# Patient Record
Sex: FEMALE | Race: WHITE | NOT HISPANIC OR LATINO | Employment: OTHER | URBAN - METROPOLITAN AREA
[De-identification: names, ages, dates, MRNs, and addresses within clinical notes are randomized per-mention and may not be internally consistent; named-entity substitution may affect disease eponyms.]

---

## 2018-01-24 ENCOUNTER — OFFICE VISIT (OUTPATIENT)
Dept: PAIN MEDICINE | Facility: CLINIC | Age: 63
End: 2018-01-24
Payer: COMMERCIAL

## 2018-01-24 VITALS
BODY MASS INDEX: 25.87 KG/M2 | SYSTOLIC BLOOD PRESSURE: 125 MMHG | DIASTOLIC BLOOD PRESSURE: 84 MMHG | HEART RATE: 86 BPM | HEIGHT: 63 IN | WEIGHT: 146 LBS

## 2018-01-24 DIAGNOSIS — M51.36 DDD (DEGENERATIVE DISC DISEASE), LUMBAR: Primary | ICD-10-CM

## 2018-01-24 DIAGNOSIS — M54.16 LUMBAR RADICULITIS: ICD-10-CM

## 2018-01-24 DIAGNOSIS — M48.061 SPINAL STENOSIS OF LUMBAR REGION WITHOUT NEUROGENIC CLAUDICATION: ICD-10-CM

## 2018-01-24 PROCEDURE — 99204 OFFICE O/P NEW MOD 45 MIN: CPT | Mod: 25,S$GLB,, | Performed by: ANESTHESIOLOGY

## 2018-01-24 PROCEDURE — 96372 THER/PROPH/DIAG INJ SC/IM: CPT | Mod: S$GLB,,, | Performed by: ANESTHESIOLOGY

## 2018-01-24 PROCEDURE — 99999 PR PBB SHADOW E&M-NEW PATIENT-LVL III: CPT | Mod: PBBFAC,,, | Performed by: ANESTHESIOLOGY

## 2018-01-24 RX ORDER — KETOROLAC TROMETHAMINE 30 MG/ML
30 INJECTION, SOLUTION INTRAMUSCULAR; INTRAVENOUS
Status: COMPLETED | OUTPATIENT
Start: 2018-01-24 | End: 2018-01-24

## 2018-01-24 RX ORDER — SODIUM, POTASSIUM,MAG SULFATES 17.5-3.13G
SOLUTION, RECONSTITUTED, ORAL ORAL
Status: ON HOLD | COMMUNITY
Start: 2017-11-02 | End: 2019-08-25 | Stop reason: CLARIF

## 2018-01-24 RX ORDER — GABAPENTIN 300 MG/1
300 CAPSULE ORAL 3 TIMES DAILY
Qty: 90 CAPSULE | Refills: 1 | Status: ON HOLD | OUTPATIENT
Start: 2018-01-24 | End: 2019-08-25 | Stop reason: CLARIF

## 2018-01-24 RX ORDER — LISINOPRIL 20 MG/1
20 TABLET ORAL NIGHTLY
Status: ON HOLD | COMMUNITY
Start: 2017-12-25 | End: 2019-08-25 | Stop reason: CLARIF

## 2018-01-24 RX ORDER — TRIAMTERENE AND HYDROCHLOROTHIAZIDE 37.5; 25 MG/1; MG/1
1 CAPSULE ORAL DAILY
COMMUNITY
Start: 2017-12-25

## 2018-01-24 RX ORDER — DIPHENHYDRAMINE HCL 25 MG
CAPSULE ORAL
COMMUNITY
End: 2019-09-18

## 2018-01-24 RX ADMIN — KETOROLAC TROMETHAMINE 30 MG: 30 INJECTION, SOLUTION INTRAMUSCULAR; INTRAVENOUS at 03:01

## 2018-01-25 ENCOUNTER — HOSPITAL ENCOUNTER (OUTPATIENT)
Dept: RADIOLOGY | Facility: HOSPITAL | Age: 63
Discharge: HOME | End: 2018-01-25
Attending: ANESTHESIOLOGY
Payer: COMMERCIAL

## 2018-01-25 ENCOUNTER — TELEPHONE (OUTPATIENT)
Dept: PAIN MEDICINE | Facility: CLINIC | Age: 63
End: 2018-01-25

## 2018-01-25 DIAGNOSIS — M54.16 LUMBAR RADICULOPATHY: Primary | ICD-10-CM

## 2018-01-25 DIAGNOSIS — M54.16 LUMBAR RADICULITIS: ICD-10-CM

## 2018-01-25 DIAGNOSIS — M51.36 DDD (DEGENERATIVE DISC DISEASE), LUMBAR: ICD-10-CM

## 2018-01-25 PROCEDURE — 72148 MRI LUMBAR SPINE W/O DYE: CPT | Mod: 26,,, | Performed by: RADIOLOGY

## 2018-01-25 PROCEDURE — 72148 MRI LUMBAR SPINE W/O DYE: CPT | Mod: TC

## 2018-01-25 NOTE — PROGRESS NOTES
This note was completed with dictation software and grammatical errors may exist.    Referring Physician: No ref. provider found    PCP: Jan Petersen MD    CC:  Low back and L leg pain    HPI:   Nancy Muñoz is a 62 y.o. female who has been living with low back pain for approximately 5 years since a fall. Was being seen by Dr. Alvarez in [place] for quite some time who has been treating her. She has previously undergone ~ 3 fusions in her neck. Recently she has noticed worsening of her back pain that is not alleviated by her normal measures, which include ibuprofen and heat backs. The pain has progressively worsened and began radiating down her L leg. She describes the pain as aching, burning, throbbing, grabbing, sharp, and shooting. Worsened by standing for long periods and walking.     ROS:  CONSTITUTIONAL: No fevers, chills, night sweats, wt. loss, appetite changes  SKIN: no rashes or itching  ENT: No headaches, head trauma, vision changes, or eye pain  LYMPH NODES: None noticed   CV: No chest pain, palpitations.   RESP: No shortness of breath, dyspnea on exertion, cough, wheezing, or hemoptysis  GI: No nausea, emesis, diarrhea, constipation, melena, hematochezia, pain.    : No dysuria, hematuria, urgency, or frequency   HEME: No easy bruising, bleeding problems  PSYCHIATRIC: No depression, anxiety, psychosis, hallucinations.  NEURO: No seizures, memory loss, dizziness, +ve difficulty sleeping  MSK: +ve joint pain      Past Medical History:   Diagnosis Date    Allergy     Arthritis     Asthma     Coronary artery disease     Hypertension     Hyperthyroidism      Past Surgical History:   Procedure Laterality Date    APPENDECTOMY      CHOLECYSTECTOMY      SPINE SURGERY      TONSILLECTOMY       No family history on file.  Social History     Social History    Marital status:      Spouse name: N/A    Number of children: N/A    Years of education: N/A     Social History Main Topics     "Smoking status: Current Every Day Smoker    Smokeless tobacco: Current User    Alcohol use 0.6 oz/week     1 Glasses of wine per week    Drug use: No    Sexual activity: Yes     Other Topics Concern    None     Social History Narrative    None         Medications/Allergies: See med card    Vitals:    01/24/18 1449   BP: 125/84   Pulse: 86   Weight: 66.2 kg (146 lb)   Height: 5' 3" (1.6 m)   PainSc: 10-Worst pain ever   PainLoc: Back         Physical exam:    GENERAL: A and O x3, the patient appears well groomed and is in no acute distress.  Skin: No rashes or obvious lesions  HEENT: normocephalic, atraumatic  CARDIOVASCULAR:  Palpable peripheral pulses  LUNGS: easy work of breathing  ABDOMEN: soft, nontender   UPPER EXTREMITIES: Normal alignment, normal range of motion, no atrophy, no skin changes,  hair growth and nail growth normal and equal bilaterally. No swelling, no tenderness.    LOWER EXTREMITIES:  Normal alignment, normal range of motion, no atrophy, no skin changes,  hair growth and nail growth normal and equal bilaterally. No swelling, no tenderness.    LUMBAR SPINE  Lumbar spine: ROM is full with flexion with no increased pain. Extension and oblique causes significant pain  Cedrick's test causes no increased pain on either side.    Supine straight leg raise is positive on L  Femoral stretch is positive on L  Internal and external rotation of the hip causes no increased pain on either side.  Myofascial exam: No tenderness to palpation across lumbar paraspinous muscles.    MENTAL STATUS: normal orientation, speech, language, and fund of knowledge for social situation.  Emotional state appropriate.    CRANIAL NERVES:  II:  PERRL bilaterally,   III,IV,VI: EOMI.    V:  Facial sensation equal bilaterally  VII:  Facial motor function normal.  VIII:  Hearing equal to finger rub bilaterally  IX/X: Gag normal, palate symmetric  XI:  Shoulder shrug equal, head turn equal  XII:  Tongue midline without " fasciculations      MOTOR: Tone and bulk: normal bilateral upper and lower Strength: normal   Delt Bi Tri WE WF     R 5 5 5 5 5 5   L 5 5 5 5 5 5     IP ADD ABD Quad TA Gas HAM  R 5 5 5 5 5 5 5  L 5 5 5 5 5 5 5    SENSATION: Light touch and pinprick intact bilaterally  REFLEXES: normal, symmetric, nonbrisk.  Toes down, no clonus. No hoffmans.  GAIT: antalgic, flexion during majority of gait      Imaging:  Pending MRI    Assessment:  61 yo female with an exacerbation of chronic low back pain  1. DDD (degenerative disc disease), lumbar    2. Lumbar radiculitis    3. Spinal stenosis of lumbar region without neurogenic claudication          Plan:  - I have stressed the importance of physical activity and exercise to improve overall health  - Schedule lumbar MRI to further evaluate worsening radicular pain  - Will contact patient with MRI results  - IM Toradol today  - Start gabapentin 300mg TID for anti-neuropathic adjunct

## 2018-01-29 RX ORDER — NAPROXEN SODIUM 220 MG
220 TABLET ORAL
COMMUNITY
End: 2018-02-19

## 2018-01-29 RX ORDER — IBUPROFEN 200 MG
200 TABLET ORAL EVERY 6 HOURS PRN
COMMUNITY
End: 2018-02-19

## 2018-01-30 ENCOUNTER — HOSPITAL ENCOUNTER (OUTPATIENT)
Facility: AMBULARY SURGERY CENTER | Age: 63
Discharge: HOME | End: 2018-01-30
Attending: ANESTHESIOLOGY | Admitting: ANESTHESIOLOGY
Payer: COMMERCIAL

## 2018-01-30 ENCOUNTER — SURGERY (OUTPATIENT)
Age: 63
End: 2018-01-30

## 2018-01-30 DIAGNOSIS — M54.16 LUMBAR RADICULITIS: Primary | ICD-10-CM

## 2018-01-30 PROCEDURE — 64484 NJX AA&/STRD TFRM EPI L/S EA: CPT | Mod: LT,,, | Performed by: ANESTHESIOLOGY

## 2018-01-30 PROCEDURE — 64483 NJX AA&/STRD TFRM EPI L/S 1: CPT | Mod: LT,,, | Performed by: ANESTHESIOLOGY

## 2018-01-30 PROCEDURE — 99152 MOD SED SAME PHYS/QHP 5/>YRS: CPT | Mod: ,,, | Performed by: ANESTHESIOLOGY

## 2018-01-30 PROCEDURE — 64484 NJX AA&/STRD TFRM EPI L/S EA: CPT | Performed by: ANESTHESIOLOGY

## 2018-01-30 PROCEDURE — 64483 NJX AA&/STRD TFRM EPI L/S 1: CPT | Performed by: ANESTHESIOLOGY

## 2018-01-30 RX ORDER — DEXAMETHASONE SODIUM PHOSPHATE 10 MG/ML
INJECTION INTRAMUSCULAR; INTRAVENOUS
Status: DISCONTINUED
Start: 2018-01-30 | End: 2018-01-30 | Stop reason: HOSPADM

## 2018-01-30 RX ORDER — LIDOCAINE HYDROCHLORIDE 10 MG/ML
INJECTION, SOLUTION EPIDURAL; INFILTRATION; INTRACAUDAL; PERINEURAL
Status: DISCONTINUED
Start: 2018-01-30 | End: 2018-01-30 | Stop reason: HOSPADM

## 2018-01-30 RX ORDER — ALBUTEROL SULFATE 90 UG/1
2 AEROSOL, METERED RESPIRATORY (INHALATION) EVERY 6 HOURS PRN
COMMUNITY

## 2018-01-30 RX ORDER — MIDAZOLAM HYDROCHLORIDE 1 MG/ML
INJECTION INTRAMUSCULAR; INTRAVENOUS
Status: DISCONTINUED
Start: 2018-01-30 | End: 2018-01-30 | Stop reason: HOSPADM

## 2018-01-30 RX ORDER — DEXAMETHASONE SODIUM PHOSPHATE 10 MG/ML
INJECTION INTRAMUSCULAR; INTRAVENOUS
Status: DISCONTINUED | OUTPATIENT
Start: 2018-01-30 | End: 2018-01-30 | Stop reason: HOSPADM

## 2018-01-30 RX ORDER — FENTANYL CITRATE 50 UG/ML
INJECTION, SOLUTION INTRAMUSCULAR; INTRAVENOUS
Status: DISCONTINUED | OUTPATIENT
Start: 2018-01-30 | End: 2018-01-30 | Stop reason: HOSPADM

## 2018-01-30 RX ORDER — FENTANYL CITRATE 50 UG/ML
INJECTION, SOLUTION INTRAMUSCULAR; INTRAVENOUS
Status: DISCONTINUED
Start: 2018-01-30 | End: 2018-01-30 | Stop reason: HOSPADM

## 2018-01-30 RX ORDER — BUPIVACAINE HYDROCHLORIDE 2.5 MG/ML
INJECTION, SOLUTION EPIDURAL; INFILTRATION; INTRACAUDAL
Status: DISCONTINUED | OUTPATIENT
Start: 2018-01-30 | End: 2018-01-30 | Stop reason: HOSPADM

## 2018-01-30 RX ORDER — SODIUM CHLORIDE, SODIUM LACTATE, POTASSIUM CHLORIDE, CALCIUM CHLORIDE 600; 310; 30; 20 MG/100ML; MG/100ML; MG/100ML; MG/100ML
INJECTION, SOLUTION INTRAVENOUS ONCE AS NEEDED
Status: COMPLETED | OUTPATIENT
Start: 2018-01-30 | End: 2018-01-30

## 2018-01-30 RX ORDER — MIDAZOLAM HYDROCHLORIDE 1 MG/ML
INJECTION INTRAMUSCULAR; INTRAVENOUS
Status: DISCONTINUED | OUTPATIENT
Start: 2018-01-30 | End: 2018-01-30 | Stop reason: HOSPADM

## 2018-01-30 RX ORDER — LIDOCAINE HYDROCHLORIDE 10 MG/ML
INJECTION, SOLUTION EPIDURAL; INFILTRATION; INTRACAUDAL; PERINEURAL
Status: DISCONTINUED | OUTPATIENT
Start: 2018-01-30 | End: 2018-01-30 | Stop reason: HOSPADM

## 2018-01-30 RX ADMIN — LIDOCAINE HYDROCHLORIDE 10 ML: 10 INJECTION, SOLUTION EPIDURAL; INFILTRATION; INTRACAUDAL; PERINEURAL at 02:01

## 2018-01-30 RX ADMIN — BUPIVACAINE HYDROCHLORIDE 3 ML: 2.5 INJECTION, SOLUTION EPIDURAL; INFILTRATION; INTRACAUDAL at 02:01

## 2018-01-30 RX ADMIN — FENTANYL CITRATE 50 MCG: 50 INJECTION, SOLUTION INTRAMUSCULAR; INTRAVENOUS at 02:01

## 2018-01-30 RX ADMIN — MIDAZOLAM HYDROCHLORIDE 2 MG: 1 INJECTION INTRAMUSCULAR; INTRAVENOUS at 02:01

## 2018-01-30 RX ADMIN — SODIUM CHLORIDE, SODIUM LACTATE, POTASSIUM CHLORIDE, CALCIUM CHLORIDE: 600; 310; 30; 20 INJECTION, SOLUTION INTRAVENOUS at 01:01

## 2018-01-30 RX ADMIN — DEXAMETHASONE SODIUM PHOSPHATE 10 MG: 10 INJECTION INTRAMUSCULAR; INTRAVENOUS at 02:01

## 2018-01-30 NOTE — DISCHARGE SUMMARY
[facility]  Discharge Note  Short Stay    Admit Date: 1/30/2018    Discharge Date and Time: 1/30/2018    Attending Physician: Oziel Bustos MD     Discharge Provider: Oziel Bustos    Diagnoses:  Active Hospital Problems    Diagnosis  POA    *Lumbar radiculitis [M54.16]  Yes      Resolved Hospital Problems    Diagnosis Date Resolved POA   No resolved problems to display.       Hospital Course: Lumbar OLGA  Discharged Condition: Good    Final Diagnoses:   Active Hospital Problems    Diagnosis  POA    *Lumbar radiculitis [M54.16]  Yes      Resolved Hospital Problems    Diagnosis Date Resolved POA   No resolved problems to display.       Disposition: Home or Self Care    Follow up/Patient Instructions:    Medications:  Reconciled Home Medications:   Current Discharge Medication List      CONTINUE these medications which have NOT CHANGED    Details   albuterol (PROAIR HFA) 90 mcg/actuation inhaler Inhale 2 puffs into the lungs every 6 (six) hours as needed for Wheezing. Rescue      ibuprofen (ADVIL,MOTRIN) 200 MG tablet Take 200 mg by mouth every 6 (six) hours as needed for Pain.      naproxen sodium (ANAPROX) 220 MG tablet Take 220 mg by mouth every 12 (twelve) hours.      triamterene-hydrochlorothiazide 37.5-25 mg (DYAZIDE) 37.5-25 mg per capsule       diphenhydrAMINE (BENADRYL) 25 mg capsule Take by mouth.      gabapentin (NEURONTIN) 300 MG capsule Take 1 capsule (300 mg total) by mouth 3 (three) times daily.  Qty: 90 capsule, Refills: 1    Associated Diagnoses: DDD (degenerative disc disease), lumbar; Lumbar radiculitis      lisinopril (PRINIVIL,ZESTRIL) 20 MG tablet       SUPREP BOWEL PREP KIT 17.5-3.13-1.6 gram SolR              Discharge Procedure Orders  Call MD for:  temperature >100.4     Call MD for:  persistent nausea and vomiting or diarrhea     Call MD for:  severe uncontrolled pain     Call MD for:  redness, tenderness, or signs of infection (pain, swelling, redness, odor or green/yellow discharge  around incision site)     Call MD for:  difficulty breathing or increased cough     Call MD for:  severe persistent headache          Follow up with MD in 2-3 weeks    Discharge Procedure Orders (must include Diet, Follow-up, Activity):    Discharge Procedure Orders (must include Diet, Follow-up, Activity)  Call MD for:  temperature >100.4     Call MD for:  persistent nausea and vomiting or diarrhea     Call MD for:  severe uncontrolled pain     Call MD for:  redness, tenderness, or signs of infection (pain, swelling, redness, odor or green/yellow discharge around incision site)     Call MD for:  difficulty breathing or increased cough     Call MD for:  severe persistent headache

## 2018-01-30 NOTE — H&P (VIEW-ONLY)
This note was completed with dictation software and grammatical errors may exist.    Referring Physician: No ref. provider found    PCP: Jan Petersen MD    CC:  Low back and L leg pain    HPI:   Nnacy Muñoz is a 62 y.o. female who has been living with low back pain for approximately 5 years since a fall. Was being seen by Dr. Alvarez in [place] for quite some time who has been treating her. She has previously undergone ~ 3 fusions in her neck. Recently she has noticed worsening of her back pain that is not alleviated by her normal measures, which include ibuprofen and heat backs. The pain has progressively worsened and began radiating down her L leg. She describes the pain as aching, burning, throbbing, grabbing, sharp, and shooting. Worsened by standing for long periods and walking.     ROS:  CONSTITUTIONAL: No fevers, chills, night sweats, wt. loss, appetite changes  SKIN: no rashes or itching  ENT: No headaches, head trauma, vision changes, or eye pain  LYMPH NODES: None noticed   CV: No chest pain, palpitations.   RESP: No shortness of breath, dyspnea on exertion, cough, wheezing, or hemoptysis  GI: No nausea, emesis, diarrhea, constipation, melena, hematochezia, pain.    : No dysuria, hematuria, urgency, or frequency   HEME: No easy bruising, bleeding problems  PSYCHIATRIC: No depression, anxiety, psychosis, hallucinations.  NEURO: No seizures, memory loss, dizziness, +ve difficulty sleeping  MSK: +ve joint pain      Past Medical History:   Diagnosis Date    Allergy     Arthritis     Asthma     Coronary artery disease     Hypertension     Hyperthyroidism      Past Surgical History:   Procedure Laterality Date    APPENDECTOMY      CHOLECYSTECTOMY      SPINE SURGERY      TONSILLECTOMY       No family history on file.  Social History     Social History    Marital status:      Spouse name: N/A    Number of children: N/A    Years of education: N/A     Social History Main Topics     "Smoking status: Current Every Day Smoker    Smokeless tobacco: Current User    Alcohol use 0.6 oz/week     1 Glasses of wine per week    Drug use: No    Sexual activity: Yes     Other Topics Concern    None     Social History Narrative    None         Medications/Allergies: See med card    Vitals:    01/24/18 1449   BP: 125/84   Pulse: 86   Weight: 66.2 kg (146 lb)   Height: 5' 3" (1.6 m)   PainSc: 10-Worst pain ever   PainLoc: Back         Physical exam:    GENERAL: A and O x3, the patient appears well groomed and is in no acute distress.  Skin: No rashes or obvious lesions  HEENT: normocephalic, atraumatic  CARDIOVASCULAR:  Palpable peripheral pulses  LUNGS: easy work of breathing  ABDOMEN: soft, nontender   UPPER EXTREMITIES: Normal alignment, normal range of motion, no atrophy, no skin changes,  hair growth and nail growth normal and equal bilaterally. No swelling, no tenderness.    LOWER EXTREMITIES:  Normal alignment, normal range of motion, no atrophy, no skin changes,  hair growth and nail growth normal and equal bilaterally. No swelling, no tenderness.    LUMBAR SPINE  Lumbar spine: ROM is full with flexion with no increased pain. Extension and oblique causes significant pain  Cedrick's test causes no increased pain on either side.    Supine straight leg raise is positive on L  Femoral stretch is positive on L  Internal and external rotation of the hip causes no increased pain on either side.  Myofascial exam: No tenderness to palpation across lumbar paraspinous muscles.    MENTAL STATUS: normal orientation, speech, language, and fund of knowledge for social situation.  Emotional state appropriate.    CRANIAL NERVES:  II:  PERRL bilaterally,   III,IV,VI: EOMI.    V:  Facial sensation equal bilaterally  VII:  Facial motor function normal.  VIII:  Hearing equal to finger rub bilaterally  IX/X: Gag normal, palate symmetric  XI:  Shoulder shrug equal, head turn equal  XII:  Tongue midline without " fasciculations      MOTOR: Tone and bulk: normal bilateral upper and lower Strength: normal   Delt Bi Tri WE WF     R 5 5 5 5 5 5   L 5 5 5 5 5 5     IP ADD ABD Quad TA Gas HAM  R 5 5 5 5 5 5 5  L 5 5 5 5 5 5 5    SENSATION: Light touch and pinprick intact bilaterally  REFLEXES: normal, symmetric, nonbrisk.  Toes down, no clonus. No hoffmans.  GAIT: antalgic, flexion during majority of gait      Imaging:  Pending MRI    Assessment:  63 yo female with an exacerbation of chronic low back pain  1. DDD (degenerative disc disease), lumbar    2. Lumbar radiculitis    3. Spinal stenosis of lumbar region without neurogenic claudication          Plan:  - I have stressed the importance of physical activity and exercise to improve overall health  - Schedule lumbar MRI to further evaluate worsening radicular pain  - Will contact patient with MRI results  - IM Toradol today  - Start gabapentin 300mg TID for anti-neuropathic adjunct

## 2018-01-30 NOTE — OP NOTE
PROCEDURE DATE: 1/30/2018    PROCEDURE: Left L4-5 and L5-S1 transforaminal epidural steroid injection under fluoroscopy    DIAGNOSIS: Lumbar disc displacement without myelopathy  Post op diagnosis: Same    PHYSICIAN: Oziel Bustos MD    MEDICATIONS INJECTED:  Dexamethasone 5mg (0.5ml) and 1.5ml 0.25% bupivicaine at each nerve root.     LOCAL ANESTHETIC INJECTED:  Lidocaine 1%. 2 ml per site.    SEDATION MEDICATIONS: RN IV sedation    ESTIMATED BLOOD LOSS:  None    COMPLICATIONS:  None    TECHNIQUE:   A time-out was taken to identify patient and procedure side prior to starting the procedure. The patient was placed in a prone position, prepped and draped in the usual sterile fashion using ChloraPrep and sterile towels.  The area to be injected was determined under fluoroscopic guidance in AP and oblique view.  Local anesthetic was given by raising a wheal and going down to the hub of a 25-gauge 1.5 inch needle.  In oblique view, a 3.5 inch 22-gauge bent-tip spinal needle was introduced towards 6 oclock position of the pedicle of each above named nerve root level.  The needle was walked medially then hinged into the neural foramen and position was confirmed in AP and lateral views.  1ml contrast dye was injected to confirm appropriate placement and that there was no vascular uptake.  After negative aspiration for blood or CSF, the medication was then injected. This was performed at the left L4-5 and L5-S1 level(s). The patient tolerated the procedure well.    The patient was monitored after the procedure.  Patient was given post procedure and discharge instructions to follow at home. The patient was discharged in a stable condition.

## 2018-01-30 NOTE — INTERVAL H&P NOTE
The patient has been examined and the H&P has been reviewed:    I concur with the findings and no changes have occurred since H&P was written.  This patient has been cleared for surgery in an ambulatory surgical facility    ASA 3,  MP3  No history of anesthetic complications  Plan for RN IV sedation      Anesthesia/Surgery risks, benefits and alternative options discussed and understood by patient/family.          There are no hospital problems to display for this patient.

## 2018-01-30 NOTE — DISCHARGE INSTRUCTIONS
Pain injection instructions:     Steroids take about a week to relieve pain.  Initially you may get pain relief from the local anesthetic but this may wear off before the steroid works.    No driving for 24 hrs   Activity as tolerated- gradually increase activities.  Dont lift over 10 lbs for 24 hrs   No heat at injection sites x 2 days  Use ice for mild swelling and for comfort.  May shower today. No baths for two days.      Resume Aspirin, Plavix, or Coumadin the day after the procedure unless otherwise instructed.   If diabetic,monitor your glucose carefully as steroids can increase glucose level    Seek immediate medical help for:   Severe increase in your usual pain or appearance of new pain.  Prolonged or increasing weakness or numbness in the legs or arms.    - Numbing medicine was injected that affects nerves that carry information from       muscles to brain and vice versa.  This numbness can last 4-6 hrs so be very careful walking.    Fever above 101 ,Drainage,redness,active bleeding, or increased swelling at the injection site.  Headache, shortness of breath, chest pain, or breathing problems.       Anesthesia information    Anesthesia Safety      You have been given medicine  to sedate you during your procedure today. This may have included both a pain medicine and sleeping medicine. Most of the effects have worn off; however, you may continue to have some drowsiness for the next  24 hours. Anesthesia and pain medicines can cause nausea, sleepiness, dizziness and  constipation.    HOME CARE:  1) For the next EIGHT HOURS, you should be watched by a responsible adult to look for any worsening of your condition.  2) DO NOT DRINK any ALCOHOL for the next 24 HOURS.  3) DO NOT DRIVE or operate dangerous machinery during the next 24 HOURS.  FOLLOW UP with your doctor or this facility if you are not alert and back to your usual level of activity within 24 hrs.  GET PROMPT MEDICAL ATTENTION if any of the  following occur:  -- Increased drowsiness  -- Increased weakness or dizziness  -- Repeated vomiting  -- If you cannot be awakened

## 2018-01-31 VITALS
WEIGHT: 146 LBS | OXYGEN SATURATION: 96 % | HEART RATE: 85 BPM | HEIGHT: 63 IN | BODY MASS INDEX: 25.87 KG/M2 | SYSTOLIC BLOOD PRESSURE: 130 MMHG | DIASTOLIC BLOOD PRESSURE: 83 MMHG | RESPIRATION RATE: 17 BRPM | TEMPERATURE: 98 F

## 2018-02-05 ENCOUNTER — TELEPHONE (OUTPATIENT)
Dept: PAIN MEDICINE | Facility: CLINIC | Age: 63
End: 2018-02-05

## 2018-02-05 RX ORDER — HYDROCODONE BITARTRATE AND ACETAMINOPHEN 7.5; 325 MG/1; MG/1
1 TABLET ORAL EVERY 8 HOURS PRN
Qty: 45 TABLET | Refills: 0 | Status: ON HOLD | OUTPATIENT
Start: 2018-02-05 | End: 2018-02-23

## 2018-02-05 NOTE — TELEPHONE ENCOUNTER
Pt called in c/o lower back pain. Rates it a 10/10. She had a lumbar OLGA on 1/30/18. She had minimal relief from procedure after one day. She is asking for other options. She is currently taking Aleve for pain relief.

## 2018-02-23 ENCOUNTER — HOSPITAL ENCOUNTER (INPATIENT)
Facility: HOSPITAL | Age: 63
LOS: 2 days | Discharge: HOME | DRG: 357 | End: 2018-02-28
Attending: INTERNAL MEDICINE | Admitting: INTERNAL MEDICINE
Payer: COMMERCIAL

## 2018-02-23 DIAGNOSIS — I10 HYPERTENSION, UNSPECIFIED TYPE: ICD-10-CM

## 2018-02-23 DIAGNOSIS — E83.52 HYPERCALCEMIA: ICD-10-CM

## 2018-02-23 DIAGNOSIS — K81.9 CHOLECYSTITIS: ICD-10-CM

## 2018-02-23 DIAGNOSIS — M54.16 LUMBAR RADICULITIS: ICD-10-CM

## 2018-02-23 DIAGNOSIS — K83.8 DILATED CBD, ACQUIRED: Primary | ICD-10-CM

## 2018-02-23 DIAGNOSIS — R10.30 LOWER ABDOMINAL PAIN: ICD-10-CM

## 2018-02-23 DIAGNOSIS — K57.92 DIVERTICULITIS: ICD-10-CM

## 2018-02-23 DIAGNOSIS — R93.5 ABNORMAL ABDOMINAL CT SCAN: ICD-10-CM

## 2018-02-23 LAB
AMYLASE SERPL-CCNC: 54 U/L
ANION GAP SERPL CALC-SCNC: 8 MMOL/L
APTT BLDCRRT: 23.8 SEC
BACTERIA #/AREA URNS HPF: ABNORMAL /HPF
BASOPHILS # BLD AUTO: 0 K/UL
BASOPHILS NFR BLD: 0 %
BILIRUB UR QL STRIP: NEGATIVE
BUN SERPL-MCNC: 49 MG/DL
CALCIUM SERPL-MCNC: 11.4 MG/DL
CHLORIDE SERPL-SCNC: 102 MMOL/L
CLARITY UR: CLEAR
CO2 SERPL-SCNC: 27 MMOL/L
COLOR UR: YELLOW
CREAT SERPL-MCNC: 1.2 MG/DL
DIFFERENTIAL METHOD: ABNORMAL
EOSINOPHIL # BLD AUTO: 0 K/UL
EOSINOPHIL NFR BLD: 0.2 %
ERYTHROCYTE [DISTWIDTH] IN BLOOD BY AUTOMATED COUNT: 12.7 %
EST. GFR  (AFRICAN AMERICAN): 56 ML/MIN/1.73 M^2
EST. GFR  (NON AFRICAN AMERICAN): 48 ML/MIN/1.73 M^2
ESTIMATED AVG GLUCOSE: 103 MG/DL
GLUCOSE SERPL-MCNC: 107 MG/DL
GLUCOSE UR QL STRIP: NEGATIVE
HBA1C MFR BLD HPLC: 5.2 %
HCT VFR BLD AUTO: 39.9 %
HGB BLD-MCNC: 13.4 G/DL
HGB UR QL STRIP: NEGATIVE
INR PPP: 1
KETONES UR QL STRIP: NEGATIVE
LEUKOCYTE ESTERASE UR QL STRIP: ABNORMAL
LIPASE SERPL-CCNC: 38 U/L
LYMPHOCYTES # BLD AUTO: 1.3 K/UL
LYMPHOCYTES NFR BLD: 12.1 %
MCH RBC QN AUTO: 31.3 PG
MCHC RBC AUTO-ENTMCNC: 33.5 G/DL
MCV RBC AUTO: 94 FL
MICROSCOPIC COMMENT: ABNORMAL
MONOCYTES # BLD AUTO: 1.1 K/UL
MONOCYTES NFR BLD: 9.9 %
NEUTROPHILS # BLD AUTO: 8.7 K/UL
NEUTROPHILS NFR BLD: 77.8 %
NITRITE UR QL STRIP: NEGATIVE
PH UR STRIP: 7 [PH] (ref 5–8)
PHOSPHATE SERPL-MCNC: 2.5 MG/DL
PLATELET # BLD AUTO: 257 K/UL
PMV BLD AUTO: 7.8 FL
POTASSIUM SERPL-SCNC: 3.9 MMOL/L
PROT UR QL STRIP: NEGATIVE
PROTHROMBIN TIME: 10.5 SEC
PTH-INTACT SERPL-MCNC: 193.6 PG/ML
RBC # BLD AUTO: 4.26 M/UL
RBC #/AREA URNS HPF: 2 /HPF (ref 0–4)
SODIUM SERPL-SCNC: 137 MMOL/L
SP GR UR STRIP: 1.01 (ref 1–1.03)
SQUAMOUS #/AREA URNS HPF: 4 /HPF
URATE CRY URNS QL MICRO: ABNORMAL
URN SPEC COLLECT METH UR: ABNORMAL
UROBILINOGEN UR STRIP-ACNC: NEGATIVE EU/DL
WBC # BLD AUTO: 11.2 K/UL
WBC #/AREA URNS HPF: 38 /HPF (ref 0–5)

## 2018-02-23 PROCEDURE — 94760 N-INVAS EAR/PLS OXIMETRY 1: CPT

## 2018-02-23 PROCEDURE — S0030 INJECTION, METRONIDAZOLE: HCPCS | Performed by: INTERNAL MEDICINE

## 2018-02-23 PROCEDURE — 63600175 PHARM REV CODE 636 W HCPCS: Performed by: INTERNAL MEDICINE

## 2018-02-23 PROCEDURE — 99219 PR INITIAL OBSERVATION CARE,LEVL II: CPT | Mod: ,,, | Performed by: INTERNAL MEDICINE

## 2018-02-23 PROCEDURE — G0379 DIRECT REFER HOSPITAL OBSERV: HCPCS

## 2018-02-23 PROCEDURE — 85025 COMPLETE CBC W/AUTO DIFF WBC: CPT

## 2018-02-23 PROCEDURE — 87427 SHIGA-LIKE TOXIN AG IA: CPT | Mod: 59

## 2018-02-23 PROCEDURE — 82652 VIT D 1 25-DIHYDROXY: CPT

## 2018-02-23 PROCEDURE — 85730 THROMBOPLASTIN TIME PARTIAL: CPT

## 2018-02-23 PROCEDURE — 87046 STOOL CULTR AEROBIC BACT EA: CPT | Mod: 59

## 2018-02-23 PROCEDURE — 25000003 PHARM REV CODE 250: Performed by: INTERNAL MEDICINE

## 2018-02-23 PROCEDURE — 83970 ASSAY OF PARATHORMONE: CPT

## 2018-02-23 PROCEDURE — 99900035 HC TECH TIME PER 15 MIN (STAT)

## 2018-02-23 PROCEDURE — 83690 ASSAY OF LIPASE: CPT

## 2018-02-23 PROCEDURE — 87086 URINE CULTURE/COLONY COUNT: CPT

## 2018-02-23 PROCEDURE — 82150 ASSAY OF AMYLASE: CPT

## 2018-02-23 PROCEDURE — 87045 FECES CULTURE AEROBIC BACT: CPT

## 2018-02-23 PROCEDURE — 93005 ELECTROCARDIOGRAM TRACING: CPT

## 2018-02-23 PROCEDURE — 25000003 PHARM REV CODE 250: Performed by: NURSE PRACTITIONER

## 2018-02-23 PROCEDURE — 80048 BASIC METABOLIC PNL TOTAL CA: CPT

## 2018-02-23 PROCEDURE — 25500020 PHARM REV CODE 255

## 2018-02-23 PROCEDURE — 87449 NOS EACH ORGANISM AG IA: CPT

## 2018-02-23 PROCEDURE — 36415 COLL VENOUS BLD VENIPUNCTURE: CPT

## 2018-02-23 PROCEDURE — 83036 HEMOGLOBIN GLYCOSYLATED A1C: CPT

## 2018-02-23 PROCEDURE — 85610 PROTHROMBIN TIME: CPT

## 2018-02-23 PROCEDURE — 84100 ASSAY OF PHOSPHORUS: CPT

## 2018-02-23 PROCEDURE — 81000 URINALYSIS NONAUTO W/SCOPE: CPT

## 2018-02-23 PROCEDURE — G0378 HOSPITAL OBSERVATION PER HR: HCPCS

## 2018-02-23 RX ORDER — ACETAMINOPHEN 325 MG/1
650 TABLET ORAL EVERY 4 HOURS PRN
Status: DISCONTINUED | OUTPATIENT
Start: 2018-02-23 | End: 2018-02-28 | Stop reason: HOSPADM

## 2018-02-23 RX ORDER — ALBUTEROL SULFATE 2.5 MG/.5ML
2.5 SOLUTION RESPIRATORY (INHALATION) EVERY 6 HOURS PRN
Status: DISCONTINUED | OUTPATIENT
Start: 2018-02-23 | End: 2018-02-28 | Stop reason: HOSPADM

## 2018-02-23 RX ORDER — ACETAMINOPHEN 325 MG/1
650 TABLET ORAL EVERY 6 HOURS PRN
Status: DISCONTINUED | OUTPATIENT
Start: 2018-02-23 | End: 2018-02-28 | Stop reason: HOSPADM

## 2018-02-23 RX ORDER — IBUPROFEN 200 MG
16 TABLET ORAL
Status: DISCONTINUED | OUTPATIENT
Start: 2018-02-23 | End: 2018-02-28 | Stop reason: HOSPADM

## 2018-02-23 RX ORDER — OXYCODONE AND ACETAMINOPHEN 10; 325 MG/1; MG/1
1 TABLET ORAL EVERY 8 HOURS PRN
COMMUNITY
End: 2019-09-18

## 2018-02-23 RX ORDER — GLUCAGON 1 MG
1 KIT INJECTION
Status: DISCONTINUED | OUTPATIENT
Start: 2018-02-23 | End: 2018-02-28 | Stop reason: HOSPADM

## 2018-02-23 RX ORDER — METHYLPREDNISOLONE 4 MG/1
4 TABLET ORAL
Status: ON HOLD | COMMUNITY
End: 2019-08-25 | Stop reason: CLARIF

## 2018-02-23 RX ORDER — LISINOPRIL 10 MG/1
20 TABLET ORAL NIGHTLY
Status: DISCONTINUED | OUTPATIENT
Start: 2018-02-23 | End: 2018-02-24

## 2018-02-23 RX ORDER — RAMELTEON 8 MG/1
8 TABLET ORAL NIGHTLY PRN
Status: DISCONTINUED | OUTPATIENT
Start: 2018-02-23 | End: 2018-02-28 | Stop reason: HOSPADM

## 2018-02-23 RX ORDER — ALBUTEROL SULFATE 90 UG/1
2 AEROSOL, METERED RESPIRATORY (INHALATION) EVERY 6 HOURS PRN
Status: DISCONTINUED | OUTPATIENT
Start: 2018-02-23 | End: 2018-02-23

## 2018-02-23 RX ORDER — SODIUM CHLORIDE 9 MG/ML
INJECTION, SOLUTION INTRAVENOUS
Status: DISPENSED
Start: 2018-02-23 | End: 2018-02-24

## 2018-02-23 RX ORDER — IBUPROFEN 200 MG
24 TABLET ORAL
Status: DISCONTINUED | OUTPATIENT
Start: 2018-02-23 | End: 2018-02-28 | Stop reason: HOSPADM

## 2018-02-23 RX ORDER — OXYCODONE AND ACETAMINOPHEN 10; 325 MG/1; MG/1
1 TABLET ORAL EVERY 8 HOURS PRN
Status: DISCONTINUED | OUTPATIENT
Start: 2018-02-23 | End: 2018-02-24

## 2018-02-23 RX ORDER — METRONIDAZOLE 500 MG/100ML
500 INJECTION, SOLUTION INTRAVENOUS
Status: DISCONTINUED | OUTPATIENT
Start: 2018-02-23 | End: 2018-02-28 | Stop reason: HOSPADM

## 2018-02-23 RX ORDER — ONDANSETRON 2 MG/ML
4 INJECTION INTRAMUSCULAR; INTRAVENOUS EVERY 6 HOURS PRN
Status: DISCONTINUED | OUTPATIENT
Start: 2018-02-23 | End: 2018-02-28 | Stop reason: HOSPADM

## 2018-02-23 RX ORDER — GABAPENTIN 300 MG/1
300 CAPSULE ORAL 3 TIMES DAILY
Status: DISCONTINUED | OUTPATIENT
Start: 2018-02-23 | End: 2018-02-28 | Stop reason: HOSPADM

## 2018-02-23 RX ORDER — ENOXAPARIN SODIUM 100 MG/ML
40 INJECTION SUBCUTANEOUS EVERY 24 HOURS
Status: DISCONTINUED | OUTPATIENT
Start: 2018-02-23 | End: 2018-02-25

## 2018-02-23 RX ORDER — PANTOPRAZOLE SODIUM 40 MG/1
40 TABLET, DELAYED RELEASE ORAL DAILY
Status: DISCONTINUED | OUTPATIENT
Start: 2018-02-23 | End: 2018-02-28 | Stop reason: HOSPADM

## 2018-02-23 RX ORDER — SODIUM CHLORIDE 0.9 % (FLUSH) 0.9 %
5 SYRINGE (ML) INJECTION
Status: DISCONTINUED | OUTPATIENT
Start: 2018-02-23 | End: 2018-02-28 | Stop reason: HOSPADM

## 2018-02-23 RX ORDER — SODIUM CHLORIDE 9 MG/ML
INJECTION, SOLUTION INTRAVENOUS CONTINUOUS
Status: DISCONTINUED | OUTPATIENT
Start: 2018-02-23 | End: 2018-02-28

## 2018-02-23 RX ADMIN — ENOXAPARIN SODIUM 40 MG: 100 INJECTION SUBCUTANEOUS at 04:02

## 2018-02-23 RX ADMIN — IOHEXOL 75 ML: 350 INJECTION, SOLUTION INTRAVENOUS at 03:02

## 2018-02-23 RX ADMIN — LISINOPRIL 20 MG: 10 TABLET ORAL at 08:02

## 2018-02-23 RX ADMIN — OXYCODONE HYDROCHLORIDE AND ACETAMINOPHEN 1 TABLET: 10; 325 TABLET ORAL at 02:02

## 2018-02-23 RX ADMIN — METRONIDAZOLE 500 MG: 500 INJECTION, SOLUTION INTRAVENOUS at 10:02

## 2018-02-23 RX ADMIN — PIPERACILLIN AND TAZOBACTAM 4.5 G: 4; .5 INJECTION, POWDER, LYOPHILIZED, FOR SOLUTION INTRAVENOUS; PARENTERAL at 03:02

## 2018-02-23 RX ADMIN — OXYCODONE HYDROCHLORIDE AND ACETAMINOPHEN 1 TABLET: 10; 325 TABLET ORAL at 10:02

## 2018-02-23 RX ADMIN — PIPERACILLIN AND TAZOBACTAM 4.5 G: 4; .5 INJECTION, POWDER, LYOPHILIZED, FOR SOLUTION INTRAVENOUS; PARENTERAL at 09:02

## 2018-02-23 RX ADMIN — METRONIDAZOLE 500 MG: 500 INJECTION, SOLUTION INTRAVENOUS at 03:02

## 2018-02-23 RX ADMIN — SODIUM CHLORIDE: 0.9 INJECTION, SOLUTION INTRAVENOUS at 03:02

## 2018-02-23 RX ADMIN — GABAPENTIN 300 MG: 300 CAPSULE ORAL at 08:02

## 2018-02-23 RX ADMIN — RAMELTEON 8 MG: 8 TABLET, FILM COATED ORAL at 10:02

## 2018-02-23 RX ADMIN — IOHEXOL 30 ML: 350 INJECTION, SOLUTION INTRAVENOUS at 03:02

## 2018-02-23 RX ADMIN — PANTOPRAZOLE SODIUM 40 MG: 40 TABLET, DELAYED RELEASE ORAL at 04:02

## 2018-02-23 NOTE — H&P
PCP: Jan Petersen MD    History & Physical    Chief Complaint: Lower abdominal pain for 2 days    History of Present Illness:  Patient is a 62 y.o. female admitted to Hospitalist Service from Dr. Petersen's office with complaint of lower abdominal pain for 2 days. Patient reportedly has past medical history significant for recurrent acute diverticulitis (most recent episode one month ago, under care by Dr. Valera), hypertension, hyperlipidemia, CAD and chronic low back pain (under care by neurosurgeon in [place]). Patient had colonoscopy performed 1 month ago. Patient has been having lower abdominal pain for few days and intermittent fever 2 days. Patient is taking oral Cipro and Flagyl at the directions by Dr. Kohler for 7 days. No rectal bleeding or melena. Symptoms remind her of prior diverticulitis episode. Patient denied chest pain, shortness of breath, headache, vision changes, focal neuro-deficits, cough or fever.    Past Medical History:   Diagnosis Date    Allergy     Arthritis     Asthma     Coronary artery disease     Hypertension     Hyperthyroidism     Lumbar radiculitis 1/30/2018     Past Surgical History:   Procedure Laterality Date    APPENDECTOMY      CHOLECYSTECTOMY      SPINE SURGERY      TONSILLECTOMY       No family history on file.  Social History   Substance Use Topics    Smoking status: Current Every Day Smoker    Smokeless tobacco: Current User    Alcohol use 0.6 oz/week     1 Glasses of wine per week      Review of patient's allergies indicates:   Allergen Reactions    Fruit flavor Anaphylaxis     [place] apples     PTA Medications   Medication Sig    ciprofloxacin HCl (CIPRO) 500 MG tablet Take 500 mg by mouth every 12 (twelve) hours.    diphenhydrAMINE (BENADRYL) 25 mg capsule Take by mouth.    gabapentin (NEURONTIN) 300 MG capsule Take 1 capsule (300 mg total) by mouth 3 (three) times daily.    lisinopril (PRINIVIL,ZESTRIL) 20 MG tablet 20 mg every evening.      methylPREDNISolone (MEDROL DOSEPACK) 4 mg tablet Take 4 mg by mouth. use as directed    metroNIDAZOLE (FLAGYL) 500 MG tablet Take 500 mg by mouth every 8 (eight) hours.    oxyCODONE-acetaminophen (PERCOCET)  mg per tablet Take 1 tablet by mouth every 8 (eight) hours as needed for Pain. Sometimes doubles dose to cover pain    triamterene-hydrochlorothiazide 37.5-25 mg (DYAZIDE) 37.5-25 mg per capsule     albuterol (PROAIR HFA) 90 mcg/actuation inhaler Inhale 2 puffs into the lungs every 6 (six) hours as needed for Wheezing. Rescue    SUPREP BOWEL PREP KIT 17.5-3.13-1.6 gram SolR      Review of Systems:  Constitutional: see HPI  Eyes: no visual changes  Ears, nose, mouth, throat, and face: no nasal congestion or sore throat  Respiratory: no cough or shorness of breath  Cardiovascular: no chest pain or palpitations  Gastrointestinal: see HPI  Genitourinary: no hematuria or dysuria  Integument/breast: no rash or pruritis  Hematologic/lymphatic: no easy bruising or lymphadenopathy  Musculoskeletal: no arthralgias or myalgias  Neurological: no seizures or tremors.  Behavioral/Psych: no auditory or visual hallucinations  Endocrine: no heat or cold intolerance     OBJECTIVE:     Vital Signs (Most Recent)  Temp: 98.1 °F (36.7 °C) (02/23/18 1207)  Pulse: 77 (02/23/18 1207)  Resp: 16 (02/23/18 1207)  BP: 118/67 (02/23/18 1207)  SpO2: 99 % (02/23/18 1207)    Physical Exam:  General appearance: well developed, appears stated age  Head: normocephalic, atraumatic  Eyes:  conjunctivae/corneas clear. PERRL.  Nose: Nares normal. Septum midline.  Throat: lips, mucosa, and tongue normal; teeth and gums normal, no throat erythema.  Neck: supple, symmetrical, trachea midline, no JVD and thyroid not enlarged, symmetric, no tenderness/mass/nodules  Lungs:  clear to auscultation bilaterally and normal respiratory effort  Chest wall: no tenderness  Heart: regular rate and rhythm, S1, S2 normal, no murmur, click, rub or  gallop  Abdomen: soft, tenderness in LLQ, non-distented; bowel sounds normal; no masses,  no organomegaly  Extremities: no cyanosis, clubbing or edema.   Pulses: 2+ and symmetric  Skin: Skin color, texture, turgor normal. No rashes or lesions.  Lymph nodes: Cervical, supraclavicular, and axillary nodes normal.  Neurologic: Normal strength and tone. No focal numbness or weakness. CNII-XII intact.      Laboratory:   CBC:   Recent Labs  Lab 02/23/18  1319   WBC 11.20   RBC 4.26   HGB 13.4   HCT 39.9      MCV 94   MCH 31.3*   MCHC 33.5     CMP:   Recent Labs  Lab 02/23/18  1319      CALCIUM 11.4*      K 3.9   CO2 27      BUN 49*   CREATININE 1.2     Coagulation:   Recent Labs  Lab 02/23/18  1319   LABPROT 10.5   INR 1.0   APTT 23.8     Microbiology Results (last 7 days)     Procedure Component Value Units Date/Time    Urine Culture [[id number]]     Order Status:  No result Specimen:  Urine         Diagnostic Results:  CT abdomen: Pending.    Assessment/Plan:     Active Hospital Problems    Diagnosis  POA    *Lower abdominal pain [R10.30]  Yes    Diverticulitis [K57.92]  Case discussed with Dr. Petersen.  Obtain CT abdomen and pelvis with oral and IV contrast.  Start IV Zosyn and Falgyl.  Obtain Colonoscopy results from Dr. Valera.   Supportive care with PO narcotics and anti-emetics prn.    Yes    Hypertension [I10]  Chronic problem. Will continue chronic medications and monitor for any changes, adjusting as needed.    Yes    Hypercalcemia [E83.52]  Continue IVF hydration.  Hold HCTZ.  Check iPTH, 1,25 OH Vit. D, Phorphorus.  Yes       DVT prophylaxis: Lovenox 40 mg SQ q day.     Zac Novak MD  Department of [facility]   [facility]

## 2018-02-23 NOTE — PLAN OF CARE
Problem: Patient Care Overview  Goal: Plan of Care Review  Outcome: Ongoing (interventions implemented as appropriate)  Patient aao x 4. Complains of abdominal pain radiating to back, controlled with oral medication. IV fluids initiated. IV antibiotics given. Remained NPO.  Ambulates to restroom with standby assist. Reamined free from fall and injury. Bed in lowest position and call light within reach. Afebrile, vitals stable. Will continue to monitor.

## 2018-02-24 LAB
ALBUMIN SERPL BCP-MCNC: 3.4 G/DL
ALP SERPL-CCNC: 44 U/L
ALT SERPL W/O P-5'-P-CCNC: 23 U/L
ANION GAP SERPL CALC-SCNC: 5 MMOL/L
ANION GAP SERPL CALC-SCNC: 5 MMOL/L
AST SERPL-CCNC: 17 U/L
BASOPHILS # BLD AUTO: 0 K/UL
BASOPHILS NFR BLD: 0.4 %
BILIRUB SERPL-MCNC: 0.9 MG/DL
BUN SERPL-MCNC: 36 MG/DL
BUN SERPL-MCNC: 36 MG/DL
C DIFF GDH STL QL: NEGATIVE
C DIFF TOX A+B STL QL IA: NEGATIVE
CALCIUM SERPL-MCNC: 10.4 MG/DL
CALCIUM SERPL-MCNC: 10.4 MG/DL
CHLORIDE SERPL-SCNC: 108 MMOL/L
CHLORIDE SERPL-SCNC: 108 MMOL/L
CO2 SERPL-SCNC: 26 MMOL/L
CO2 SERPL-SCNC: 26 MMOL/L
CREAT SERPL-MCNC: 1.2 MG/DL
CREAT SERPL-MCNC: 1.2 MG/DL
DIFFERENTIAL METHOD: ABNORMAL
EOSINOPHIL # BLD AUTO: 0.1 K/UL
EOSINOPHIL NFR BLD: 0.9 %
ERYTHROCYTE [DISTWIDTH] IN BLOOD BY AUTOMATED COUNT: 12.9 %
EST. GFR  (AFRICAN AMERICAN): 56 ML/MIN/1.73 M^2
EST. GFR  (AFRICAN AMERICAN): 56 ML/MIN/1.73 M^2
EST. GFR  (NON AFRICAN AMERICAN): 48 ML/MIN/1.73 M^2
EST. GFR  (NON AFRICAN AMERICAN): 48 ML/MIN/1.73 M^2
GLUCOSE SERPL-MCNC: 88 MG/DL
GLUCOSE SERPL-MCNC: 88 MG/DL
HCT VFR BLD AUTO: 32.9 %
HGB BLD-MCNC: 11.2 G/DL
LYMPHOCYTES # BLD AUTO: 3.6 K/UL
LYMPHOCYTES NFR BLD: 53.9 %
MCH RBC QN AUTO: 31.6 PG
MCHC RBC AUTO-ENTMCNC: 33.9 G/DL
MCV RBC AUTO: 93 FL
MONOCYTES # BLD AUTO: 0.9 K/UL
MONOCYTES NFR BLD: 12.9 %
NEUTROPHILS # BLD AUTO: 2.1 K/UL
NEUTROPHILS NFR BLD: 31.9 %
PLATELET # BLD AUTO: 199 K/UL
PMV BLD AUTO: 7.4 FL
POTASSIUM SERPL-SCNC: 4.3 MMOL/L
POTASSIUM SERPL-SCNC: 4.3 MMOL/L
PROT SERPL-MCNC: 5.7 G/DL
RBC # BLD AUTO: 3.53 M/UL
SODIUM SERPL-SCNC: 139 MMOL/L
SODIUM SERPL-SCNC: 139 MMOL/L
WBC # BLD AUTO: 6.7 K/UL

## 2018-02-24 PROCEDURE — 80053 COMPREHEN METABOLIC PANEL: CPT

## 2018-02-24 PROCEDURE — 99900035 HC TECH TIME PER 15 MIN (STAT)

## 2018-02-24 PROCEDURE — G0378 HOSPITAL OBSERVATION PER HR: HCPCS

## 2018-02-24 PROCEDURE — 63600175 PHARM REV CODE 636 W HCPCS: Performed by: INTERNAL MEDICINE

## 2018-02-24 PROCEDURE — 25000003 PHARM REV CODE 250: Performed by: INTERNAL MEDICINE

## 2018-02-24 PROCEDURE — 36415 COLL VENOUS BLD VENIPUNCTURE: CPT

## 2018-02-24 PROCEDURE — S0030 INJECTION, METRONIDAZOLE: HCPCS | Performed by: INTERNAL MEDICINE

## 2018-02-24 PROCEDURE — 25000003 PHARM REV CODE 250: Performed by: NURSE PRACTITIONER

## 2018-02-24 PROCEDURE — 25000003 PHARM REV CODE 250: Performed by: FAMILY MEDICINE

## 2018-02-24 PROCEDURE — 85025 COMPLETE CBC W/AUTO DIFF WBC: CPT

## 2018-02-24 RX ORDER — HYDRALAZINE HYDROCHLORIDE 20 MG/ML
10 INJECTION INTRAMUSCULAR; INTRAVENOUS EVERY 8 HOURS PRN
Status: DISCONTINUED | OUTPATIENT
Start: 2018-02-24 | End: 2018-02-28 | Stop reason: HOSPADM

## 2018-02-24 RX ORDER — OXYCODONE AND ACETAMINOPHEN 5; 325 MG/1; MG/1
1 TABLET ORAL EVERY 12 HOURS PRN
Status: DISCONTINUED | OUTPATIENT
Start: 2018-02-24 | End: 2018-02-27

## 2018-02-24 RX ADMIN — GABAPENTIN 300 MG: 300 CAPSULE ORAL at 05:02

## 2018-02-24 RX ADMIN — ENOXAPARIN SODIUM 40 MG: 100 INJECTION SUBCUTANEOUS at 06:02

## 2018-02-24 RX ADMIN — GABAPENTIN 300 MG: 300 CAPSULE ORAL at 02:02

## 2018-02-24 RX ADMIN — OXYCODONE HYDROCHLORIDE AND ACETAMINOPHEN 1 TABLET: 10; 325 TABLET ORAL at 05:02

## 2018-02-24 RX ADMIN — PIPERACILLIN AND TAZOBACTAM 4.5 G: 4; .5 INJECTION, POWDER, LYOPHILIZED, FOR SOLUTION INTRAVENOUS; PARENTERAL at 05:02

## 2018-02-24 RX ADMIN — RAMELTEON 8 MG: 8 TABLET, FILM COATED ORAL at 08:02

## 2018-02-24 RX ADMIN — METRONIDAZOLE 500 MG: 500 INJECTION, SOLUTION INTRAVENOUS at 09:02

## 2018-02-24 RX ADMIN — OXYCODONE HYDROCHLORIDE AND ACETAMINOPHEN 1 TABLET: 10; 325 TABLET ORAL at 02:02

## 2018-02-24 RX ADMIN — SODIUM CHLORIDE 250 ML: 0.9 INJECTION, SOLUTION INTRAVENOUS at 07:02

## 2018-02-24 RX ADMIN — PIPERACILLIN AND TAZOBACTAM 4.5 G: 4; .5 INJECTION, POWDER, LYOPHILIZED, FOR SOLUTION INTRAVENOUS; PARENTERAL at 09:02

## 2018-02-24 RX ADMIN — METRONIDAZOLE 500 MG: 500 INJECTION, SOLUTION INTRAVENOUS at 01:02

## 2018-02-24 RX ADMIN — GABAPENTIN 300 MG: 300 CAPSULE ORAL at 09:02

## 2018-02-24 RX ADMIN — PANTOPRAZOLE SODIUM 40 MG: 40 TABLET, DELAYED RELEASE ORAL at 08:02

## 2018-02-24 RX ADMIN — METRONIDAZOLE 500 MG: 500 INJECTION, SOLUTION INTRAVENOUS at 05:02

## 2018-02-24 RX ADMIN — PIPERACILLIN AND TAZOBACTAM 4.5 G: 4; .5 INJECTION, POWDER, LYOPHILIZED, FOR SOLUTION INTRAVENOUS; PARENTERAL at 03:02

## 2018-02-24 NOTE — PLAN OF CARE
02/24/18 0810   Patient Assessment/Suction   Level of Consciousness (AVPU) alert   Respiratory Effort Unlabored;Normal   Expansion/Accessory Muscles/Retractions no retractions;no use of accessory muscles   All Lung Fields Breath Sounds clear;diminished   Rhythm/Pattern, Respiratory unlabored   PRE-TX-O2-ETCO2   O2 Device (Oxygen Therapy) room air   SpO2 98 %   Pulse Oximetry Type Intermittent   Aerosol Therapy   $ Aerosol Therapy Charges PRN treatment not required

## 2018-02-24 NOTE — NURSING
7:56 - Vitals taken. BP 78/41. Patient asymptomatic. Dr. Grimes notified. 500cc bolus ordered hold BP medication. Resume fluids after bolus and recheck BP.    8:56- BP 96/50. Up to bedside commode. No dizziness reported. Will continue to monitor

## 2018-02-24 NOTE — PROGRESS NOTES
02/23/18 2100   PRE-TX-O2-ETCO2   O2 Device (Oxygen Therapy) room air   Aerosol Therapy   $ Aerosol Therapy Charges PRN treatment not required

## 2018-02-24 NOTE — PROGRESS NOTES
Progress Note    Admit Date: 2/23/2018   LOS: 0 days     SUBJECTIVE:       Chief Complaint: Lower abdominal pain for 2 days     History of Present Illness:  Patient is a 62 y.o. female admitted to Hospitalist Service from Dr. Petersen's office with complaint of lower abdominal pain for 2 days. Patient reportedly has past medical history significant for recurrent acute diverticulitis (most recent episode one month ago, under care by Dr. Valera), hypertension, hyperlipidemia, CAD and chronic low back pain (under care by neurosurgeon in [place]). Patient had colonoscopy performed 1 month ago. Patient has been having lower abdominal pain for few days and intermittent fever 2 days. Patient is taking oral Cipro and Flagyl at the directions by Dr. Kohler for 7 days. No rectal bleeding or melena. Symptoms remind her of prior diverticulitis episode. Patient denied chest pain, shortness of breath, headache, vision changes, focal neuro-deficits, cough or fever.    Interval history   Patient seen and examined. Patient continues to complain of abdominal pain. Abdominal pain located left lower quadrant. Patient also have lower back pain that is radiating down left leg.       Scheduled Meds:   enoxaparin  40 mg Subcutaneous Daily    gabapentin  300 mg Oral TID    metronidazole  500 mg Intravenous Q8H    pantoprazole  40 mg Oral Daily    piperacillin-tazobactam 4.5 g in sodium chloride 0.9% 100 mL IVPB (ready to mix system)  4.5 g Intravenous Q8H    sodium chloride 0.9%  500 mL Intravenous Once     Continuous Infusions:   sodium chloride 0.9% 125 mL/hr at 02/23/18 1559     PRN Meds:acetaminophen, acetaminophen, albuterol sulfate, dextrose 50%, dextrose 50%, glucagon (human recombinant), glucose, glucose, hydrALAZINE, ondansetron, oxyCODONE-acetaminophen, ramelteon, sodium chloride 0.9%    Review of patient's allergies indicates:   Allergen Reactions    Fruit flavor Anaphylaxis     [place] apples       Review of  Systems  Constitutional: see HPI  Eyes: no visual changes  Ears, nose, mouth, throat, and face: no nasal congestion or sore throat  Respiratory: no cough or shorness of breath  Cardiovascular: no chest pain or palpitations  Gastrointestinal: see HPI  Genitourinary: no hematuria or dysuria  Integument/breast: no rash or pruritis  Hematologic/lymphatic: no easy bruising or lymphadenopathy  Musculoskeletal: + back pain   Neurological: no seizures or tremors.  Behavioral/Psych: no auditory or visual hallucinations  Endocrine: no heat or cold intolerance    OBJECTIVE:     Vital Signs (Most Recent)  Temp: 97 °F (36.1 °C) (02/24/18 0756)  Pulse: 85 (02/24/18 1021)  Resp: 14 (02/24/18 0756)  BP: (!) 114/55 (02/24/18 1021)  SpO2: 98 % (02/24/18 0810)    Vital Signs Range (Last 24H):  Temp:  [96.3 °F (35.7 °C)-98.2 °F (36.8 °C)]   Pulse:  [56-85]   Resp:  [14-20]   BP: ()/(41-70)   SpO2:  [92 %-100 %]     I & O (Last 24H):  Intake/Output Summary (Last 24 hours) at 02/24/18 1118  Last data filed at 02/24/18 0448   Gross per 24 hour   Intake          1802.08 ml   Output                0 ml   Net          1802.08 ml     Physical Exam:    General appearance: well developed, appears stated age  Head: normocephalic, atraumatic  Eyes:  conjunctivae/corneas clear. PERRL.  Nose: Nares normal. Septum midline.  Throat: lips, mucosa, and tongue normal; teeth and gums normal, no throat erythema.  Neck: supple, symmetrical, trachea midline, no JVD and thyroid not enlarged, symmetric, no tenderness/mass/nodules  Lungs:  clear to auscultation bilaterally and normal respiratory effort  Chest wall: no tenderness  Heart: regular rate and rhythm, S1, S2 normal, no murmur, click, rub or gallop  Abdomen: soft, tenderness in LLQ, non-distented; bowel sounds normal; no masses,  no organomegaly  Extremities: no cyanosis, clubbing or edema.   Pulses: 2+ and symmetric  Skin: Skin color, texture, turgor normal. No rashes or lesions.  Lymph nodes:  Cervical, supraclavicular, and axillary nodes normal.  Neurologic: Normal strength and tone. No focal numbness or weakness. CNII-XII intact.    Laboratory:  CBC:   Recent Labs  Lab 02/24/18  0526   WBC 6.70   RBC 3.53*   HGB 11.2*   HCT 32.9*      MCV 93   MCH 31.6*   MCHC 33.9     CMP:   Recent Labs  Lab 02/24/18  0526   GLU 88  88   CALCIUM 10.4  10.4   ALBUMIN 3.4*   PROT 5.7*     139   K 4.3  4.3   CO2 26  26     108   BUN 36*  36*   CREATININE 1.2  1.2   ALKPHOS 44*   ALT 23   AST 17   BILITOT 0.9       Diagnostic Results:  Reviewed    ASSESSMENT/PLAN:      *Lower abdominal pain [R10.30]   Yes    [id number]]  Case discussed with Dr. Petersen.  Obtain CT abdomen and pelvis with oral and IV contrast.  Start IV Zosyn and Falgyl.  Obtain Colonoscopy results from Dr. Valera.   Supportive care with PO narcotics and anti-emetics prn.   clear liquid diet   Yes    Hypertension [I10]  Chronic problem. Will continue chronic medications and monitor for any changes, adjusting as needed.      Yes    Hypercalcemia [E83.52]  Continue IVF hydration.  Hold HCTZ.  Check iPTH, 1,25 OH Vit. D, Phorphorus.   Yes   Cholelithiasis  -chronic if pain persists consider general surgery consult    Alok Grimes MD  [facility]  2/24/2018 11:21 AM

## 2018-02-25 LAB
ABO + RH BLD: NORMAL
ALBUMIN SERPL BCP-MCNC: 2.9 G/DL
ALP SERPL-CCNC: 39 U/L
ALT SERPL W/O P-5'-P-CCNC: 23 U/L
ANION GAP SERPL CALC-SCNC: 7 MMOL/L
ANION GAP SERPL CALC-SCNC: 7 MMOL/L
AST SERPL-CCNC: 18 U/L
BACTERIA UR CULT: NO GROWTH
BASOPHILS # BLD AUTO: 0 K/UL
BASOPHILS NFR BLD: 0.6 %
BILIRUB SERPL-MCNC: 0.5 MG/DL
BLD GP AB SCN CELLS X3 SERPL QL: NORMAL
BUN SERPL-MCNC: 26 MG/DL
BUN SERPL-MCNC: 26 MG/DL
CALCIUM SERPL-MCNC: 9.6 MG/DL
CALCIUM SERPL-MCNC: 9.6 MG/DL
CHLORIDE SERPL-SCNC: 113 MMOL/L
CHLORIDE SERPL-SCNC: 113 MMOL/L
CO2 SERPL-SCNC: 24 MMOL/L
CO2 SERPL-SCNC: 24 MMOL/L
CREAT SERPL-MCNC: 1 MG/DL
CREAT SERPL-MCNC: 1 MG/DL
DIFFERENTIAL METHOD: ABNORMAL
E COLI SXT1 STL QL IA: NEGATIVE
E COLI SXT2 STL QL IA: NEGATIVE
EOSINOPHIL # BLD AUTO: 0.1 K/UL
EOSINOPHIL NFR BLD: 2.2 %
ERYTHROCYTE [DISTWIDTH] IN BLOOD BY AUTOMATED COUNT: 12.9 %
EST. GFR  (AFRICAN AMERICAN): >60 ML/MIN/1.73 M^2
EST. GFR  (AFRICAN AMERICAN): >60 ML/MIN/1.73 M^2
EST. GFR  (NON AFRICAN AMERICAN): >60 ML/MIN/1.73 M^2
EST. GFR  (NON AFRICAN AMERICAN): >60 ML/MIN/1.73 M^2
FERRITIN SERPL-MCNC: 244 NG/ML
GLUCOSE SERPL-MCNC: 89 MG/DL
GLUCOSE SERPL-MCNC: 89 MG/DL
HCT VFR BLD AUTO: 29.4 %
HCT VFR BLD AUTO: 33.3 %
HGB BLD-MCNC: 11 G/DL
HGB BLD-MCNC: 9.9 G/DL
LYMPHOCYTES # BLD AUTO: 2.3 K/UL
LYMPHOCYTES NFR BLD: 51.8 %
MCH RBC QN AUTO: 31.6 PG
MCHC RBC AUTO-ENTMCNC: 33.8 G/DL
MCV RBC AUTO: 94 FL
MONOCYTES # BLD AUTO: 0.5 K/UL
MONOCYTES NFR BLD: 10.6 %
NEUTROPHILS # BLD AUTO: 1.6 K/UL
NEUTROPHILS NFR BLD: 34.8 %
PLATELET # BLD AUTO: 165 K/UL
PMV BLD AUTO: 8.1 FL
POTASSIUM SERPL-SCNC: 3.9 MMOL/L
POTASSIUM SERPL-SCNC: 3.9 MMOL/L
PROT SERPL-MCNC: 4.9 G/DL
RBC # BLD AUTO: 3.14 M/UL
SODIUM SERPL-SCNC: 144 MMOL/L
SODIUM SERPL-SCNC: 144 MMOL/L
WBC # BLD AUTO: 4.5 K/UL

## 2018-02-25 PROCEDURE — 83540 ASSAY OF IRON: CPT

## 2018-02-25 PROCEDURE — 85018 HEMOGLOBIN: CPT

## 2018-02-25 PROCEDURE — 25000003 PHARM REV CODE 250: Performed by: FAMILY MEDICINE

## 2018-02-25 PROCEDURE — 85025 COMPLETE CBC W/AUTO DIFF WBC: CPT

## 2018-02-25 PROCEDURE — C9113 INJ PANTOPRAZOLE SODIUM, VIA: HCPCS | Performed by: FAMILY MEDICINE

## 2018-02-25 PROCEDURE — 25000003 PHARM REV CODE 250: Performed by: NURSE PRACTITIONER

## 2018-02-25 PROCEDURE — 82728 ASSAY OF FERRITIN: CPT

## 2018-02-25 PROCEDURE — 25000003 PHARM REV CODE 250: Performed by: INTERNAL MEDICINE

## 2018-02-25 PROCEDURE — S0030 INJECTION, METRONIDAZOLE: HCPCS | Performed by: INTERNAL MEDICINE

## 2018-02-25 PROCEDURE — 99900035 HC TECH TIME PER 15 MIN (STAT)

## 2018-02-25 PROCEDURE — 63600175 PHARM REV CODE 636 W HCPCS: Performed by: INTERNAL MEDICINE

## 2018-02-25 PROCEDURE — 85014 HEMATOCRIT: CPT

## 2018-02-25 PROCEDURE — G0378 HOSPITAL OBSERVATION PER HR: HCPCS

## 2018-02-25 PROCEDURE — 80053 COMPREHEN METABOLIC PANEL: CPT

## 2018-02-25 PROCEDURE — 63600175 PHARM REV CODE 636 W HCPCS: Performed by: FAMILY MEDICINE

## 2018-02-25 PROCEDURE — 86850 RBC ANTIBODY SCREEN: CPT

## 2018-02-25 PROCEDURE — 36415 COLL VENOUS BLD VENIPUNCTURE: CPT

## 2018-02-25 RX ORDER — PANTOPRAZOLE SODIUM 40 MG/10ML
40 INJECTION, POWDER, LYOPHILIZED, FOR SOLUTION INTRAVENOUS DAILY
Status: DISCONTINUED | OUTPATIENT
Start: 2018-02-25 | End: 2018-02-26

## 2018-02-25 RX ADMIN — OXYCODONE AND ACETAMINOPHEN 1 TABLET: 5; 325 TABLET ORAL at 03:02

## 2018-02-25 RX ADMIN — GABAPENTIN 300 MG: 300 CAPSULE ORAL at 03:02

## 2018-02-25 RX ADMIN — SODIUM CHLORIDE 500 ML: 0.9 INJECTION, SOLUTION INTRAVENOUS at 12:02

## 2018-02-25 RX ADMIN — OXYCODONE AND ACETAMINOPHEN 1 TABLET: 5; 325 TABLET ORAL at 04:02

## 2018-02-25 RX ADMIN — PANTOPRAZOLE SODIUM 40 MG: 40 INJECTION, POWDER, FOR SOLUTION INTRAVENOUS at 03:02

## 2018-02-25 RX ADMIN — RAMELTEON 8 MG: 8 TABLET, FILM COATED ORAL at 09:02

## 2018-02-25 RX ADMIN — METRONIDAZOLE 500 MG: 500 INJECTION, SOLUTION INTRAVENOUS at 03:02

## 2018-02-25 RX ADMIN — PIPERACILLIN AND TAZOBACTAM 4.5 G: 4; .5 INJECTION, POWDER, LYOPHILIZED, FOR SOLUTION INTRAVENOUS; PARENTERAL at 05:02

## 2018-02-25 RX ADMIN — PIPERACILLIN AND TAZOBACTAM 4.5 G: 4; .5 INJECTION, POWDER, LYOPHILIZED, FOR SOLUTION INTRAVENOUS; PARENTERAL at 09:02

## 2018-02-25 RX ADMIN — METRONIDAZOLE 500 MG: 500 INJECTION, SOLUTION INTRAVENOUS at 09:02

## 2018-02-25 RX ADMIN — METRONIDAZOLE 500 MG: 500 INJECTION, SOLUTION INTRAVENOUS at 05:02

## 2018-02-25 RX ADMIN — GABAPENTIN 300 MG: 300 CAPSULE ORAL at 09:02

## 2018-02-25 RX ADMIN — GABAPENTIN 300 MG: 300 CAPSULE ORAL at 05:02

## 2018-02-25 RX ADMIN — PIPERACILLIN AND TAZOBACTAM 4.5 G: 4; .5 INJECTION, POWDER, LYOPHILIZED, FOR SOLUTION INTRAVENOUS; PARENTERAL at 03:02

## 2018-02-25 RX ADMIN — PANTOPRAZOLE SODIUM 40 MG: 40 TABLET, DELAYED RELEASE ORAL at 09:02

## 2018-02-25 NOTE — NURSING
16:30 - MD notified of blood pressure 88/44 , asymptomatic. Recheck . If not improving call Dr. Grimes.    18:38- BP 86/49 lying, 102/55 standing. Asymptomatic. Dr. Grimes notified. 250 cc Bolus ordered. Oral pain medication reduced.

## 2018-02-25 NOTE — PROGRESS NOTES
Progress Note    Admit Date: 2/23/2018   LOS: 0 days     SUBJECTIVE:       Chief Complaint: Lower abdominal pain for 2 days     History of Present Illness:  Patient is a 62 y.o. female admitted to Hospitalist Service from Dr. Petersen's office with complaint of lower abdominal pain for 2 days. Patient reportedly has past medical history significant for recurrent acute diverticulitis (most recent episode one month ago, under care by Dr. Valera), hypertension, hyperlipidemia, CAD and chronic low back pain (under care by neurosurgeon in [place]). Patient had colonoscopy performed 1 month ago. Patient has been having lower abdominal pain for few days and intermittent fever 2 days. Patient is taking oral Cipro and Flagyl at the directions by Dr. Kohler for 7 days. No rectal bleeding or melena. Symptoms remind her of prior diverticulitis episode. Patient denied chest pain, shortness of breath, headache, vision changes, focal neuro-deficits, cough or fever.    Interval history   Patient seen and examined. Patients has been hypotensive  After receiving pain medications. Have decreased daily pain medications. Patient main concern at this time is lower back and left sided hip pain. Abdominal pain is still present but not worsening. Patient has had acute drop in H/H follow up GI for possible GI bleed.       Scheduled Meds:   gabapentin  300 mg Oral TID    metronidazole  500 mg Intravenous Q8H    pantoprazole  40 mg Oral Daily    piperacillin-tazobactam 4.5 g in sodium chloride 0.9% 100 mL IVPB (ready to mix system)  4.5 g Intravenous Q8H    sodium chloride 0.9%  500 mL Intravenous Once     Continuous Infusions:   sodium chloride 0.9% 125 mL/hr at 02/23/18 1559     PRN Meds:acetaminophen, acetaminophen, albuterol sulfate, dextrose 50%, dextrose 50%, glucagon (human recombinant), glucose, glucose, hydrALAZINE, ondansetron, oxyCODONE-acetaminophen, ramelteon, sodium chloride 0.9%    Review of patient's allergies  indicates:   Allergen Reactions    Fruit flavor Anaphylaxis     [place] apples       Review of Systems  Constitutional: see HPI  Eyes: no visual changes  Ears, nose, mouth, throat, and face: no nasal congestion or sore throat  Respiratory: no cough or shorness of breath  Cardiovascular: no chest pain or palpitations  Gastrointestinal: see HPI  Genitourinary: no hematuria or dysuria  Integument/breast: no rash or pruritis  Hematologic/lymphatic: no easy bruising or lymphadenopathy  Musculoskeletal: + back pain   Neurological: no seizures or tremors.  Behavioral/Psych: no auditory or visual hallucinations  Endocrine: no heat or cold intolerance    OBJECTIVE:     Vital Signs (Most Recent)  Temp: 98.1 °F (36.7 °C) (02/25/18 1212)  Pulse: 76 (02/25/18 1212)  Resp: 18 (02/25/18 1212)  BP: 132/61 (02/25/18 1212)  SpO2: 98 % (02/25/18 1212)    Vital Signs Range (Last 24H):  Temp:  [97.6 °F (36.4 °C)-99.1 °F (37.3 °C)]   Pulse:  [61-82]   Resp:  [16-18]   BP: ()/(44-61)   SpO2:  [93 %-99 %]     I & O (Last 24H):    Intake/Output Summary (Last 24 hours) at 02/25/18 1349  Last data filed at 02/25/18 0418   Gross per 24 hour   Intake             2075 ml   Output                0 ml   Net             2075 ml     Physical Exam:    General appearance: well developed, appears stated age  Head: normocephalic, atraumatic  Eyes:  conjunctivae/corneas clear. PERRL.  Nose: Nares normal. Septum midline.  Throat: lips, mucosa, and tongue normal; teeth and gums normal, no throat erythema.  Neck: supple, symmetrical, trachea midline, no JVD and thyroid not enlarged, symmetric, no tenderness/mass/nodules  Lungs:  clear to auscultation bilaterally and normal respiratory effort  Chest wall: no tenderness  Heart: regular rate and rhythm, S1, S2 normal, no murmur, click, rub or gallop  Abdomen: soft, tenderness in LLQ, non-distented; bowel sounds normal; no masses,  no organomegaly  Extremities: no cyanosis, clubbing or edema.   Pulses: 2+  and symmetric  Skin: Skin color, texture, turgor normal. No rashes or lesions.  Lymph nodes: Cervical, supraclavicular, and axillary nodes normal.  Neurologic: Normal strength and tone. No focal numbness or weakness. CNII-XII intact.    Laboratory:  CBC:     Recent Labs  Lab 02/25/18  0511   WBC 4.50   RBC 3.14*   HGB 9.9*   HCT 29.4*      MCV 94   MCH 31.6*   MCHC 33.8     CMP:     Recent Labs  Lab 02/25/18  0511   GLU 89  89   CALCIUM 9.6  9.6   ALBUMIN 2.9*   PROT 4.9*     144   K 3.9  3.9   CO2 24  24   *  113*   BUN 26*  26*   CREATININE 1.0  1.0   ALKPHOS 39*   ALT 23   AST 18   BILITOT 0.5       Diagnostic Results:  Reviewed    ASSESSMENT/PLAN:      *Lower abdominal pain [R10.30]   Yes    [id number]]  Case discussed with Dr. Petersen.  Obtain CT abdomen and pelvis with oral and IV contrast.  Start IV Zosyn and Falgyl.  Obtain Colonoscopy results from Dr. Valera.   Supportive care with PO narcotics and anti-emetics prn.   clear liquid diet   Yes    Hypertension [I10]  Chronic problem. Will continue chronic medications and monitor for any changes, adjusting as needed.      Yes    Hypercalcemia [E83.52]  Continue IVF hydration.  Hold HCTZ.  Check iPTH, 1,25 OH Vit. D, Phorphorus.   Yes   Cholelithiasis  -chronic if pain persists consider general surgery consult    GI bleed ?  - patient has sudden drop in H/H  Increased BUN   - patient has been hypotensive over past 24-48 hrs  - hold Lovenox await GI consult    Lower back pain/ left sided hip pain  - follow up hip X-ray   -follow up MRI lower back as outpatent    Alok Grimes MD  [facility]    2/25/2018 11:21 AM

## 2018-02-25 NOTE — PLAN OF CARE
Problem: Patient Care Overview  Goal: Plan of Care Review  Outcome: Ongoing (interventions implemented as appropriate)  Patient aao x 4. Complains of Left groin pain and upper thigh pain. Mild relief with oral medication. Plan of care reviewed with patient, verbalized understanding. Blood pressure monitored throughout day. IV antibiotics given. IV fluids maintained. Ambulates to restroom with standby assist. Diarrhea noted. Tolerating clear liquid diet. Patient remained free from fall and injury. Bed in lowest position and call light within  reach.

## 2018-02-25 NOTE — NURSING
Patient BP 76/44.  Patient asymptomatic.  Notified JULIET العراقي NP, see orders, bolus in progress.   none

## 2018-02-25 NOTE — PLAN OF CARE
Patient reports needing assistance with ADL's and does not drive, denies POA, living will or HH, Pharmacy is [facility], PCP is Dr. Petersen, Plans to return home with no needs.      02/24/18 1900   Discharge Assessment   Assessment Type Discharge Planning Assessment   Confirmed/corrected address and phone number on facesheet? Yes   Assessment information obtained from? Patient   Communicated expected length of stay with patient/caregiver yes   Prior to hospitilization cognitive status: Alert/Oriented   Prior to hospitalization functional status: Independent   Current cognitive status: Alert/Oriented   Current Functional Status: Independent   Lives With spouse   Able to Return to Prior Arrangements yes   Is patient able to care for self after discharge? Yes   Patient's perception of discharge disposition home or selfcare   Readmission Within The Last 30 Days no previous admission in last 30 days   Patient currently being followed by outpatient case management? No   Patient currently receives any other outside agency services? No   Equipment Currently Used at Home none   Do you have any problems affording any of your prescribed medications? No   Is the patient taking medications as prescribed? yes   Does the patient have transportation home? Yes   Transportation Available family or friend will provide   Does the patient receive services at the [facility]? No   Discharge Plan A Home   Patient/Family In Agreement With Plan yes      Parents

## 2018-02-25 NOTE — PLAN OF CARE
02/25/18 0835   Patient Assessment/Suction   Level of Consciousness (AVPU) alert   Respiratory Effort Unlabored;Normal   Expansion/Accessory Muscles/Retractions no use of accessory muscles;no retractions   All Lung Fields Breath Sounds clear;diminished   Rhythm/Pattern, Respiratory unlabored;pattern regular;depth regular   PRE-TX-O2-ETCO2   O2 Device (Oxygen Therapy) room air   SpO2 99 %   Pulse Oximetry Type Intermittent   Aerosol Therapy   $ Aerosol Therapy Charges PRN treatment not required

## 2018-02-26 PROBLEM — R93.5 ABNORMAL ABDOMINAL CT SCAN: Status: ACTIVE | Noted: 2018-02-26

## 2018-02-26 LAB
ALBUMIN SERPL BCP-MCNC: 3 G/DL
ALP SERPL-CCNC: 39 U/L
ALT SERPL W/O P-5'-P-CCNC: 20 U/L
ANION GAP SERPL CALC-SCNC: 5 MMOL/L
ANION GAP SERPL CALC-SCNC: 5 MMOL/L
AST SERPL-CCNC: 17 U/L
BASOPHILS # BLD AUTO: 0 K/UL
BASOPHILS NFR BLD: 0.3 %
BILIRUB SERPL-MCNC: 0.5 MG/DL
BUN SERPL-MCNC: 27 MG/DL
BUN SERPL-MCNC: 27 MG/DL
CALCIUM SERPL-MCNC: 9.5 MG/DL
CALCIUM SERPL-MCNC: 9.5 MG/DL
CHLORIDE SERPL-SCNC: 114 MMOL/L
CHLORIDE SERPL-SCNC: 114 MMOL/L
CO2 SERPL-SCNC: 24 MMOL/L
CO2 SERPL-SCNC: 24 MMOL/L
CREAT SERPL-MCNC: 1 MG/DL
CREAT SERPL-MCNC: 1 MG/DL
DIFFERENTIAL METHOD: ABNORMAL
EOSINOPHIL # BLD AUTO: 0.1 K/UL
EOSINOPHIL NFR BLD: 1.8 %
ERYTHROCYTE [DISTWIDTH] IN BLOOD BY AUTOMATED COUNT: 12.9 %
EST. GFR  (AFRICAN AMERICAN): >60 ML/MIN/1.73 M^2
EST. GFR  (AFRICAN AMERICAN): >60 ML/MIN/1.73 M^2
EST. GFR  (NON AFRICAN AMERICAN): >60 ML/MIN/1.73 M^2
EST. GFR  (NON AFRICAN AMERICAN): >60 ML/MIN/1.73 M^2
GLUCOSE SERPL-MCNC: 96 MG/DL
GLUCOSE SERPL-MCNC: 96 MG/DL
HCT VFR BLD AUTO: 30.8 %
HGB BLD-MCNC: 10.1 G/DL
IRON SERPL-MCNC: 87 UG/DL
LYMPHOCYTES # BLD AUTO: 2.1 K/UL
LYMPHOCYTES NFR BLD: 37.5 %
MCH RBC QN AUTO: 31.2 PG
MCHC RBC AUTO-ENTMCNC: 32.9 G/DL
MCV RBC AUTO: 95 FL
MONOCYTES # BLD AUTO: 0.7 K/UL
MONOCYTES NFR BLD: 11.6 %
NEUTROPHILS # BLD AUTO: 2.8 K/UL
NEUTROPHILS NFR BLD: 48.8 %
OB PNL STL: NEGATIVE
PLATELET # BLD AUTO: 168 K/UL
PMV BLD AUTO: 8.1 FL
POTASSIUM SERPL-SCNC: 3.8 MMOL/L
POTASSIUM SERPL-SCNC: 3.8 MMOL/L
PROT SERPL-MCNC: 5 G/DL
RBC # BLD AUTO: 3.25 M/UL
SATURATED IRON: 36 %
SODIUM SERPL-SCNC: 143 MMOL/L
SODIUM SERPL-SCNC: 143 MMOL/L
TOTAL IRON BINDING CAPACITY: 244 UG/DL
TRANSFERRIN SERPL-MCNC: 165 MG/DL
WBC # BLD AUTO: 5.7 K/UL

## 2018-02-26 PROCEDURE — 97530 THERAPEUTIC ACTIVITIES: CPT

## 2018-02-26 PROCEDURE — 97162 PT EVAL MOD COMPLEX 30 MIN: CPT

## 2018-02-26 PROCEDURE — S0030 INJECTION, METRONIDAZOLE: HCPCS | Performed by: INTERNAL MEDICINE

## 2018-02-26 PROCEDURE — G8978 MOBILITY CURRENT STATUS: HCPCS | Mod: CK

## 2018-02-26 PROCEDURE — 63600175 PHARM REV CODE 636 W HCPCS: Performed by: INTERNAL MEDICINE

## 2018-02-26 PROCEDURE — 25000003 PHARM REV CODE 250: Performed by: FAMILY MEDICINE

## 2018-02-26 PROCEDURE — 99900035 HC TECH TIME PER 15 MIN (STAT)

## 2018-02-26 PROCEDURE — 94761 N-INVAS EAR/PLS OXIMETRY MLT: CPT

## 2018-02-26 PROCEDURE — G8979 MOBILITY GOAL STATUS: HCPCS | Mod: CI

## 2018-02-26 PROCEDURE — 36415 COLL VENOUS BLD VENIPUNCTURE: CPT

## 2018-02-26 PROCEDURE — 25000003 PHARM REV CODE 250: Performed by: INTERNAL MEDICINE

## 2018-02-26 PROCEDURE — 80053 COMPREHEN METABOLIC PANEL: CPT

## 2018-02-26 PROCEDURE — 85025 COMPLETE CBC W/AUTO DIFF WBC: CPT

## 2018-02-26 PROCEDURE — 99254 IP/OBS CNSLTJ NEW/EST MOD 60: CPT | Mod: ,,, | Performed by: INTERNAL MEDICINE

## 2018-02-26 PROCEDURE — 25000003 PHARM REV CODE 250: Performed by: NURSE PRACTITIONER

## 2018-02-26 PROCEDURE — 12000002 HC ACUTE/MED SURGE SEMI-PRIVATE ROOM

## 2018-02-26 PROCEDURE — 82272 OCCULT BLD FECES 1-3 TESTS: CPT

## 2018-02-26 PROCEDURE — 99233 SBSQ HOSP IP/OBS HIGH 50: CPT | Mod: ,,, | Performed by: INTERNAL MEDICINE

## 2018-02-26 RX ADMIN — PIPERACILLIN AND TAZOBACTAM 4.5 G: 4; .5 INJECTION, POWDER, LYOPHILIZED, FOR SOLUTION INTRAVENOUS; PARENTERAL at 05:02

## 2018-02-26 RX ADMIN — RAMELTEON 8 MG: 8 TABLET, FILM COATED ORAL at 10:02

## 2018-02-26 RX ADMIN — PIPERACILLIN AND TAZOBACTAM 4.5 G: 4; .5 INJECTION, POWDER, LYOPHILIZED, FOR SOLUTION INTRAVENOUS; PARENTERAL at 03:02

## 2018-02-26 RX ADMIN — GABAPENTIN 300 MG: 300 CAPSULE ORAL at 02:02

## 2018-02-26 RX ADMIN — GABAPENTIN 300 MG: 300 CAPSULE ORAL at 10:02

## 2018-02-26 RX ADMIN — METRONIDAZOLE 500 MG: 500 INJECTION, SOLUTION INTRAVENOUS at 10:02

## 2018-02-26 RX ADMIN — SODIUM CHLORIDE: 0.9 INJECTION, SOLUTION INTRAVENOUS at 09:02

## 2018-02-26 RX ADMIN — SODIUM CHLORIDE: 0.9 INJECTION, SOLUTION INTRAVENOUS at 10:02

## 2018-02-26 RX ADMIN — PIPERACILLIN AND TAZOBACTAM 4.5 G: 4; .5 INJECTION, POWDER, LYOPHILIZED, FOR SOLUTION INTRAVENOUS; PARENTERAL at 10:02

## 2018-02-26 RX ADMIN — METRONIDAZOLE 500 MG: 500 INJECTION, SOLUTION INTRAVENOUS at 02:02

## 2018-02-26 RX ADMIN — OXYCODONE AND ACETAMINOPHEN 1 TABLET: 5; 325 TABLET ORAL at 04:02

## 2018-02-26 RX ADMIN — METRONIDAZOLE 500 MG: 500 INJECTION, SOLUTION INTRAVENOUS at 05:02

## 2018-02-26 RX ADMIN — PANTOPRAZOLE SODIUM 40 MG: 40 TABLET, DELAYED RELEASE ORAL at 09:02

## 2018-02-26 RX ADMIN — GABAPENTIN 300 MG: 300 CAPSULE ORAL at 05:02

## 2018-02-26 RX ADMIN — OXYCODONE AND ACETAMINOPHEN 1 TABLET: 5; 325 TABLET ORAL at 05:02

## 2018-02-26 NOTE — PROGRESS NOTES
02/26/18 0818   Patient Assessment/Suction   Level of Consciousness (AVPU) alert   PRE-TX-O2-ETCO2   O2 Device (Oxygen Therapy) room air   SpO2 99 %   Pulse Oximetry Type Intermittent   $ Pulse Oximetry - Multiple Charge Pulse Oximetry - Multiple   Aerosol Therapy   $ Aerosol Therapy Charges PRN treatment not required   Respiratory Treatment Status PRN treatment not required

## 2018-02-26 NOTE — PROGRESS NOTES
Progress Note  Hospital Medicine  Patient Name:Nancy Muñoz  MRN:  [id number]  Patient Class: OP- Observation  Admit Date: 2/23/2018  Length of Stay: 0 days  Expected Discharge Date:   Attending Physician: Zac Novak MD  Primary Care Provider:  Jan Petersen MD    SUBJECTIVE:     Principal Problem: Lower abdominal pain  Initial history of present illness: Patient is a 62 y.o. female admitted to Hospitalist Service from Dr. Petersen's office with complaint of lower abdominal pain for 2 days. Patient reportedly has past medical history significant for recurrent acute diverticulitis (most recent episode one month ago, under care by Dr. Valera), hypertension, hyperlipidemia, CAD and chronic low back pain (under care by neurosurgeon in [place]). Patient had colonoscopy performed 1 month ago. Patient has been having lower abdominal pain for few days and intermittent fever 2 days. Patient is taking oral Cipro and Flagyl at the directions by Dr. Kohler for 7 days. No rectal bleeding or melena. Symptoms remind her of prior diverticulitis episode. Patient denied chest pain, shortness of breath, headache, vision changes, focal neuro-deficits, cough or fever.       PMH/PSH/SH/FH/Meds: reviewed.    Symptoms/Review of Systems:  Patient is reporting improving abdominal pain. No shortness of breath, cough, chest pain or headache, fever.     Diet:  Adequate intake.    Activity level: Normal.    Pain: Chronic lower back pain     OBJECTIVE:   Vital Signs (Most Recent):      Temp: 98.3 °F (36.8 °C) (02/26/18 0745)  Pulse: 61 (02/26/18 0745)  Resp: 16 (02/26/18 0745)  BP: 115/60 (02/26/18 0745)  SpO2: 99 % (02/26/18 0818)       Vital Signs Range (Last 24H):  Temp:  [97.5 °F (36.4 °C)-98.3 °F (36.8 °C)]   Pulse:  [61-84]   Resp:  [16-18]   BP: ()/(46-69)   SpO2:  [96 %-99 %]     Weight: 86 kg (189 lb 11.2 oz)  Body mass index is 33.6 kg/m².    Intake/Output Summary (Last 24 hours) at 02/26/18 1023  Last data filed at  02/26/18 0501   Gross per 24 hour   Intake          3117.08 ml   Output                0 ml   Net          3117.08 ml     Physical Examination:  General appearance: well developed, appears stated age  Head: normocephalic, atraumatic  Eyes:  conjunctivae/corneas clear. PERRL.  Nose: Nares normal. Septum midline.  Throat: lips, mucosa, and tongue normal; teeth and gums normal, no throat erythema.  Neck: supple, symmetrical, trachea midline, no JVD and thyroid not enlarged, symmetric, no tenderness/mass/nodules  Lungs:  clear to auscultation bilaterally and normal respiratory effort  Chest wall: no tenderness  Heart: regular rate and rhythm, S1, S2 normal, no murmur, click, rub or gallop  Abdomen: soft, tenderness in LLQ, non-distented; bowel sounds normal; no masses,  no organomegaly  Extremities: no cyanosis, clubbing or edema.   Pulses: 2+ and symmetric  Skin: Skin color, texture, turgor normal. No rashes or lesions.  Lymph nodes: Cervical, supraclavicular, and axillary nodes normal.  Neurologic: Normal strength and tone. No focal numbness or weakness. CNII-XII intact.      CBC:    Recent Labs  Lab 02/24/18  0526 02/25/18  0511 02/25/18  1452 02/26/18  0516   WBC 6.70 4.50  --  5.70   RBC 3.53* 3.14*  --  3.25*   HGB 11.2* 9.9* 11.0* 10.1*   HCT 32.9* 29.4* 33.3* 30.8*    165  --  168   MCV 93 94  --  95   MCH 31.6* 31.6*  --  31.2*   MCHC 33.9 33.8  --  32.9   BMP    Recent Labs  Lab 02/24/18  0526 02/25/18  0511 02/26/18  0516   GLU 88  88 89  89 96  96     139 144  144 143  143   K 4.3  4.3 3.9  3.9 3.8  3.8     108 113*  113* 114*  114*   CO2 26  26 24  24 24  24   BUN 36*  36* 26*  26* 27*  27*   CREATININE 1.2  1.2 1.0  1.0 1.0  1.0   CALCIUM 10.4  10.4 9.6  9.6 9.5  9.5      Diagnostic Results:  Microbiology Results (last 7 days)     Procedure Component Value Units Date/Time    E. coli 0157 antigen [[id number]] Collected:  02/23/18 2235    Order Status:  Completed  Specimen:  Stool from Stool Updated:  02/25/18 1947     Shiga Toxin 1 E.coli Negative     Shiga Toxin 2 E.coli Negative    Urine Culture [[id number]] Collected:  02/23/18 2230    Order Status:  Completed Specimen:  Urine from Urine, Clean Catch Updated:  02/25/18 1318     Urine Culture, Routine No growth    Stool culture [[id number]] Collected:  02/23/18 2235    Order Status:  Completed Specimen:  Stool from Stool Updated:  02/25/18 1025     Stool Culture Culture in progress    Clostridium difficile EIA [[id number]] Collected:  02/23/18 2235    Order Status:  Completed Specimen:  Stool from Stool Updated:  02/24/18 0242     C. diff Antigen Negative     C difficile Toxins A+B, EIA Negative     Comment: Testing not recommended for children <24 months old.            CT abdomen and pelvis:  1.  Descending and sigmoid colonic diverticulosis without evidence for diverticulitis.  2. Additional findings:  -Cholelithiasis. There is also a borderline dilated common bile duct for age (measuring up to 9 mm in diameter) to the level of the ampulla without identifiable obstructive etiology by CT. Correlation for potentially related symptoms and laboratory abnormalities is recommended.  -Bilateral, nonobstructive lower pole nephrolithiasis.    Bilateral hip x-ray:  No acute radiographic findings to explain this patient's hip pain. Mild, symmetric bilateral SI joint DJD is noted.    Assessment/Plan:      *Lower abdominal pain [R10.30]   Yes    Diverticulitis [K57.92]  On IV Zosyn and Falgyl.  Obtain Colonoscopy results from Dr. Valera.   Supportive care with PO narcotics and anti-emetics prn.      Yes    Hypertension [I10]  Anti-HTN medications are on hold      Yes    Hypercalcemia [E83.52] - possibly primary hyperparathyroidism  Continue IVF hydration. Serum Ca better. Off HCTZ.  May need outpatient Sistamibi scan. Per patient not a new problem.    UTI  Urine Cx NTD. Continue IV antibiotics.    Possible GI bleeding  GI  specialist to evaluate the patient.    CBD dilatation s/p cholecystectomy  No RUQ pain, LFTs WNL. Order MRCP and follow GI specialist opinion.    Lower back pain/ left sided hip pain  - Hip X-rays without acute process.  - follow up MRI lower back as outpatent   Yes         DVT prophylaxis: Use SCd and TEDs. Anticoagulation is hold due to possible GI bleeding.          VTE Risk Mitigation         Ordered     Medium Risk of VTE  Once      02/23/18 1302        Zac Novak MD  Department of Hospital Medicine   [facility]

## 2018-02-26 NOTE — CONSULTS
[facility]     CC: Abdominal pain    HPI 62 y.o. female with abdominal pain, onset a couple of weeks ago, left flank and LLQ, cramping, with some associated loose nonbloody stool, with no alleviating/exacerbating factors.  She follows with Dr. Roa and had colonoscopy in the last couple of months with multiple polyps noted.  She denies bleeding and FOBT was negative.  She has known diverticulitis and has been on antibiotics for that.  She also has chronic LBP.  She now has some mild CBD dilatation without stone noted in the duct on CT and with normal labs.  She denies any other complaints.  Notes from Dr. Novak reviewed and significant for anemia with no overt GI bleeding.    Past Medical History:   Diagnosis Date    Allergy     Arthritis     Asthma     Coronary artery disease     Hypertension     Hyperthyroidism     Lumbar radiculitis 1/30/2018       Past Surgical History:   Procedure Laterality Date    APPENDECTOMY      CHOLECYSTECTOMY      SPINE SURGERY      TONSILLECTOMY         Social History   Substance Use Topics    Smoking status: Current Every Day Smoker    Smokeless tobacco: Current User    Alcohol use 0.6 oz/week     1 Glasses of wine per week       History reviewed. No pertinent family history.    Review of Systems  General ROS: negative for - chills, fever or weight loss  Psychological ROS: negative for - hallucination, depression or suicidal ideation  Ophthalmic ROS: negative for - blurry vision, photophobia or eye pain  ENT ROS: negative for - epistaxis, sore throat or rhinorrhea  Respiratory ROS: no cough, shortness of breath, or wheezing  Cardiovascular ROS: no chest pain or dyspnea on exertion  Gastrointestinal ROS: + abdominal pain and N/V  Genito-Urinary ROS: no dysuria, trouble voiding, or hematuria  Musculoskeletal ROS: negative for - arthralgia, myalgia, weakness  Neurological ROS: no syncope or seizures; no ataxia  Dermatological ROS: negative for pruritis, rash  "and jaundice    Physical Examination  BP (!) 114/58   Pulse 69   Temp 98.3 °F (36.8 °C)   Resp 18   Ht 5' 3" (1.6 m)   Wt 86 kg (189 lb 11.2 oz)   SpO2 95%   Breastfeeding? No   BMI 33.60 kg/m²   General appearance: alert, cooperative, no distress  HENT: Normocephalic, atraumatic, neck symmetrical, no nasal discharge   Eyes: conjunctivae/corneas clear, PERRL, EOM's intact, sclera anicteric  Lungs: clear to auscultation bilaterally, no dullness to percussion bilaterally, symmetric expansion, breathing unlabored  Heart: regular rate and rhythm without rub; no displacement of the PMI   Abdomen: soft with mild TTP on left, + BS  Extremities: extremities symmetric; no clubbing, cyanosis, or edema  Integument: Skin color, texture, turgor normal; no rashes; hair distrubution normal, no jaundice  Neurologic: Alert and oriented X 3, no focal sensory or motor neurologic deficits  Psychiatric: no pressured speech; normal affect; no evidence of impaired cognition, no anxiety/depression     Labs:  Lab Results   Component Value Date    WBC 5.70 02/26/2018    HGB 10.1 (L) 02/26/2018    HCT 30.8 (L) 02/26/2018    MCV 95 02/26/2018     02/26/2018       CMP  Sodium   Date Value Ref Range Status   02/26/2018 143 136 - 145 mmol/L Final   02/26/2018 143 136 - 145 mmol/L Final     Potassium   Date Value Ref Range Status   02/26/2018 3.8 3.5 - 5.1 mmol/L Final   02/26/2018 3.8 3.5 - 5.1 mmol/L Final     Chloride   Date Value Ref Range Status   02/26/2018 114 (H) 95 - 110 mmol/L Final   02/26/2018 114 (H) 95 - 110 mmol/L Final     CO2   Date Value Ref Range Status   02/26/2018 24 23 - 29 mmol/L Final   02/26/2018 24 23 - 29 mmol/L Final     Glucose   Date Value Ref Range Status   02/26/2018 96 70 - 110 mg/dL Final   02/26/2018 96 70 - 110 mg/dL Final     BUN, Bld   Date Value Ref Range Status   02/26/2018 27 (H) 8 - 23 mg/dL Final   02/26/2018 27 (H) 8 - 23 mg/dL Final     Creatinine   Date Value Ref Range Status "   02/26/2018 1.0 0.5 - 1.4 mg/dL Final   02/26/2018 1.0 0.5 - 1.4 mg/dL Final     Calcium   Date Value Ref Range Status   02/26/2018 9.5 8.7 - 10.5 mg/dL Final   02/26/2018 9.5 8.7 - 10.5 mg/dL Final     Total Protein   Date Value Ref Range Status   02/26/2018 5.0 (L) 6.0 - 8.4 g/dL Final     Albumin   Date Value Ref Range Status   02/26/2018 3.0 (L) 3.5 - 5.2 g/dL Final     Total Bilirubin   Date Value Ref Range Status   02/26/2018 0.5 0.1 - 1.0 mg/dL Final     Comment:     For infants and newborns, interpretation of results should be based  on gestational age, weight and in agreement with clinical  observations.  Premature Infant recommended reference ranges:  Up to 24 hours.............<8.0 mg/dL  Up to 48 hours............<12.0 mg/dL  3-5 days..................<15.0 mg/dL  6-29 days.................<15.0 mg/dL       Alkaline Phosphatase   Date Value Ref Range Status   02/26/2018 39 (L) 55 - 135 U/L Final     AST   Date Value Ref Range Status   02/26/2018 17 10 - 40 U/L Final     ALT   Date Value Ref Range Status   02/26/2018 20 10 - 44 U/L Final     Anion Gap   Date Value Ref Range Status   02/26/2018 5 (L) 8 - 16 mmol/L Final   02/26/2018 5 (L) 8 - 16 mmol/L Final     eGFR if    Date Value Ref Range Status   02/26/2018 >60 >60 mL/min/1.73 m^2 Final   02/26/2018 >60 >60 mL/min/1.73 m^2 Final     eGFR if non    Date Value Ref Range Status   02/26/2018 >60 >60 mL/min/1.73 m^2 Final     Comment:     Calculation used to obtain the estimated glomerular filtration  rate (eGFR) is the CKD-EPI equation.      02/26/2018 >60 >60 mL/min/1.73 m^2 Final     Comment:     Calculation used to obtain the estimated glomerular filtration  rate (eGFR) is the CKD-EPI equation.              Imaging:  CT scan was independently visualized and reviewed by me and showed findings as above in HPI.    I have personally reviewed these images    Old records from Dr. Novak reviewed and are as summarized above  in the HPI.      Assessment:   1.  LLQ pain  2.  Left flank pain  3.  UTI  4.  Chronic LBP  5.  Recent diverticulitis on antibiotics  6.  Mild dilation of CBD with no stone noted in duct  7.  Cholelithiasis with normal LFTs    Plan:  1.  Check MRCP to evaluate CBD  2.  Trend CMP  3.  No evidence of GI bleeding and iron studies consistent with anemia of chronic disease.  No scope indicated at this time.  4.  Continue antibiotics for above  5.  Symptomatic control for nausea.  Avoid narcotics as they will adversely affect GI motility.  6.  Further recommendations to follow after above.  7.  Communication will be sent to the referring MD, Dr. Novak regarding my assessment and plan on this patient via EPIC.      Tin Moe MD  [facility]  [facility] [facility], [address]  [place] [address]  Office: [phone]  Fax: [phone]

## 2018-02-26 NOTE — PLAN OF CARE
"Problem: Patient Care Overview  Goal: Plan of Care Review  Outcome: Ongoing (interventions implemented as appropriate)  Pt c/o slight RLQ, right groin and back pain releived with current regimen, pt states "i feel much better than I did,"pt is ambulating at abby to bathroom with contact guard assist, maintaining sinus rhythm on telemetry, pt states "my bowels not loose just really soft," maintaining sinus rhythm on telemetry, will continue to monitor, observe and note any changes, remains afebrile, see flowsheet, safety maintain      "

## 2018-02-26 NOTE — PLAN OF CARE
Problem: Patient Care Overview  Goal: Plan of Care Review  Outcome: Ongoing (interventions implemented as appropriate)  Patient aao x 4. Complains of L hip/groin and back pain 10 out of 10. Moderate relief with oral medication. Plan of care reviewed with patient, verbalized understanding. Blood pressure monitored, within  desired limit today. IV antibiotics given. IV fluids maintained. GI consulted. Ambulates with standby assist to restroom. Remained free from fall and injury. Bed in lowest position and call light within reach.

## 2018-02-26 NOTE — PT/OT/SLP EVAL
"Physical Therapy Evaluation    Patient Name:  Nancy Muñoz   MRN:  [id number]    Recommendations:     Discharge Recommendations:  outpatient PT, home   Discharge Equipment Recommendations: none   Barriers to discharge: None    Assessment:     Nancy Muñoz is a 62 y.o. female admitted with a medical diagnosis of Lower abdominal pain.  She presents with the following impairments/functional limitations:  impaired functional mobilty, pain, decreased lower extremity function. Pt ambulated within the room but had to stop due to radicular pain into anterior L thigh. Pt was able to tolerate standing and sitting in a flexed posture to relieve pain symptoms. Pt would benefit from OP PT consult at discharge to further address low back pain.     Rehab Prognosis:  good; patient would benefit from acute skilled PT services to address these deficits and reach maximum level of function.      Recent Surgery: * No surgery found *      Plan:     During this hospitalization, patient to be seen 4 x/week to address the above listed problems via gait training, therapeutic activities, therapeutic exercises  · Plan of Care Expires:   3/25/2018   Plan of Care Reviewed with: patient    Subjective     Communicated with nurse prior to session.  Patient found supine upon PT entry to room, agreeable to evaluation.      Chief Complaint: Pain in her L hip, groin, and thigh  Patient comments/goals: "I want to be active again and get back to walking, dancing, and hunting."  Pain/Comfort:  · Pain Rating 1: 7/10  · Location - Side 1: Left  · Location 1: hip  · Pain Addressed 1: Pre-medicate for activity, Reposition, Cessation of Activity  · Pain Rating Post-Intervention 1: 4/10  · Pain Rating 2: 7/10  · Location - Side 2: Left  · Location 2: thigh  · Pain Addressed 2: Pre-medicate for activity, Reposition, Cessation of Activity  · Pain Rating Post-Intervention 2: 4/10   · Pt described a shooting pain in the L hip, groin, and thigh when taking a step. " Pain improved with trunk flexion in standing and sitting. Pt unable to achieve neutral standing posture due to pain with movement toward trunk extension.     Patients cultural, spiritual, Pentecostalism conflicts given the current situation:  None    Living Environment:  Pt lives in a 2 story house with her . There is a bedroom on the first floor that she can use. Her  takes care of the household chores now that he is retired.  Prior to admission, patients level of function was independent.  Patient has the following equipment: none.    Pt was previously driving.   Pt is a 7th .  Upon discharge, patient will have assistance from her .    Objective:     Patient found with:  (no lines)     General Precautions: Standard, fall   Orthopedic Precautions:N/A   Braces: N/A     Exams:  · Gross Motor Coordination:  WFL  · Postural Exam:  Patient presented with the following abnormalities:    · -       Forward flexed posture while walking to decrease pain in the L groin and L hip  · Sensation:    · -       Intact  · RUE ROM: WFL  · RUE Strength: WFL  · LUE ROM: WFL  · LUE Strength: WFL  · RLE ROM: WFL  · RLE Strength: WFL    Functional Mobility:  · Bed Mobility:   · Rolling Right: supervision with some VC to use UE for assistance while rolling  · Supine to Sit: contact guard assistance x 1  · Gait:  · Pt ambulated with no AD for 30ft.   · Ambulation was discontinued due to increased pain of the L hip, groin, and thigh. Pt pain increased to a 10/10 while walking.  · Pt maintained a flexed posture and held L thigh to decrease the pain. Flexion progressed throughout walk.   · Transfers  · Stand to sit in the chair: supervision with some VC to reach back for the armrests of the chair.      AM-PAC 6 CLICK MOBILITY  Total Score:16       Therapeutic Activities and Exercises:  Pt was instructed to perform trunk flexion to decrease pain while standing.  Pt was educated on intermittently performing  trunk flexion while sitting in order to decrease pain symptoms.    Patient left up in chair with call button in reach.    GOALS:    Physical Therapy Goals        Problem: Physical Therapy Goal    Goal Priority Disciplines Outcome Goal Variances Interventions   Physical Therapy Goal     PT/OT, PT Ongoing (interventions implemented as appropriate)     Description:  Goals to be met by: 3-5-2018     Patient will increase functional independence with mobility by performin. Gait  x 150 feet with Stand-by Assistance.  2. Sitting at edge of bed x 5 minutes with a pain level less than 6/10.  3.  a neutral spine posture while performing a functional task with a pain level less than 6/10.  4. Independent with positioning to relieve low back pain.                    History:     Past Medical History:   Diagnosis Date    Allergy     Arthritis     Asthma     Coronary artery disease     Hypertension     Hyperthyroidism     Lumbar radiculitis 2018       Past Surgical History:   Procedure Laterality Date    APPENDECTOMY      CHOLECYSTECTOMY      SPINE SURGERY      TONSILLECTOMY         Clinical Decision Making:     History  Co-morbidities and personal factors that may impact the plan of care Examination  Body Structures and Functions, activity limitations and participation restrictions that may impact the plan of care Clinical Presentation   Decision Making/ Complexity Score   Co-morbidities:   [] Time since onset of injury / illness / exacerbation  [x] Status of current condition  []Patient's cognitive status and safety concerns    [x] Multiple Medical Problems (see med hx)  Personal Factors:   [] Patient's age  [] Prior Level of function   [] Patient's home situation (environment and family support)  [] Patient's level of motivation  [] Expected progression of patient      HISTORY:(criteria)    [] 11493 - no personal factors/history    [x] 03118 - has 1-2 personal factor/comorbidity     [] 61214 -  has >3 personal factor/comorbidity     Body Regions:  [] Objective examination findings  [] Head     []  Neck  [] Trunk   [] Upper Extremity  [x] Lower Extremity    Body Systems:  [] For communication ability, affect, cognition, language, and learning style: the assessment of the ability to make needs known, consciousness, orientation (person, place, and time), expected emotional /behavioral responses, and learning preferences (eg, learning barriers, education  needs)  [x] For the neuromuscular system: a general assessment of gross coordinated movement (eg, balance, gait, locomotion, transfers, and transitions) and motor function  (motor control and motor learning)  [x] For the musculoskeletal system: the assessment of gross symmetry, gross range of motion, gross strength, height, and weight  [] For the integumentary system: the assessment of pliability(texture), presence of scar formation, skin color, and skin integrity  [] For cardiovascular/pulmonary system: the assessment of heart rate, respiratory rate, blood pressure, and edema     Activity limitations:    [] Patient's cognitive status and saf ety concerns          [x] Status of current condition      [] Weight bearing restriction  [] Cardiopulmunary Restriction    Participation Restrictions:   [] Goals and goal agreement with the patient     [] Rehab potential (prognosis) and probable outcome      Examination of Body System: (criteria)    [] 71620 - addressing 1-2 elements    [] 86937 - addressing a total of 3 or more elements     [x] 76739 -  Addressing a total of 4 or more elements         Clinical Presentation: (criteria)  Evolving - 34789     On examination of body system using standardized tests and measures patient presents with 4 or more elements from any of the following: body structures and functions, activity limitations, and/or participation restrictions.  Leading to a clinical presentation that is considered evolving with changing  characteristics                              Clinical Decision Making  (Eval Complexity):  Moderate - 85658     Time Tracking:     PT Received On: 02/26/18  PT Start Time: 1004     PT Stop Time: 1027  PT Total Time (min): 23 min     Billable Minutes: Evaluation 15 and Therapeutic Activity 8      Jessica LeJeune, PT, DPT

## 2018-02-26 NOTE — PLAN OF CARE
Problem: Physical Therapy Goal  Goal: Physical Therapy Goal  Goals to be met by: 3-5-2018     Patient will increase functional independence with mobility by performin. Gait  x 150 feet with Stand-by Assistance.  2. Sitting at edge of bed x 5 minutes with a pain level less than 6/10.  3.  a neutral spine posture while performing a functional task with a pain level less than 6/10.  4. Independent with positioning to relieve low back pain.  Outcome: Ongoing (interventions implemented as appropriate)  PT evaluation and treatment complete. Outpatient therapy is recommended upon discharge.

## 2018-02-27 ENCOUNTER — ANESTHESIA (OUTPATIENT)
Dept: SURGERY | Facility: HOSPITAL | Age: 63
DRG: 357 | End: 2018-02-27
Payer: COMMERCIAL

## 2018-02-27 ENCOUNTER — SURGERY (OUTPATIENT)
Age: 63
End: 2018-02-27

## 2018-02-27 ENCOUNTER — ANESTHESIA EVENT (OUTPATIENT)
Dept: SURGERY | Facility: HOSPITAL | Age: 63
DRG: 357 | End: 2018-02-27
Payer: COMMERCIAL

## 2018-02-27 PROBLEM — K81.9 CHOLECYSTITIS: Status: ACTIVE | Noted: 2018-02-27

## 2018-02-27 LAB
1,25(OH)2D3 SERPL-MCNC: 49 PG/ML
ALBUMIN SERPL BCP-MCNC: 2.9 G/DL
ALP SERPL-CCNC: 38 U/L
ALT SERPL W/O P-5'-P-CCNC: 33 U/L
ANION GAP SERPL CALC-SCNC: 7 MMOL/L
ANION GAP SERPL CALC-SCNC: 7 MMOL/L
AST SERPL-CCNC: 35 U/L
BACTERIA STL CULT: NORMAL
BASOPHILS # BLD AUTO: 0 K/UL
BASOPHILS NFR BLD: 0.4 %
BILIRUB SERPL-MCNC: 0.6 MG/DL
BUN SERPL-MCNC: 20 MG/DL
BUN SERPL-MCNC: 20 MG/DL
CALCIUM SERPL-MCNC: 9.2 MG/DL
CALCIUM SERPL-MCNC: 9.2 MG/DL
CHLORIDE SERPL-SCNC: 115 MMOL/L
CHLORIDE SERPL-SCNC: 115 MMOL/L
CO2 SERPL-SCNC: 23 MMOL/L
CO2 SERPL-SCNC: 23 MMOL/L
CREAT SERPL-MCNC: 0.9 MG/DL
CREAT SERPL-MCNC: 0.9 MG/DL
DIFFERENTIAL METHOD: ABNORMAL
EOSINOPHIL # BLD AUTO: 0.1 K/UL
EOSINOPHIL NFR BLD: 2 %
ERYTHROCYTE [DISTWIDTH] IN BLOOD BY AUTOMATED COUNT: 12.7 %
EST. GFR  (AFRICAN AMERICAN): >60 ML/MIN/1.73 M^2
EST. GFR  (AFRICAN AMERICAN): >60 ML/MIN/1.73 M^2
EST. GFR  (NON AFRICAN AMERICAN): >60 ML/MIN/1.73 M^2
EST. GFR  (NON AFRICAN AMERICAN): >60 ML/MIN/1.73 M^2
GLUCOSE SERPL-MCNC: 92 MG/DL
GLUCOSE SERPL-MCNC: 92 MG/DL
HCT VFR BLD AUTO: 30 %
HGB BLD-MCNC: 9.9 G/DL
LYMPHOCYTES # BLD AUTO: 1.9 K/UL
LYMPHOCYTES NFR BLD: 31.9 %
MCH RBC QN AUTO: 31.2 PG
MCHC RBC AUTO-ENTMCNC: 33.2 G/DL
MCV RBC AUTO: 94 FL
MONOCYTES # BLD AUTO: 0.8 K/UL
MONOCYTES NFR BLD: 12.9 %
NEUTROPHILS # BLD AUTO: 3.1 K/UL
NEUTROPHILS NFR BLD: 52.8 %
PLATELET # BLD AUTO: 161 K/UL
PMV BLD AUTO: 8.1 FL
POTASSIUM SERPL-SCNC: 3.9 MMOL/L
POTASSIUM SERPL-SCNC: 3.9 MMOL/L
PROT SERPL-MCNC: 4.9 G/DL
RBC # BLD AUTO: 3.19 M/UL
SODIUM SERPL-SCNC: 145 MMOL/L
SODIUM SERPL-SCNC: 145 MMOL/L
WBC # BLD AUTO: 5.9 K/UL

## 2018-02-27 PROCEDURE — D9220A PRA ANESTHESIA: Mod: ANES,,, | Performed by: ANESTHESIOLOGY

## 2018-02-27 PROCEDURE — 36415 COLL VENOUS BLD VENIPUNCTURE: CPT

## 2018-02-27 PROCEDURE — 25000003 PHARM REV CODE 250: Performed by: NURSE ANESTHETIST, CERTIFIED REGISTERED

## 2018-02-27 PROCEDURE — 99232 SBSQ HOSP IP/OBS MODERATE 35: CPT | Mod: ,,, | Performed by: INTERNAL MEDICINE

## 2018-02-27 PROCEDURE — 25000003 PHARM REV CODE 250: Performed by: ANESTHESIOLOGY

## 2018-02-27 PROCEDURE — 85025 COMPLETE CBC W/AUTO DIFF WBC: CPT

## 2018-02-27 PROCEDURE — 71000033 HC RECOVERY, INTIAL HOUR: Performed by: SURGERY

## 2018-02-27 PROCEDURE — 99254 IP/OBS CNSLTJ NEW/EST MOD 60: CPT | Mod: ,,, | Performed by: SURGERY

## 2018-02-27 PROCEDURE — S0030 INJECTION, METRONIDAZOLE: HCPCS | Performed by: INTERNAL MEDICINE

## 2018-02-27 PROCEDURE — 99900103 DSU ONLY-NO CHARGE-INITIAL HR (STAT): Performed by: SURGERY

## 2018-02-27 PROCEDURE — 99900104 DSU ONLY-NO CHARGE-EA ADD'L HR (STAT): Performed by: SURGERY

## 2018-02-27 PROCEDURE — 12000002 HC ACUTE/MED SURGE SEMI-PRIVATE ROOM

## 2018-02-27 PROCEDURE — 80053 COMPREHEN METABOLIC PANEL: CPT

## 2018-02-27 PROCEDURE — 71000039 HC RECOVERY, EACH ADD'L HOUR: Performed by: SURGERY

## 2018-02-27 PROCEDURE — 47562 LAPAROSCOPIC CHOLECYSTECTOMY: CPT | Mod: ,,, | Performed by: SURGERY

## 2018-02-27 PROCEDURE — 25000003 PHARM REV CODE 250: Performed by: INTERNAL MEDICINE

## 2018-02-27 PROCEDURE — 25000003 PHARM REV CODE 250: Performed by: NURSE PRACTITIONER

## 2018-02-27 PROCEDURE — 25000003 PHARM REV CODE 250: Performed by: FAMILY MEDICINE

## 2018-02-27 PROCEDURE — 88304 TISSUE EXAM BY PATHOLOGIST: CPT | Performed by: PATHOLOGY

## 2018-02-27 PROCEDURE — 63600175 PHARM REV CODE 636 W HCPCS: Performed by: NURSE ANESTHETIST, CERTIFIED REGISTERED

## 2018-02-27 PROCEDURE — 27201423 OPTIME MED/SURG SUP & DEVICES STERILE SUPPLY: Performed by: SURGERY

## 2018-02-27 PROCEDURE — 25000003 PHARM REV CODE 250: Performed by: SURGERY

## 2018-02-27 PROCEDURE — 36000708 HC OR TIME LEV III 1ST 15 MIN: Performed by: SURGERY

## 2018-02-27 PROCEDURE — 0FT44ZZ RESECTION OF GALLBLADDER, PERCUTANEOUS ENDOSCOPIC APPROACH: ICD-10-PCS | Performed by: SURGERY

## 2018-02-27 PROCEDURE — 37000009 HC ANESTHESIA EA ADD 15 MINS: Performed by: SURGERY

## 2018-02-27 PROCEDURE — 36000709 HC OR TIME LEV III EA ADD 15 MIN: Performed by: SURGERY

## 2018-02-27 PROCEDURE — 37000008 HC ANESTHESIA 1ST 15 MINUTES: Performed by: SURGERY

## 2018-02-27 PROCEDURE — D9220A PRA ANESTHESIA: Mod: CRNA,,, | Performed by: NURSE ANESTHETIST, CERTIFIED REGISTERED

## 2018-02-27 PROCEDURE — 63600175 PHARM REV CODE 636 W HCPCS: Performed by: INTERNAL MEDICINE

## 2018-02-27 PROCEDURE — 99900035 HC TECH TIME PER 15 MIN (STAT)

## 2018-02-27 RX ORDER — GLYCOPYRROLATE 0.2 MG/ML
INJECTION INTRAMUSCULAR; INTRAVENOUS
Status: DISCONTINUED | OUTPATIENT
Start: 2018-02-27 | End: 2018-02-27

## 2018-02-27 RX ORDER — DEXAMETHASONE SODIUM PHOSPHATE 4 MG/ML
INJECTION, SOLUTION INTRA-ARTICULAR; INTRALESIONAL; INTRAMUSCULAR; INTRAVENOUS; SOFT TISSUE
Status: DISCONTINUED | OUTPATIENT
Start: 2018-02-27 | End: 2018-02-27

## 2018-02-27 RX ORDER — NEOSTIGMINE METHYLSULFATE 1 MG/ML
INJECTION, SOLUTION INTRAVENOUS
Status: DISCONTINUED | OUTPATIENT
Start: 2018-02-27 | End: 2018-02-27

## 2018-02-27 RX ORDER — MIDAZOLAM HYDROCHLORIDE 1 MG/ML
INJECTION, SOLUTION INTRAMUSCULAR; INTRAVENOUS
Status: DISCONTINUED | OUTPATIENT
Start: 2018-02-27 | End: 2018-02-27

## 2018-02-27 RX ORDER — BUPIVACAINE HYDROCHLORIDE AND EPINEPHRINE 2.5; 5 MG/ML; UG/ML
INJECTION, SOLUTION EPIDURAL; INFILTRATION; INTRACAUDAL; PERINEURAL
Status: DISCONTINUED | OUTPATIENT
Start: 2018-02-27 | End: 2018-02-27 | Stop reason: HOSPADM

## 2018-02-27 RX ORDER — HYDROMORPHONE HYDROCHLORIDE 2 MG/ML
0.2 INJECTION, SOLUTION INTRAMUSCULAR; INTRAVENOUS; SUBCUTANEOUS EVERY 5 MIN PRN
Status: DISCONTINUED | OUTPATIENT
Start: 2018-02-27 | End: 2018-02-27

## 2018-02-27 RX ORDER — SODIUM CHLORIDE, SODIUM LACTATE, POTASSIUM CHLORIDE, CALCIUM CHLORIDE 600; 310; 30; 20 MG/100ML; MG/100ML; MG/100ML; MG/100ML
INJECTION, SOLUTION INTRAVENOUS CONTINUOUS
Status: DISCONTINUED | OUTPATIENT
Start: 2018-02-27 | End: 2018-02-27

## 2018-02-27 RX ORDER — DIPHENHYDRAMINE HYDROCHLORIDE 50 MG/ML
25 INJECTION INTRAMUSCULAR; INTRAVENOUS EVERY 6 HOURS PRN
Status: DISCONTINUED | OUTPATIENT
Start: 2018-02-27 | End: 2018-02-27

## 2018-02-27 RX ORDER — FENTANYL CITRATE 50 UG/ML
25 INJECTION, SOLUTION INTRAMUSCULAR; INTRAVENOUS EVERY 5 MIN PRN
Status: DISCONTINUED | OUTPATIENT
Start: 2018-02-27 | End: 2018-02-27

## 2018-02-27 RX ORDER — ONDANSETRON 2 MG/ML
4 INJECTION INTRAMUSCULAR; INTRAVENOUS ONCE
Status: DISCONTINUED | OUTPATIENT
Start: 2018-02-27 | End: 2018-02-27

## 2018-02-27 RX ORDER — PROPOFOL 10 MG/ML
VIAL (ML) INTRAVENOUS
Status: DISCONTINUED | OUTPATIENT
Start: 2018-02-27 | End: 2018-02-27

## 2018-02-27 RX ORDER — OXYCODONE AND ACETAMINOPHEN 5; 325 MG/1; MG/1
1 TABLET ORAL EVERY 6 HOURS PRN
Status: DISCONTINUED | OUTPATIENT
Start: 2018-02-27 | End: 2018-02-28 | Stop reason: HOSPADM

## 2018-02-27 RX ORDER — MEPERIDINE HYDROCHLORIDE 25 MG/ML
12.5 INJECTION INTRAMUSCULAR; INTRAVENOUS; SUBCUTANEOUS ONCE AS NEEDED
Status: DISCONTINUED | OUTPATIENT
Start: 2018-02-27 | End: 2018-02-27

## 2018-02-27 RX ORDER — SODIUM CHLORIDE, SODIUM LACTATE, POTASSIUM CHLORIDE, CALCIUM CHLORIDE 600; 310; 30; 20 MG/100ML; MG/100ML; MG/100ML; MG/100ML
75 INJECTION, SOLUTION INTRAVENOUS CONTINUOUS
Status: DISCONTINUED | OUTPATIENT
Start: 2018-02-27 | End: 2018-02-27

## 2018-02-27 RX ORDER — SODIUM CHLORIDE 0.9 % (FLUSH) 0.9 %
3 SYRINGE (ML) INJECTION
Status: DISCONTINUED | OUTPATIENT
Start: 2018-02-27 | End: 2018-02-27

## 2018-02-27 RX ORDER — OXYCODONE HYDROCHLORIDE 5 MG/1
5 TABLET ORAL ONCE AS NEEDED
Status: DISCONTINUED | OUTPATIENT
Start: 2018-02-28 | End: 2018-02-27

## 2018-02-27 RX ORDER — ONDANSETRON HYDROCHLORIDE 2 MG/ML
INJECTION, SOLUTION INTRAMUSCULAR; INTRAVENOUS
Status: DISCONTINUED | OUTPATIENT
Start: 2018-02-27 | End: 2018-02-27

## 2018-02-27 RX ORDER — SUCCINYLCHOLINE CHLORIDE 20 MG/ML
INJECTION INTRAMUSCULAR; INTRAVENOUS
Status: DISCONTINUED | OUTPATIENT
Start: 2018-02-27 | End: 2018-02-27

## 2018-02-27 RX ORDER — KETOROLAC TROMETHAMINE 30 MG/ML
INJECTION, SOLUTION INTRAMUSCULAR; INTRAVENOUS
Status: DISCONTINUED | OUTPATIENT
Start: 2018-02-27 | End: 2018-02-27

## 2018-02-27 RX ORDER — LIDOCAINE HCL/PF 100 MG/5ML
SYRINGE (ML) INTRAVENOUS
Status: DISCONTINUED | OUTPATIENT
Start: 2018-02-27 | End: 2018-02-27

## 2018-02-27 RX ORDER — ROCURONIUM BROMIDE 10 MG/ML
INJECTION, SOLUTION INTRAVENOUS
Status: DISCONTINUED | OUTPATIENT
Start: 2018-02-27 | End: 2018-02-27

## 2018-02-27 RX ORDER — FENTANYL CITRATE 50 UG/ML
INJECTION, SOLUTION INTRAMUSCULAR; INTRAVENOUS
Status: DISCONTINUED | OUTPATIENT
Start: 2018-02-27 | End: 2018-02-27

## 2018-02-27 RX ADMIN — METRONIDAZOLE 500 MG: 500 INJECTION, SOLUTION INTRAVENOUS at 05:02

## 2018-02-27 RX ADMIN — KETOROLAC TROMETHAMINE 30 MG: 30 INJECTION, SOLUTION INTRAMUSCULAR; INTRAVENOUS at 04:02

## 2018-02-27 RX ADMIN — OXYCODONE AND ACETAMINOPHEN 1 TABLET: 5; 325 TABLET ORAL at 05:02

## 2018-02-27 RX ADMIN — BUPIVACAINE HYDROCHLORIDE AND EPINEPHRINE BITARTRATE 30 ML: 2.5; .0091 INJECTION, SOLUTION EPIDURAL; INFILTRATION; INTRACAUDAL; PERINEURAL at 03:02

## 2018-02-27 RX ADMIN — DEXAMETHASONE SODIUM PHOSPHATE 4 MG: 4 INJECTION, SOLUTION INTRAMUSCULAR; INTRAVENOUS at 03:02

## 2018-02-27 RX ADMIN — METRONIDAZOLE 500 MG: 500 INJECTION, SOLUTION INTRAVENOUS at 11:02

## 2018-02-27 RX ADMIN — SUCCINYLCHOLINE CHLORIDE 120 MG: 20 INJECTION, SOLUTION INTRAMUSCULAR; INTRAVENOUS at 03:02

## 2018-02-27 RX ADMIN — RAMELTEON 8 MG: 8 TABLET, FILM COATED ORAL at 11:02

## 2018-02-27 RX ADMIN — METRONIDAZOLE 500 MG: 500 INJECTION, SOLUTION INTRAVENOUS at 03:02

## 2018-02-27 RX ADMIN — SODIUM CHLORIDE, SODIUM LACTATE, POTASSIUM CHLORIDE, AND CALCIUM CHLORIDE: .6; .31; .03; .02 INJECTION, SOLUTION INTRAVENOUS at 03:02

## 2018-02-27 RX ADMIN — PANTOPRAZOLE SODIUM 40 MG: 40 TABLET, DELAYED RELEASE ORAL at 08:02

## 2018-02-27 RX ADMIN — PIPERACILLIN AND TAZOBACTAM 4.5 G: 4; .5 INJECTION, POWDER, LYOPHILIZED, FOR SOLUTION INTRAVENOUS; PARENTERAL at 11:02

## 2018-02-27 RX ADMIN — GLYCOPYRROLATE 0.4 MG: 0.2 INJECTION, SOLUTION INTRAMUSCULAR; INTRAVENOUS at 04:02

## 2018-02-27 RX ADMIN — PIPERACILLIN AND TAZOBACTAM 4.5 G: 4; .5 INJECTION, POWDER, LYOPHILIZED, FOR SOLUTION INTRAVENOUS; PARENTERAL at 03:02

## 2018-02-27 RX ADMIN — ROCURONIUM BROMIDE 10 MG: 10 INJECTION, SOLUTION INTRAVENOUS at 03:02

## 2018-02-27 RX ADMIN — PIPERACILLIN AND TAZOBACTAM 4.5 G: 4; .5 INJECTION, POWDER, LYOPHILIZED, FOR SOLUTION INTRAVENOUS; PARENTERAL at 05:02

## 2018-02-27 RX ADMIN — PROPOFOL 150 MG: 10 INJECTION, EMULSION INTRAVENOUS at 03:02

## 2018-02-27 RX ADMIN — GABAPENTIN 300 MG: 300 CAPSULE ORAL at 05:02

## 2018-02-27 RX ADMIN — ROCURONIUM BROMIDE 20 MG: 10 INJECTION, SOLUTION INTRAVENOUS at 04:02

## 2018-02-27 RX ADMIN — LIDOCAINE HYDROCHLORIDE 50 MG: 20 INJECTION, SOLUTION INTRAVENOUS at 03:02

## 2018-02-27 RX ADMIN — OXYCODONE HYDROCHLORIDE AND ACETAMINOPHEN 1 TABLET: 5; 325 TABLET ORAL at 08:02

## 2018-02-27 RX ADMIN — MIDAZOLAM 2 MG: 1 INJECTION INTRAMUSCULAR; INTRAVENOUS at 03:02

## 2018-02-27 RX ADMIN — ONDANSETRON 4 MG: 2 INJECTION, SOLUTION INTRAMUSCULAR; INTRAVENOUS at 03:02

## 2018-02-27 RX ADMIN — FENTANYL CITRATE 100 MCG: 50 INJECTION, SOLUTION INTRAMUSCULAR; INTRAVENOUS at 04:02

## 2018-02-27 RX ADMIN — GABAPENTIN 300 MG: 300 CAPSULE ORAL at 11:02

## 2018-02-27 RX ADMIN — SODIUM CHLORIDE: 0.9 INJECTION, SOLUTION INTRAVENOUS at 08:02

## 2018-02-27 RX ADMIN — OXYCODONE HYDROCHLORIDE AND ACETAMINOPHEN 1 TABLET: 5; 325 TABLET ORAL at 12:02

## 2018-02-27 RX ADMIN — NEOSTIGMINE METHYLSULFATE 3.5 MG: 1 INJECTION INTRAVENOUS at 04:02

## 2018-02-27 RX ADMIN — FENTANYL CITRATE 100 MCG: 50 INJECTION, SOLUTION INTRAMUSCULAR; INTRAVENOUS at 03:02

## 2018-02-27 NOTE — ANESTHESIA POSTPROCEDURE EVALUATION
"Anesthesia Post Evaluation    Patient: Nancy Muñoz    Procedure(s) Performed: Procedure(s) (LRB):  CHOLECYSTECTOMY-LAPAROSCOPIC (N/A)    Final Anesthesia Type: general  Patient location during evaluation: PACU  Patient participation: Yes- Able to Participate  Level of consciousness: awake and alert and oriented  Post-procedure vital signs: reviewed and stable  Pain management: adequate  Airway patency: patent  PONV status at discharge: No PONV  Anesthetic complications: no      Cardiovascular status: blood pressure returned to baseline  Respiratory status: unassisted, spontaneous ventilation and room air  Hydration status: euvolemic  Follow-up not needed.        Visit Vitals  BP (!) 142/78 (BP Location: Right arm, Patient Position: Lying)   Pulse 98   Temp 36.4 °C (97.5 °F) (Temporal)   Resp (!) 21   Ht 5' 3" (1.6 m)   Wt 67.6 kg (149 lb)   SpO2 (!) 90%   Breastfeeding? No   BMI 26.39 kg/m²       Pain/Ellyn Score: Pain Assessment Performed: Yes (2/27/2018  4:42 PM)  Presence of Pain: denies (2/27/2018  4:50 PM)  Pain Rating Prior to Med Admin: 8 (2/27/2018 12:52 PM)  Ellyn Score: 8 (2/27/2018  4:50 PM)      "

## 2018-02-27 NOTE — TRANSFER OF CARE
"Anesthesia Transfer of Care Note    Patient: Nancy Muñoz    Procedure(s) Performed: Procedure(s) (LRB):  CHOLECYSTECTOMY-LAPAROSCOPIC (N/A)    Patient location: PACU    Anesthesia Type: general    Transport from OR: Transported from OR on 2-3 L/min O2 by NC with adequate spontaneous ventilation    Post pain: adequate analgesia    Post assessment: no apparent anesthetic complications and tolerated procedure well    Post vital signs: stable    Level of consciousness: awake, alert and oriented    Nausea/Vomiting: no nausea/vomiting    Complications: none    Transfer of care protocol was followed      Last vitals:   Visit Vitals  BP (!) 143/77 (BP Location: Right arm, Patient Position: Lying)   Pulse 95   Temp 36.4 °C (97.5 °F) (Temporal)   Resp 18   Ht 5' 3" (1.6 m)   Wt 67.6 kg (149 lb)   SpO2 (!) 94%   Breastfeeding? No   BMI 26.39 kg/m²     "

## 2018-02-27 NOTE — PLAN OF CARE
Pt lying in bed awake. States pain tolerable, just a little burning. Family updated upon pt arrival to recovery room.

## 2018-02-27 NOTE — PROGRESS NOTES
"[facility]    CC: Abdominal pain    HPI 62 y.o. female with abdominal pain, onset a couple of weeks ago, left flank and LLQ, cramping, with some associated loose nonbloody stool, with no alleviating/exacerbating factors.  She follows with Dr. Roa and had colonoscopy in the last couple of months with multiple polyps noted.  She denies bleeding and FOBT was negative.  She has known diverticulitis and has been on antibiotics for that.  She also has chronic LBP.  She now has some mild CBD dilatation without stone noted in the duct on CT and with normal labs.  She denies any other complaints.  Notes from Dr. Novak reviewed and significant for anemia with no overt GI bleeding.    INTERVAL HISTORY:  Since last visit, patient has had mild ongoing abdominal pain but no emesis.  No bleeding.  MRCP reviewed independently and showed mildly prominent CBD with  No filling defect.  She had evidence of cholecystitis with large GB stones.  Case discussed at bedside with Dr. Novak.    Past Medical History:   Diagnosis Date    Allergy     Arthritis     Asthma     Coronary artery disease     Hypertension     Hyperthyroidism     Lumbar radiculitis 1/30/2018         Review of Systems  General ROS: negative for - chills, fever or weight loss  Cardiovascular ROS: no chest pain or dyspnea on exertion  Gastrointestinal ROS: + abdominal pain    Physical Examination  /69 (BP Location: Left arm, Patient Position: Lying)   Pulse 69   Temp 96.7 °F (35.9 °C) (Oral)   Resp 14   Ht 5' 3" (1.6 m)   Wt 86 kg (189 lb 11.2 oz)   SpO2 97%   Breastfeeding? No   BMI 33.60 kg/m²   General appearance: alert, cooperative, no distress  HENT: Normocephalic, atraumatic, neck symmetrical, no nasal discharge, sclera anicteric   Lungs: clear to auscultation bilaterally, symmetric chest wall expansion bilaterally  Heart: regular rate and rhythm without rub; no displacement of the PMI   Abdomen: + minimal " tenderness  Extremities: extremities symmetric; no clubbing, cyanosis, or edema  Neurologic: Alert and oriented X 3, no sensory or motor neurologic deficits      Labs:  Lab Results   Component Value Date    WBC 5.90 02/27/2018    HGB 9.9 (L) 02/27/2018    HCT 30.0 (L) 02/27/2018    MCV 94 02/27/2018     02/27/2018         Imaging:  MRCP was independently visualized and reviewed by me and showed findings as above in HPI.      Assessment:   1.  Cholecystitis  2.  Mildly prominent CBD with normal labs and no filling defect on MRCP  3.  Abdominal pain    Plan:  1.  Symptomatic management  2.  Surgical consultation for lap santino  3.  No indication for biliary endoscopy at this time given absence of filling defect and normal labs.  4.  GI to sign off.  Call for questions.    Tin Moe MD  [facility]  [address], [address]  [place] [address]  Office: [phone]  Fax: [phone]

## 2018-02-27 NOTE — PLAN OF CARE
The pt lives at home with her spuseChandra [phone]. She stated that he is authorized to make all decisions for her . She denies HH or DME. She uses [facility] on [address]. Verified PCP as Dr. Petersen and insurance as [employer]. I educated the pts on the blue discharge folder and wrote my name and number on the pts white board. Malu VY Leigh, [facility]     02/27/18 6633   Discharge Assessment   Assessment Type Discharge Planning Assessment   Confirmed/corrected address and phone number on facesheet? Yes   Assessment information obtained from? Patient   Communicated expected length of stay with patient/caregiver no   Prior to hospitilization cognitive status: Alert/Oriented   Prior to hospitalization functional status: Independent   Current cognitive status: Alert/Oriented   Current Functional Status: Independent   Lives With spouse   Able to Return to Prior Arrangements yes   Is patient able to care for self after discharge? Yes   Readmission Within The Last 30 Days no previous admission in last 30 days   Patient currently being followed by outpatient case management? No   Patient currently receives any other outside agency services? No   Equipment Currently Used at Home none   Do you have any problems affording any of your prescribed medications? No   Is the patient taking medications as prescribed? yes   Does the patient have transportation home? Yes   Transportation Available family or friend will provide   Does the patient receive services at the [facility]? No   Discharge Plan A Home   Patient/Family In Agreement With Plan yes

## 2018-02-27 NOTE — BRIEF OP NOTE
[facility]  Brief Operative Note    SUMMARY     Surgery Date: 2/27/2018     Surgeon(s) and Role:     * Shon Garcia MD - Primary    Assisting Surgeon: None    Pre-op Diagnosis:  Abnormal abdominal CT scan [R93.5]    Post-op Diagnosis:  Post-Op Diagnosis Codes:  Acute cholecystitis      Procedure(s) (LRB):  CHOLECYSTECTOMY-LAPAROSCOPIC (N/A)    Anesthesia: General    Description of Procedure: laparoscopic cholecystectomy.      Description of the findings of the procedure: Distended gallbladder.  Significant inflammatory changes.     Estimated Blood Loss: 15 mL         Specimens:   Specimen (12h ago through future)    Start     Ordered    02/27/18 1609  Specimen to Pathology - Surgery  Once     Comments:  Pre-op Diagnosis: Abnormal abdominal CT scan [R93.5]Post-op Diagnosis: sameProcedure(s):CHOLECYSTECTOMY-LAPAROSCOPIC Number of specimens: 1Name of specimens: 1) gallbladder      02/27/18 1608

## 2018-02-27 NOTE — SUBJECTIVE & OBJECTIVE
No current facility-administered medications on file prior to encounter.      Current Outpatient Prescriptions on File Prior to Encounter   Medication Sig    ciprofloxacin HCl (CIPRO) 500 MG tablet Take 500 mg by mouth every 12 (twelve) hours.    diphenhydrAMINE (BENADRYL) 25 mg capsule Take by mouth.    gabapentin (NEURONTIN) 300 MG capsule Take 1 capsule (300 mg total) by mouth 3 (three) times daily.    lisinopril (PRINIVIL,ZESTRIL) 20 MG tablet 20 mg every evening.     metroNIDAZOLE (FLAGYL) 500 MG tablet Take 500 mg by mouth every 8 (eight) hours.    triamterene-hydrochlorothiazide 37.5-25 mg (DYAZIDE) 37.5-25 mg per capsule     albuterol (PROAIR HFA) 90 mcg/actuation inhaler Inhale 2 puffs into the lungs every 6 (six) hours as needed for Wheezing. Rescue    SUPREP BOWEL PREP KIT 17.5-3.13-1.6 gram SolR        Review of patient's allergies indicates:   Allergen Reactions    Fruit flavor Anaphylaxis     Pine apples       Past Medical History:   Diagnosis Date    Allergy     Arthritis     Asthma     Coronary artery disease     Hypertension     Hyperthyroidism     Lumbar radiculitis 1/30/2018     Past Surgical History:   Procedure Laterality Date    APPENDECTOMY      CHOLECYSTECTOMY      SPINE SURGERY      TONSILLECTOMY       Family History     None        Social History Main Topics    Smoking status: Current Every Day Smoker    Smokeless tobacco: Current User    Alcohol use 0.6 oz/week     1 Glasses of wine per week    Drug use: No    Sexual activity: Yes     Review of Systems   Constitutional: Negative for activity change, appetite change, fever and unexpected weight change.   HENT: Negative for congestion and facial swelling.    Respiratory: Negative for chest tightness, shortness of breath and wheezing.    Cardiovascular: Negative for chest pain.   Gastrointestinal: Negative for abdominal distention, abdominal pain, constipation, diarrhea, nausea and vomiting.   Genitourinary: Negative  for difficulty urinating, dysuria and frequency.   Skin: Negative for color change and wound.   Neurological: Negative for dizziness.   Hematological: Negative for adenopathy. Does not bruise/bleed easily.   Psychiatric/Behavioral: Negative for agitation and decreased concentration.     Objective:     Vital Signs (Most Recent):  Temp: 96.7 °F (35.9 °C) (02/27/18 1207)  Pulse: 69 (02/27/18 1207)  Resp: 14 (02/27/18 1207)  BP: 135/69 (02/27/18 1207)  SpO2: 97 % (02/27/18 1207) Vital Signs (24h Range):  Temp:  [96.6 °F (35.9 °C)-98.3 °F (36.8 °C)] 96.7 °F (35.9 °C)  Pulse:  [60-70] 69  Resp:  [14-20] 14  SpO2:  [95 %-98 %] 97 %  BP: (114-136)/(58-69) 135/69     Weight: 86 kg (189 lb 11.2 oz)  Body mass index is 33.6 kg/m².    Physical Exam   Constitutional: She is oriented to person, place, and time. She appears well-developed and well-nourished.   HENT:   Head: Normocephalic and atraumatic.   Eyes: Pupils are equal, round, and reactive to light. Right eye exhibits no discharge. Left eye exhibits no discharge. No scleral icterus.   Neck: Normal range of motion. Neck supple. No tracheal deviation present. No thyromegaly present.   Cardiovascular: Normal rate, regular rhythm and normal heart sounds.    No murmur heard.  Pulmonary/Chest: Effort normal and breath sounds normal. She exhibits no tenderness.   Abdominal: Soft. Bowel sounds are normal. She exhibits no distension, no abdominal bruit, no pulsatile midline mass and no mass. There is no hepatosplenomegaly. There is no tenderness. There is no rigidity, no rebound, no guarding, no tenderness at McBurney's point and negative Eason's sign. No hernia. Hernia confirmed negative in the ventral area.   Genitourinary: Rectum normal.   Musculoskeletal: Normal range of motion.   Lymphadenopathy:     She has no cervical adenopathy.   Neurological: She is alert and oriented to person, place, and time. No cranial nerve deficit. Coordination normal.   Skin: Skin is warm. No  rash noted. No erythema.   Psychiatric: She has a normal mood and affect. Her behavior is normal.   Vitals reviewed.      Significant Labs:  CBC:   Recent Labs  Lab 02/27/18  0634   WBC 5.90   RBC 3.19*   HGB 9.9*   HCT 30.0*      MCV 94   MCH 31.2*   MCHC 33.2     CMP:   Recent Labs  Lab 02/27/18  0634   GLU 92  92   CALCIUM 9.2  9.2   ALBUMIN 2.9*   PROT 4.9*     145   K 3.9  3.9   CO2 23  23   *  115*   BUN 20  20   CREATININE 0.9  0.9   ALKPHOS 38*   ALT 33   AST 35   BILITOT 0.6       Significant Diagnostics:  MRCP: reviewed findings suggestive of acute cholecystitis.

## 2018-02-27 NOTE — HPI
Pleasant 63 yo F whom I have been consulted on by Dr Novak regarding cholecystitis. Pt admitted to hospital 5 days ago with severe lower abdominal pain and back pain.  Pt notes that pain has been present for several days.  She does note that she had an episode of n/v prior to admission to hospital at that time.  She does not recall any epigastric pain or RUQ.  She has had no fever/chills.  Pt noted to have enlarged CBD on recent CT scan which led to MRCP yesterday.  MRCP demonstrated mildly enlarged CBD with no filling defects.  THere was significant GB wall edema, pericholecystitic fluid and findings suggestive of cholecystitis.  I have been asked to see the  Pt regarding this.

## 2018-02-27 NOTE — CONSULTS
[facility]  General Surgery  Consult Note    Patient Name: Nancy Muñoz  MRN: [id number]  Code Status: Full Code  Admission Date: 2/23/2018  Hospital Length of Stay: 1 days  Attending Physician: Zac Novak MD  Primary Care Provider: Jan Petersen MD    Patient information was obtained from patient and ER records.     Inpatient consult to General Surgery  Consult performed by: KRISTINE NIETO  Consult ordered by: EDER FIELD  Reason for consult: cholecystitis  Assessment/Recommendations: TO OR today for lap santino        Subjective:     Principal Problem: Lower abdominal pain    History of Present Illness: Pleasant 61 yo F whom I have been consulted on by Dr Novak regarding cholecystitis. Pt admitted to hospital 5 days ago with severe lower abdominal pain and back pain.  Pt notes that pain has been present for several days.  She does note that she had an episode of n/v prior to admission to hospital at that time.  She does not recall any epigastric pain or RUQ.  She has had no fever/chills.  Pt noted to have enlarged CBD on recent CT scan which led to MRCP yesterday.  MRCP demonstrated mildly enlarged CBD with no filling defects.  THere was significant GB wall edema, pericholecystitic fluid and findings suggestive of cholecystitis.  I have been asked to see the  Pt regarding this.      No current facility-administered medications on file prior to encounter.      Current Outpatient Prescriptions on File Prior to Encounter   Medication Sig    ciprofloxacin HCl (CIPRO) 500 MG tablet Take 500 mg by mouth every 12 (twelve) hours.    diphenhydrAMINE (BENADRYL) 25 mg capsule Take by mouth.    gabapentin (NEURONTIN) 300 MG capsule Take 1 capsule (300 mg total) by mouth 3 (three) times daily.    lisinopril (PRINIVIL,ZESTRIL) 20 MG tablet 20 mg every evening.     metroNIDAZOLE (FLAGYL) 500 MG tablet Take 500 mg by mouth every 8 (eight) hours.    triamterene-hydrochlorothiazide 37.5-25 mg  (DYAZIDE) 37.5-25 mg per capsule     albuterol (PROAIR HFA) 90 mcg/actuation inhaler Inhale 2 puffs into the lungs every 6 (six) hours as needed for Wheezing. Rescue    SUPREP BOWEL PREP KIT 17.5-3.13-1.6 gram SolR        Review of patient's allergies indicates:   Allergen Reactions    Fruit flavor Anaphylaxis     Pine apples       Past Medical History:   Diagnosis Date    Allergy     Arthritis     Asthma     Coronary artery disease     Hypertension     Hyperthyroidism     Lumbar radiculitis 1/30/2018     Past Surgical History:   Procedure Laterality Date    APPENDECTOMY      CHOLECYSTECTOMY      SPINE SURGERY      TONSILLECTOMY       Family History     None        Social History Main Topics    Smoking status: Current Every Day Smoker    Smokeless tobacco: Current User    Alcohol use 0.6 oz/week     1 Glasses of wine per week    Drug use: No    Sexual activity: Yes     Review of Systems   Constitutional: Negative for activity change, appetite change, fever and unexpected weight change.   HENT: Negative for congestion and facial swelling.    Respiratory: Negative for chest tightness, shortness of breath and wheezing.    Cardiovascular: Negative for chest pain.   Gastrointestinal: Negative for abdominal distention, abdominal pain, constipation, diarrhea, nausea and vomiting.   Genitourinary: Negative for difficulty urinating, dysuria and frequency.   Skin: Negative for color change and wound.   Neurological: Negative for dizziness.   Hematological: Negative for adenopathy. Does not bruise/bleed easily.   Psychiatric/Behavioral: Negative for agitation and decreased concentration.     Objective:     Vital Signs (Most Recent):  Temp: 96.7 °F (35.9 °C) (02/27/18 1207)  Pulse: 69 (02/27/18 1207)  Resp: 14 (02/27/18 1207)  BP: 135/69 (02/27/18 1207)  SpO2: 97 % (02/27/18 1207) Vital Signs (24h Range):  Temp:  [96.6 °F (35.9 °C)-98.3 °F (36.8 °C)] 96.7 °F (35.9 °C)  Pulse:  [60-70] 69  Resp:  [14-20]  14  SpO2:  [95 %-98 %] 97 %  BP: (114-136)/(58-69) 135/69     Weight: 86 kg (189 lb 11.2 oz)  Body mass index is 33.6 kg/m².    Physical Exam   Constitutional: She is oriented to person, place, and time. She appears well-developed and well-nourished.   HENT:   Head: Normocephalic and atraumatic.   Eyes: Pupils are equal, round, and reactive to light. Right eye exhibits no discharge. Left eye exhibits no discharge. No scleral icterus.   Neck: Normal range of motion. Neck supple. No tracheal deviation present. No thyromegaly present.   Cardiovascular: Normal rate, regular rhythm and normal heart sounds.    No murmur heard.  Pulmonary/Chest: Effort normal and breath sounds normal. She exhibits no tenderness.   Abdominal: Soft. Bowel sounds are normal. She exhibits no distension, no abdominal bruit, no pulsatile midline mass and no mass. There is no hepatosplenomegaly. There is no tenderness. There is no rigidity, no rebound, no guarding, no tenderness at McBurney's point and negative Eason's sign. No hernia. Hernia confirmed negative in the ventral area.   Genitourinary: Rectum normal.   Musculoskeletal: Normal range of motion.   Lymphadenopathy:     She has no cervical adenopathy.   Neurological: She is alert and oriented to person, place, and time. No cranial nerve deficit. Coordination normal.   Skin: Skin is warm. No rash noted. No erythema.   Psychiatric: She has a normal mood and affect. Her behavior is normal.   Vitals reviewed.      Significant Labs:  CBC:   Recent Labs  Lab 02/27/18  0634   WBC 5.90   RBC 3.19*   HGB 9.9*   HCT 30.0*      MCV 94   MCH 31.2*   MCHC 33.2     CMP:   Recent Labs  Lab 02/27/18  0634   GLU 92  92   CALCIUM 9.2  9.2   ALBUMIN 2.9*   PROT 4.9*     145   K 3.9  3.9   CO2 23  23   *  115*   BUN 20  20   CREATININE 0.9  0.9   ALKPHOS 38*   ALT 33   AST 35   BILITOT 0.6       Significant Diagnostics:  MRCP: reviewed findings suggestive of acute cholecystitis.        Assessment/Plan:   Acute cholecystitis  No notes have been filed under this hospital service.  Service: General Surgery    VTE Risk Mitigation         Ordered     Place sequential compression device  Until discontinued      02/27/18 0111     Place SHELLY hose  Until discontinued      02/26/18 1030     Medium Risk of VTE  Once      02/23/18 1302        To OR today for lap santino  Thank you for your consult. I will follow-up with patient. Please contact us if you have any additional questions.    Shon Garcia MD  General Surgery  [facility]

## 2018-02-27 NOTE — PLAN OF CARE
Problem: Patient Care Overview  Goal: Plan of Care Review  Outcome: Ongoing (interventions implemented as appropriate)  Patient AAO, VSS.  IVF and abx infusing as ordered.  Tele monitored; sinus rhythm.  Patient reports pain while walking but none while at rest.  Patient free from falls and injury during shift.  Non skid socks on when OOB.  Bed in lowest position, brakes locked, and call light within reach.  Will continue to monitor.

## 2018-02-27 NOTE — ANESTHESIA PREPROCEDURE EVALUATION
02/27/2018  Nancy Muñoz is a 62 y.o., female.    Pre-op Assessment    I have reviewed the Patient Summary Reports.     I have reviewed the Nursing Notes.   I have reviewed the Medications.     Review of Systems         Anesthesia Plan  Type of Anesthesia, risks & benefits discussed:  Anesthesia Type:  general  Patient's Preference:   Intra-op Monitoring Plan:   Intra-op Monitoring Plan Comments:   Post Op Pain Control Plan:   Post Op Pain Control Plan Comments:   Induction:    Beta Blocker:         Informed Consent:    ASA Score:      Day of Surgery Review of History & Physical:

## 2018-02-27 NOTE — PLAN OF CARE
"Problem: Patient Care Overview  Goal: Plan of Care Review  Outcome: Ongoing (interventions implemented as appropriate)  NPO after breakfast for procedure, pt is ambulating at abby with contact guard assist, IVF infusing without difficulty, no redness/swelling noted to site, maintaining sinus rhythm on telemetry, denies diarrhea this shift, current pain regimen is controlling left groin/hip and back pain, pt states "i hurt more when I walk," will continue to monitor, observe and note any changes, safety maintain      "

## 2018-02-27 NOTE — PROGRESS NOTES
Dr. Garcia consulted for general sx, patient had breakfast, OR notified, whom contacted anesthesia, possible case at 4pm, patient order NPO placed

## 2018-02-27 NOTE — NURSING
Notified ARMIDA Amaya of [facility] report of MRI via secure chat.      Received order for SCDs and general surgery consult.

## 2018-02-27 NOTE — PROGRESS NOTES
Progress Note  Hospital Medicine  Patient Name:Nancy Muñoz  MRN:  [id number]  Patient Class: IP- Inpatient  Admit Date: 2/23/2018  Length of Stay: 1 days  Expected Discharge Date:   Attending Physician: Zac Novak MD  Primary Care Provider:  Jan Petersen MD    SUBJECTIVE:     Principal Problem: Lower abdominal pain  Initial history of present illness: Patient is a 62 y.o. female admitted to Hospitalist Service from Dr. Petersen's office with complaint of lower abdominal pain for 2 days. Patient reportedly has past medical history significant for recurrent acute diverticulitis (most recent episode one month ago, under care by Dr. Valera), hypertension, hyperlipidemia, CAD and chronic low back pain (under care by neurosurgeon in [place]). Patient had colonoscopy performed 1 month ago. Patient has been having lower abdominal pain for few days and intermittent fever 2 days. Patient is taking oral Cipro and Flagyl at the directions by Dr. Kohler for 7 days. No rectal bleeding or melena. Symptoms remind her of prior diverticulitis episode. Patient denied chest pain, shortness of breath, headache, vision changes, focal neuro-deficits, cough or fever.       PMH/PSH/SH/FH/Meds: reviewed.    Symptoms/Review of Systems: Patient is scheduled for laparoscopic cholecystectomy. No shortness of breath, cough, chest pain or headache, fever.     Diet: NPO  Activity level: Normal.    Pain: Chronic lower back pain     OBJECTIVE:   Vital Signs (Most Recent):      Temp: 96.6 °F (35.9 °C) (02/27/18 0747)  Pulse: 60 (02/27/18 0747)  Resp: 20 (02/27/18 0747)  BP: 136/69 (02/27/18 0747)  SpO2: 98 % (02/27/18 0747)       Vital Signs Range (Last 24H):  Temp:  [96.6 °F (35.9 °C)-98.3 °F (36.8 °C)]   Pulse:  [60-70]   Resp:  [18-20]   BP: (111-136)/(56-69)   SpO2:  [95 %-98 %]     Weight: 86 kg (189 lb 11.2 oz)  Body mass index is 33.6 kg/m².    Intake/Output Summary (Last 24 hours) at 02/27/18 0921  Last data filed at 02/27/18 0600    Gross per 24 hour   Intake          2342.92 ml   Output                0 ml   Net          2342.92 ml     Physical Examination:  General appearance: well developed, appears stated age  Head: normocephalic, atraumatic  Eyes:  conjunctivae/corneas clear. PERRL.  Nose: Nares normal. Septum midline.  Throat: lips, mucosa, and tongue normal; teeth and gums normal, no throat erythema.  Neck: supple, symmetrical, trachea midline, no JVD and thyroid not enlarged, symmetric, no tenderness/mass/nodules  Lungs:  clear to auscultation bilaterally and normal respiratory effort  Chest wall: no tenderness  Heart: regular rate and rhythm, S1, S2 normal, no murmur, click, rub or gallop  Abdomen: soft, tenderness in LLQ, non-distented; bowel sounds normal; no masses,  no organomegaly  Extremities: no cyanosis, clubbing or edema.   Pulses: 2+ and symmetric  Skin: Skin color, texture, turgor normal. No rashes or lesions.  Lymph nodes: Cervical, supraclavicular, and axillary nodes normal.  Neurologic: Normal strength and tone. No focal numbness or weakness. CNII-XII intact.      CBC:    Recent Labs  Lab 02/25/18  0511 02/25/18  1452 02/26/18  0516 02/27/18  0634   WBC 4.50  --  5.70 5.90   RBC 3.14*  --  3.25* 3.19*   HGB 9.9* 11.0* 10.1* 9.9*   HCT 29.4* 33.3* 30.8* 30.0*     --  168 161   MCV 94  --  95 94   MCH 31.6*  --  31.2* 31.2*   MCHC 33.8  --  32.9 33.2   BMP    Recent Labs  Lab 02/25/18  0511 02/26/18  0516 02/27/18  0634   GLU 89  89 96  96 92  92     144 143  143 145  145   K 3.9  3.9 3.8  3.8 3.9  3.9   *  113* 114*  114* 115*  115*   CO2 24  24 24  24 23  23   BUN 26*  26* 27*  27* 20  20   CREATININE 1.0  1.0 1.0  1.0 0.9  0.9   CALCIUM 9.6  9.6 9.5  9.5 9.2  9.2      Diagnostic Results:  Microbiology Results (last 7 days)     Procedure Component Value Units Date/Time    Stool culture [[id number]] Collected:  02/23/18 7849    Order Status:  Completed Specimen:  Stool from Stool  Updated:  02/26/18 1205     Stool Culture Nothing significant to date    E. coli 0157 antigen [[id number]] Collected:  02/23/18 2235    Order Status:  Completed Specimen:  Stool from Stool Updated:  02/25/18 1947     Shiga Toxin 1 E.coli Negative     Shiga Toxin 2 E.coli Negative    Urine Culture [[id number]] Collected:  02/23/18 2230    Order Status:  Completed Specimen:  Urine from Urine, Clean Catch Updated:  02/25/18 1318     Urine Culture, Routine No growth    Clostridium difficile EIA [[id number]] Collected:  02/23/18 2235    Order Status:  Completed Specimen:  Stool from Stool Updated:  02/24/18 0242     C. diff Antigen Negative     C difficile Toxins A+B, EIA Negative     Comment: Testing not recommended for children <24 months old.            CT abdomen and pelvis:  1.  Descending and sigmoid colonic diverticulosis without evidence for diverticulitis.  2. Additional findings:  -Cholelithiasis. There is also a borderline dilated common bile duct for age (measuring up to 9 mm in diameter) to the level of the ampulla without identifiable obstructive etiology by CT. Correlation for potentially related symptoms and laboratory abnormalities is recommended.  -Bilateral, nonobstructive lower pole nephrolithiasis.    Bilateral hip x-ray:  No acute radiographic findings to explain this patient's hip pain. Mild, symmetric bilateral SI joint DJD is noted.    MRCP:  1. Cholelithiasis and findings of acute cholecystitis with gallbladder wall thickening/edema and moderate pericholecystic fluid.  Mildly dilated common bile duct without evidence for choledocholithiasis.  2. Small right pleural effusion.  3. Right lower pole nephrolithiasis.  The preliminary and final results are concordant.    Assessment/Plan:      *Lower abdominal pain [R10.30]   Yes    Diverticulitis [K57.92]  On IV Zosyn and Falgyl.  Obtain Colonoscopy results from Dr. Valera.   Supportive care with PO narcotics and anti-emetics prn.      Yes     Hypertension [I10]  Anti-HTN medications are on hold      Yes    Hypercalcemia [E83.52] - possibly primary hyperparathyroidism  Continue IVF hydration. Serum Ca better. Off HCTZ.  May need outpatient Sistamibi scan. Per patient not a new problem.    UTI  Urine Cx NTD. Continue IV antibiotics.    Possible GI bleeding  Dr. Jordan following.    CBD dilatation   Acute cholecystitis with cholelithiasis  No RUQ pain, LFTs WNL. MRCP results reviewed. Lap. Elidia scheduled for today. Discussed with Dr. Jordan.    Lower back pain/ left sided hip pain  - Hip X-rays without acute process.  - follow up MRI lower back as outpatent   Yes         DVT prophylaxis: Use SCd and TEDs. Anticoagulation is hold due to possible GI bleeding.          VTE Risk Mitigation         Ordered     Place sequential compression device  Until discontinued      02/27/18 0111     Place SHELLY hose  Until discontinued      02/26/18 1030     Medium Risk of VTE  Once      02/23/18 1302        Zac Novak MD  Department of Hospital Medicine   [facility]

## 2018-02-27 NOTE — ANESTHESIA PREPROCEDURE EVALUATION
02/27/2018  Nancy Muñoz is a 62 y.o., female.    Anesthesia Evaluation    I have reviewed the Patient Summary Reports.    I have reviewed the Nursing Notes.   I have reviewed the Medications.     Review of Systems  Anesthesia Hx:  No problems with previous Anesthesia    Social:  Smoker    Cardiovascular:   Hypertension CAD      Pulmonary:   Asthma asymptomatic    Neurological:   Neuromuscular Disease,    Endocrine:   Hyperthyroidism        Physical Exam  General:  Obesity    Airway/Jaw/Neck:  Airway Findings: Mouth Opening: Normal Tongue: Normal  General Airway Assessment: Adult, Good  Mallampati: II  Improves to II with phonation.  TM Distance: 4-6 cm      Dental:  Dental Findings: In tact   Chest/Lungs:  Chest/Lungs Findings: Clear to auscultation, Normal Respiratory Rate     Heart/Vascular:  Heart Findings: Rate: Normal  Rhythm: Regular Rhythm  Sounds: Normal  Heart murmur: negative       Mental Status:  Mental Status Findings:  Cooperative, Alert and Oriented         Anesthesia Plan  Type of Anesthesia, risks & benefits discussed:  Anesthesia Type:  general  Patient's Preference:   Intra-op Monitoring Plan: standard ASA monitors  Intra-op Monitoring Plan Comments:   Post Op Pain Control Plan:   Post Op Pain Control Plan Comments:   Induction:   IV  Beta Blocker:  Patient is not currently on a Beta-Blocker (No further documentation required).       Informed Consent: Patient understands risks and agrees with Anesthesia plan.  Questions answered. Anesthesia consent signed with patient.  ASA Score: 2  emergent   Day of Surgery Review of History & Physical: I have interviewed and examined the patient. I have reviewed the patient's H&P dated:  There are no significant changes.          Ready For Surgery From Anesthesia Perspective.

## 2018-02-28 VITALS
OXYGEN SATURATION: 98 % | BODY MASS INDEX: 26.4 KG/M2 | HEART RATE: 54 BPM | SYSTOLIC BLOOD PRESSURE: 100 MMHG | TEMPERATURE: 97 F | HEIGHT: 63 IN | RESPIRATION RATE: 18 BRPM | DIASTOLIC BLOOD PRESSURE: 70 MMHG | WEIGHT: 149 LBS

## 2018-02-28 LAB
ALBUMIN SERPL BCP-MCNC: 2.8 G/DL
ALP SERPL-CCNC: 36 U/L
ALT SERPL W/O P-5'-P-CCNC: 41 U/L
ANION GAP SERPL CALC-SCNC: 5 MMOL/L
ANION GAP SERPL CALC-SCNC: 5 MMOL/L
AST SERPL-CCNC: 31 U/L
BASOPHILS # BLD AUTO: 0 K/UL
BASOPHILS NFR BLD: 0 %
BILIRUB SERPL-MCNC: 0.5 MG/DL
BUN SERPL-MCNC: 26 MG/DL
BUN SERPL-MCNC: 26 MG/DL
CALCIUM SERPL-MCNC: 9.1 MG/DL
CALCIUM SERPL-MCNC: 9.1 MG/DL
CHLORIDE SERPL-SCNC: 115 MMOL/L
CHLORIDE SERPL-SCNC: 115 MMOL/L
CO2 SERPL-SCNC: 22 MMOL/L
CO2 SERPL-SCNC: 22 MMOL/L
CREAT SERPL-MCNC: 0.9 MG/DL
CREAT SERPL-MCNC: 0.9 MG/DL
DIFFERENTIAL METHOD: ABNORMAL
EOSINOPHIL # BLD AUTO: 0 K/UL
EOSINOPHIL NFR BLD: 0.1 %
ERYTHROCYTE [DISTWIDTH] IN BLOOD BY AUTOMATED COUNT: 12.6 %
EST. GFR  (AFRICAN AMERICAN): >60 ML/MIN/1.73 M^2
EST. GFR  (AFRICAN AMERICAN): >60 ML/MIN/1.73 M^2
EST. GFR  (NON AFRICAN AMERICAN): >60 ML/MIN/1.73 M^2
EST. GFR  (NON AFRICAN AMERICAN): >60 ML/MIN/1.73 M^2
GLUCOSE SERPL-MCNC: 122 MG/DL
GLUCOSE SERPL-MCNC: 122 MG/DL
HCT VFR BLD AUTO: 27.6 %
HGB BLD-MCNC: 9.4 G/DL
LYMPHOCYTES # BLD AUTO: 0.9 K/UL
LYMPHOCYTES NFR BLD: 13.3 %
MCH RBC QN AUTO: 31.7 PG
MCHC RBC AUTO-ENTMCNC: 33.8 G/DL
MCV RBC AUTO: 94 FL
MONOCYTES # BLD AUTO: 0.6 K/UL
MONOCYTES NFR BLD: 8.2 %
NEUTROPHILS # BLD AUTO: 5.5 K/UL
NEUTROPHILS NFR BLD: 78.4 %
PLATELET # BLD AUTO: 151 K/UL
PMV BLD AUTO: 8.4 FL
POTASSIUM SERPL-SCNC: 4 MMOL/L
POTASSIUM SERPL-SCNC: 4 MMOL/L
PROT SERPL-MCNC: 4.7 G/DL
RBC # BLD AUTO: 2.96 M/UL
SODIUM SERPL-SCNC: 142 MMOL/L
SODIUM SERPL-SCNC: 142 MMOL/L
WBC # BLD AUTO: 7 K/UL

## 2018-02-28 PROCEDURE — 80053 COMPREHEN METABOLIC PANEL: CPT

## 2018-02-28 PROCEDURE — 25000003 PHARM REV CODE 250: Performed by: INTERNAL MEDICINE

## 2018-02-28 PROCEDURE — 36415 COLL VENOUS BLD VENIPUNCTURE: CPT

## 2018-02-28 PROCEDURE — 99239 HOSP IP/OBS DSCHRG MGMT >30: CPT | Mod: ,,, | Performed by: INTERNAL MEDICINE

## 2018-02-28 PROCEDURE — 63600175 PHARM REV CODE 636 W HCPCS: Performed by: INTERNAL MEDICINE

## 2018-02-28 PROCEDURE — S0030 INJECTION, METRONIDAZOLE: HCPCS | Performed by: INTERNAL MEDICINE

## 2018-02-28 PROCEDURE — 85025 COMPLETE CBC W/AUTO DIFF WBC: CPT

## 2018-02-28 PROCEDURE — 99900035 HC TECH TIME PER 15 MIN (STAT)

## 2018-02-28 RX ORDER — CIPROFLOXACIN 500 MG/1
500 TABLET ORAL EVERY 12 HOURS
Qty: 14 TABLET | Refills: 0 | Status: ON HOLD | OUTPATIENT
Start: 2018-02-28 | End: 2019-08-25 | Stop reason: CLARIF

## 2018-02-28 RX ORDER — METRONIDAZOLE 500 MG/1
500 TABLET ORAL EVERY 8 HOURS
Qty: 21 TABLET | Refills: 0 | Status: ON HOLD | OUTPATIENT
Start: 2018-02-28 | End: 2019-08-25 | Stop reason: CLARIF

## 2018-02-28 RX ADMIN — METRONIDAZOLE 500 MG: 500 INJECTION, SOLUTION INTRAVENOUS at 01:02

## 2018-02-28 RX ADMIN — PIPERACILLIN AND TAZOBACTAM 4.5 G: 4; .5 INJECTION, POWDER, LYOPHILIZED, FOR SOLUTION INTRAVENOUS; PARENTERAL at 06:02

## 2018-02-28 RX ADMIN — GABAPENTIN 300 MG: 300 CAPSULE ORAL at 06:02

## 2018-02-28 RX ADMIN — GABAPENTIN 300 MG: 300 CAPSULE ORAL at 01:02

## 2018-02-28 RX ADMIN — PIPERACILLIN AND TAZOBACTAM 4.5 G: 4; .5 INJECTION, POWDER, LYOPHILIZED, FOR SOLUTION INTRAVENOUS; PARENTERAL at 01:02

## 2018-02-28 RX ADMIN — METRONIDAZOLE 500 MG: 500 INJECTION, SOLUTION INTRAVENOUS at 06:02

## 2018-02-28 RX ADMIN — PANTOPRAZOLE SODIUM 40 MG: 40 TABLET, DELAYED RELEASE ORAL at 09:02

## 2018-02-28 NOTE — OP NOTE
DATE OF PROCEDURE:  02/27/2018    STAFF SURGEON:  Shon Garcia M.D.    PREOPERATIVE DIAGNOSIS:  Acute cholecystitis.    POSTOPERATIVE DIAGNOSIS:  Acute cholecystitis.    PROCEDURE:  Laparoscopic cholecystectomy.    ANESTHESIA:  General anesthesia.    INDICATION FOR PROCEDURE:  A pleasant 62-year-old female admitted to the   hospital for several days, presenting with left lower abdominal pain, nausea,   vomiting and diarrhea and back pain, she has had workup.  The patient was found   on MRCP to have finding suggestive of acute cholecystitis yesterday.  At that   time, surgery was consulted.    DESCRIPTION OF PROCEDURE:  Following signing of informed consent, she received   preoperative IV antibiotics, taken to the OR and placed in supine position.    SCDs were placed.  General endotracheal anesthesia was administered without   event.  Abdomen was prepped and draped.  Appropriate timeout procedure was then   performed.  Having performed timeout procedure, I made a small transverse   incision above the umbilicus, carried down through deep dermal tissue and   entered the abdominal cavity with a Veress needle and established   pneumoperitoneum to 15 mmHg.  Having established pneumoperitoneum, I entered the   abdominal cavity with an Optiview trocar under direct visualization and   evaluated for injury from the Veress needle.  No such injury was appreciated.    Having done this I placed the patient in reverse Trendelenburg and tilted   towards his left side.  I placed a 5-mm epigastric trocar under direct   visualization.  I then placed two 5-mm trocars in the subcostal margin under   direct visualization.  All trocars were placed under direct visualization.  All   were placed after injection with 0.25% Marcaine with epinephrine, gallbladder   appears acutely inflamed with significant distention and thickening of the   gallbladder wall.  I grasped the fundus of the gallbladder, holding it up over   the liver exposing the  infundibulum.  I take down omental adhesions to the   gallbladder and having full exposing of the infundibulum.  I dissected the   triangle of Calot, identifying the cystic duct and cystic artery in the usual   anatomic locations . I placed 2 clips distally on the cystic duct, 1 clip   proximally and 2 clips were placed on the cystic artery and I cut between clips,   used hook cautery to remove the gallbladder from the hepatic fossa.  I then   ensured hemostasis of the hepatic fossa.  I removed the gallbladder from the   abdominal cavity with assistance of an EndoCatch bag.  I removed the gallbladder   out the hepatic fossa to ensure there was adequate hemostasis.  Having ensured   adequate hemostasis.  I removed all trocars under direct visualization and   closed the umbilical fascia with 0 Vicryl suture.  Skin incision closed with 4-0   Monocryl.  The patient was extubated, taken to Recovery Room in stable   condition.  There were no immediate complications.  Blood loss was approximately   50 mL      RICHDP/IN  dd: 02/27/2018 16:42:47 (CST)  td: 02/27/2018 23:04:19 (CST)  Doc ID   #[id number]  Job ID #[id number]    CC:

## 2018-02-28 NOTE — DISCHARGE SUMMARY
Discharge Summary  Hospital Medicine    Admit Date: 2/23/2018    Date and Time: 2/28/20182:38 PM    Discharge Attending Physician: Zac Novak MD    Primary Care Physician: Jan Petersen MD    Diagnoses:  Active Hospital Problems    Diagnosis  POA    *Lower abdominal pain [R10.30]  Yes    S/p laparoscopic Cholecystitis [K81.9]  No           Abnormal abdominal CT scan [R93.5]  Yes    Diverticulitis [K57.92]  Yes    Hypertension [I10]  Yes    Hypercalcemia [E83.52]  Yes        Discharged Condition: Good    Hospital Course:   Patient is a 62 y.o. female admitted to Hospitalist Service from Dr. Petersen's office with complaint of lower abdominal pain for 2 days. Patient reportedly has past medical history significant for recurrent acute diverticulitis (most recent episode one month ago, under care by Dr. Valera), hypertension, hyperlipidemia, CAD and chronic low back pain (under care by neurosurgeon in [place]). Patient had colonoscopy performed 1 month ago. Patient had been having lower abdominal pain for few days and intermittent fever 2 days. Patient is taking oral Cipro and Flagyl at the directions by Dr. Kohler for 7 days. No rectal bleeding or melena. Symptoms remind her of prior diverticulitis episode. Patient denied chest pain, shortness of breath, headache, vision changes, focal neuro-deficits, cough or fever. Patient was admitted to Hospitalist medicine service. Patient was evaluated by Dr. Jordan. Patient treated with IV antibiotics. Radiological imaging showed CBD dilatation. MRCP showed acute cholecystitis with cholelithiasis. Dr. Garcia performed laparoscopic cholecystectomy. Symptoms resolved. Patient to consider outpatient Sistamibi scan for hypercalcemia evaluation. Serum Ca level improved during hospital stay. Patient was discharged home in stable condition with following discharge plan of care. Total time with the patient was 30 minutes and greater than 50% was spent in counseling and  coordination of care. The assessment and plan have been discussed at length. Physicians' notes reviewed. Labs and procedure reviewed.     Consults: Dr. Jordan and Dr. Garcia    Significant Diagnostic Studies:   CT abdomen and pelvis:  1.  Descending and sigmoid colonic diverticulosis without evidence for diverticulitis.  2. Additional findings:  -Cholelithiasis. There is also a borderline dilated common bile duct for age (measuring up to 9 mm in diameter) to the level of the ampulla without identifiable obstructive etiology by CT. Correlation for potentially related symptoms and laboratory abnormalities is recommended.  -Bilateral, nonobstructive lower pole nephrolithiasis.     Bilateral hip x-ray:  No acute radiographic findings to explain this patient's hip pain. Mild, symmetric bilateral SI joint DJD is noted.     MRCP:  1. Cholelithiasis and findings of acute cholecystitis with gallbladder wall thickening/edema and moderate pericholecystic fluid.  Mildly dilated common bile duct without evidence for choledocholithiasis.  2. Small right pleural effusion.  3. Right lower pole nephrolithiasis.  The preliminary and final results are concordant.    Microbiology Results (last 7 days)     Procedure Component Value Units Date/Time    Stool culture [[id number]] Collected:  02/23/18 2235    Order Status:  Completed Specimen:  Stool from Stool Updated:  02/27/18 1104     Stool Culture No Salmonella,Shigella,Vibrio,Campylobacter,Yersinia isolated.    E. coli 0157 antigen [[id number]] Collected:  02/23/18 2235    Order Status:  Completed Specimen:  Stool from Stool Updated:  02/25/18 1947     Shiga Toxin 1 E.coli Negative     Shiga Toxin 2 E.coli Negative    Urine Culture [[id number]] Collected:  02/23/18 2230    Order Status:  Completed Specimen:  Urine from Urine, Clean Catch Updated:  02/25/18 1318     Urine Culture, Routine No growth    Clostridium difficile EIA [[id number]] Collected:  02/23/18 2235    Order Status:   Completed Specimen:  Stool from Stool Updated:  02/24/18 0242     C. diff Antigen Negative     C difficile Toxins A+B, EIA Negative     Comment: Testing not recommended for children <24 months old.           Special Treatments/Procedures: as above  Disposition: Home or Self Care    Medications:  Reconciled Home Medications: Current Discharge Medication List      CONTINUE these medications which have CHANGED    Details   ciprofloxacin HCl (CIPRO) 500 MG tablet Take 1 tablet (500 mg total) by mouth every 12 (twelve) hours.  Qty: 14 tablet, Refills: 0      metroNIDAZOLE (FLAGYL) 500 MG tablet Take 1 tablet (500 mg total) by mouth every 8 (eight) hours.  Qty: 21 tablet, Refills: 0         CONTINUE these medications which have NOT CHANGED    Details   diphenhydrAMINE (BENADRYL) 25 mg capsule Take by mouth.      gabapentin (NEURONTIN) 300 MG capsule Take 1 capsule (300 mg total) by mouth 3 (three) times daily.  Qty: 90 capsule, Refills: 1    Associated Diagnoses: DDD (degenerative disc disease), lumbar; Lumbar radiculitis      lisinopril (PRINIVIL,ZESTRIL) 20 MG tablet 20 mg every evening.       methylPREDNISolone (MEDROL DOSEPACK) 4 mg tablet Take 4 mg by mouth. use as directed      oxyCODONE-acetaminophen (PERCOCET)  mg per tablet Take 1 tablet by mouth every 8 (eight) hours as needed for Pain. Sometimes doubles dose to cover pain      triamterene-hydrochlorothiazide 37.5-25 mg (DYAZIDE) 37.5-25 mg per capsule       albuterol (PROAIR HFA) 90 mcg/actuation inhaler Inhale 2 puffs into the lungs every 6 (six) hours as needed for Wheezing. Rescue      SUPREP BOWEL PREP KIT 17.5-3.13-1.6 gram SolR              Discharge Procedure Orders  Diet Cardiac     Activity as tolerated   Order Comments: Observe fall precautions.     Call MD for:   Order Comments: For worsening symptoms, chest pain, shortness of breath, increased abdominal pain, high grade fever, stroke or stroke like symptoms, immediately go to the nearest  Emergency Room or call 911 as soon as possible.       Follow-up Information     Jan Petersen MD In 1 week.    Specialty:  Family Medicine  Contact information:  [address] [facility]  [place] [address]  [phone]             Shon Garcia MD In 2 weeks.    Specialties:  General Surgery, Colon and Rectal Surgery, Surgery  Contact information:  [address] [facility]  [place] [address]  [phone]             Please follow up.    Contact information:  Avoid seeds, nuts and popcorn use.           Moris Roa MD.    Specialty:  Gastroenterology  Why:  as per planned  Contact information:  [address]  [place] [address]  [phone]

## 2018-02-28 NOTE — PHYSICIAN QUERY
PT Name: Nancy Muñoz  MR #: [id number]     Physician Query Form - Documentation Clarification      CDS/: Mary Lou Decker RN, CCDS               Contact information:  [email or web address]       This form is a permanent document in the medical record.     Query Date: February 28, 2018    By submitting this query, we are merely seeking further clarification of documentation. Please utilize your independent clinical judgment when addressing the question(s) below.    The Medical record reflects the following:    Supporting Clinical Findings Location in Medical Record   Patient reportedly has past medical history significant for recurrent acute diverticulitis (most recent episode one month ago, under care by Dr. Valera), hypertension, hyperlipidemia, CAD and chronic low back pain (under care by neurosurgeon in [place]).  Symptoms remind her of prior diverticulitis episode.    Lower abdominal pain   Diverticulitis    Case discussed with Dr. Petersen.  Obtain CT abdomen and pelvis with oral and IV contrast.  Start IV Zosyn and Falgyl.  Obtain Colonoscopy results from Dr. Valera.   Supportive care with PO narcotics and anti-emetics prn.    Diverticulosis  clear liquid diet    Diverticulosis    CT abdomen and pelvis:  1.  Descending and sigmoid colonic diverticulosis without evidence for diverticulitis.  2. Additional findings:  -Cholelithiasis. There is also a borderline dilated common bile duct for age (measuring up to 9 mm in diameter) to the level of the ampulla without identifiable obstructive etiology by CT. Correlation for potentially related symptoms and laboratory abnormalities is recommended.  -Bilateral, nonobstructive lower pole nephrolithiasis.    Diverticulitis   On IV Zosyn and Falgyl.    UTI  Recent diverticulitis on antibiotics    Postoperative diagnosis:  Acute cholecystitis.   Procedure:  Laparoscopic cholecystectomy. H & P 02/23          H & P 02/23      H & P 02/23            [facility] Medicine  progress note 02/24      Hospital Medicine progress note 02/25    Hospital Medicine progress note 02/26                Hospital Medicine progress note 02/26      Gastroenterology consult 02/26      Op note                                                                                      Doctor, Please specify diagnosis or diagnoses associated with above clinical findings.  Please further specify diverticulitis.    Provider Use Only       [   ] Ruled in:  Specify location:______________    [   ] Ruled out    [  x ] Other (specify) _____possible diverticulitis  _____                                                                                                                 [  ] Clinically undetermined

## 2018-02-28 NOTE — NURSING
Pt returned from surgery at approximately 1745, 4 lap sites to abdomen, 3 bandaid noted, 1to naval, 2 right lower quad with guaze underneath 1 bandaid, abdomen is slightly distended, soft, tender to touch, pt ambulated to bathroom, safety maintain

## 2018-02-28 NOTE — PHYSICIAN QUERY
PT Name: Nancy Muñoz  MR #: [id number]     Physician Query Form - Documentation Clarification      CDS/: Mary Lou Decker RN, CCDS               Contact information:  [email or web address]          This form is a permanent document in the medical record.     Query Date: February 28, 2018    By submitting this query, we are merely seeking further clarification of documentation. Please utilize your independent clinical judgment when addressing the question(s) below.    The Medical record reflects the following:    Supporting Clinical Findings Location in Medical Record   Patient reportedly has past medical history significant for recurrent acute diverticulitis (most recent episode one month ago, under care by Dr. Valera), hypertension, hyperlipidemia, CAD and chronic low back pain (under care by neurosurgeon in [place]).   Symptoms remind her of prior diverticulitis episode.     Lower abdominal pain  Diverticulitis    Case discussed with Dr. Petersen.  Obtain CT abdomen and pelvis with oral and IV contrast.  Start IV Zosyn and Falgyl.  Obtain Colonoscopy results from Dr. Valera.   Supportive care with PO narcotics and anti-emetics prn.    Lower abdominal pain  Diverticulosis    clear liquid diet    Lower abdominal pain  Diverticulosis    CT abdomen and pelvis:  1.  Descending and sigmoid colonic diverticulosis without evidence for diverticulitis.  2. Additional findings:  -Cholelithiasis. There is also a borderline dilated common bile duct for age (measuring up to 9 mm in diameter) to the level of the ampulla without identifiable obstructive etiology by CT. Correlation for potentially related symptoms and laboratory abnormalities is recommended.  -Bilateral, nonobstructive lower pole nephrolithiasis.    Lower abdominal pain  Diverticulitis     On IV Zosyn and Falgyl.    UTI    Recent diverticulitis on antibiotics    Postoperative diagnosis:  Acute cholecystitis.   Procedure:  Laparoscopic cholecystectomy. H & P  02/23          H & P 02/23      H & P 02/23            Hospital Medicine progress note 02/24      Hospital Medicine progress note 02/24    Hospital Medicine progress note 02/25      Hospital Medicine progress note 02/26                Hospital Medicine progress note 02/26      Hospital Medicine progress note 02/26    Gastroenterology consult 02/26    Gastroenterology consult 02/26    Op note                                                                                      Doctor, Please specify diagnosis or diagnoses associated with above clinical findings.  Please further specify diverticulosis.    Provider Use Only    [   ] Ruled in:  Specify location:_______Descending and sigmoid colonic diverticulosis_______    [   ] Ruled out    [   ] Other (specify) __________                                                                                                                     [  ] Clinically undetermined

## 2018-02-28 NOTE — PLAN OF CARE
Problem: Patient Care Overview  Goal: Plan of Care Review  Outcome: Ongoing (interventions implemented as appropriate)  Patient AAO, VSS.  IV fluids and abx infusing as ordered.  PRN pain medication given with moderate relief.  No complaints of N/V.  Patient free from falls and injury during shift.  Non skid socks on when OOB.  Bed in lowest position, brakes locked, and call light within reach.  Will continue to monitor.

## 2018-03-01 ENCOUNTER — PATIENT OUTREACH (OUTPATIENT)
Dept: ADMINISTRATIVE | Facility: CLINIC | Age: 63
End: 2018-03-01

## 2019-08-25 ENCOUNTER — HOSPITAL ENCOUNTER (EMERGENCY)
Facility: HOSPITAL | Age: 64
Discharge: ANOTHER HOSPITAL | End: 2019-08-25
Attending: EMERGENCY MEDICINE
Payer: COMMERCIAL

## 2019-08-25 ENCOUNTER — HOSPITAL ENCOUNTER (INPATIENT)
Facility: HOSPITAL | Age: 64
LOS: 4 days | Discharge: HOME | DRG: 251 | End: 2019-08-29
Attending: INTERNAL MEDICINE | Admitting: INTERNAL MEDICINE
Payer: COMMERCIAL

## 2019-08-25 VITALS
RESPIRATION RATE: 20 BRPM | TEMPERATURE: 99 F | SYSTOLIC BLOOD PRESSURE: 129 MMHG | HEART RATE: 78 BPM | HEIGHT: 63 IN | BODY MASS INDEX: 27.46 KG/M2 | WEIGHT: 155 LBS | DIASTOLIC BLOOD PRESSURE: 70 MMHG | OXYGEN SATURATION: 96 %

## 2019-08-25 DIAGNOSIS — I21.4 NSTEMI (NON-ST ELEVATED MYOCARDIAL INFARCTION): ICD-10-CM

## 2019-08-25 DIAGNOSIS — Z95.820 STATUS POST ANGIOPLASTY WITH STENT: ICD-10-CM

## 2019-08-25 DIAGNOSIS — R31.0 GROSS HEMATURIA: Primary | ICD-10-CM

## 2019-08-25 DIAGNOSIS — R06.02 SOB (SHORTNESS OF BREATH): ICD-10-CM

## 2019-08-25 DIAGNOSIS — R07.9 CHEST PAIN: ICD-10-CM

## 2019-08-25 DIAGNOSIS — I21.4 NSTEMI (NON-ST ELEVATED MYOCARDIAL INFARCTION): Primary | ICD-10-CM

## 2019-08-25 DIAGNOSIS — R07.89 CHEST TIGHTNESS: ICD-10-CM

## 2019-08-25 LAB
ALBUMIN SERPL BCP-MCNC: 4.6 G/DL (ref 3.5–5.2)
ALP SERPL-CCNC: 121 U/L (ref 55–135)
ALT SERPL W/O P-5'-P-CCNC: 27 U/L (ref 10–44)
ANION GAP SERPL CALC-SCNC: 10 MMOL/L (ref 8–16)
AST SERPL-CCNC: 28 U/L (ref 10–40)
BASOPHILS # BLD AUTO: 0.02 K/UL (ref 0–0.2)
BASOPHILS NFR BLD: 0.3 % (ref 0–1.9)
BILIRUB SERPL-MCNC: 0.8 MG/DL (ref 0.1–1)
BNP SERPL-MCNC: 53 PG/ML (ref 0–99)
BUN SERPL-MCNC: 27 MG/DL (ref 8–23)
CALCIUM SERPL-MCNC: 12 MG/DL (ref 8.7–10.5)
CHLORIDE SERPL-SCNC: 104 MMOL/L (ref 95–110)
CK MB SERPL-MCNC: 4.4 NG/ML (ref 0.1–6.5)
CK SERPL-CCNC: 106 U/L (ref 20–180)
CK SERPL-CCNC: 90 U/L (ref 20–180)
CO2 SERPL-SCNC: 26 MMOL/L (ref 23–29)
CREAT SERPL-MCNC: 0.9 MG/DL (ref 0.5–1.4)
DIFFERENTIAL METHOD: ABNORMAL
EOSINOPHIL # BLD AUTO: 0.1 K/UL (ref 0–0.5)
EOSINOPHIL NFR BLD: 0.9 % (ref 0–8)
ERYTHROCYTE [DISTWIDTH] IN BLOOD BY AUTOMATED COUNT: 11.7 % (ref 11.5–14.5)
EST. GFR  (AFRICAN AMERICAN): >60 ML/MIN/1.73 M^2
EST. GFR  (NON AFRICAN AMERICAN): >60 ML/MIN/1.73 M^2
GLUCOSE SERPL-MCNC: 83 MG/DL (ref 70–110)
HCT VFR BLD AUTO: 49.1 % (ref 37–48.5)
HGB BLD-MCNC: 17 G/DL (ref 12–16)
IMM GRANULOCYTES # BLD AUTO: 0.02 K/UL (ref 0–0.04)
LYMPHOCYTES # BLD AUTO: 2.5 K/UL (ref 1–4.8)
LYMPHOCYTES NFR BLD: 32.7 % (ref 18–48)
MCH RBC QN AUTO: 33 PG (ref 27–31)
MCHC RBC AUTO-ENTMCNC: 34.6 G/DL (ref 32–36)
MCV RBC AUTO: 95 FL (ref 82–98)
MONOCYTES # BLD AUTO: 0.8 K/UL (ref 0.3–1)
MONOCYTES NFR BLD: 10.9 % (ref 4–15)
NEUTROPHILS # BLD AUTO: 4.3 K/UL (ref 1.8–7.7)
NEUTROPHILS NFR BLD: 54.9 % (ref 38–73)
NRBC BLD-RTO: 0 /100 WBC
PLATELET # BLD AUTO: 199 K/UL (ref 150–350)
PMV BLD AUTO: 9.6 FL (ref 9.2–12.9)
POTASSIUM SERPL-SCNC: 4 MMOL/L (ref 3.5–5.1)
PROT SERPL-MCNC: 7.3 G/DL (ref 6–8.4)
RBC # BLD AUTO: 5.15 M/UL (ref 4–5.4)
SODIUM SERPL-SCNC: 140 MMOL/L (ref 136–145)
TROPONIN I SERPL DL<=0.01 NG/ML-MCNC: 2.67 NG/ML (ref 0.02–0.04)
TROPONIN I SERPL DL<=0.01 NG/ML-MCNC: 2.71 NG/ML (ref 0–0.03)
WBC # BLD AUTO: 7.73 K/UL (ref 3.9–12.7)

## 2019-08-25 PROCEDURE — 84484 ASSAY OF TROPONIN QUANT: CPT

## 2019-08-25 PROCEDURE — 63600175 PHARM REV CODE 636 W HCPCS: Performed by: EMERGENCY MEDICINE

## 2019-08-25 PROCEDURE — 83880 ASSAY OF NATRIURETIC PEPTIDE: CPT

## 2019-08-25 PROCEDURE — G0378 HOSPITAL OBSERVATION PER HR: HCPCS

## 2019-08-25 PROCEDURE — 21400001 HC TELEMETRY ROOM

## 2019-08-25 PROCEDURE — 25500020 PHARM REV CODE 255: Performed by: EMERGENCY MEDICINE

## 2019-08-25 PROCEDURE — 82553 CREATINE MB FRACTION: CPT | Mod: 91

## 2019-08-25 PROCEDURE — 93005 ELECTROCARDIOGRAM TRACING: CPT

## 2019-08-25 PROCEDURE — G0379 DIRECT REFER HOSPITAL OBSERV: HCPCS

## 2019-08-25 PROCEDURE — 94760 N-INVAS EAR/PLS OXIMETRY 1: CPT

## 2019-08-25 PROCEDURE — 36415 COLL VENOUS BLD VENIPUNCTURE: CPT

## 2019-08-25 PROCEDURE — 96361 HYDRATE IV INFUSION ADD-ON: CPT

## 2019-08-25 PROCEDURE — 63600175 PHARM REV CODE 636 W HCPCS: Performed by: INTERNAL MEDICINE

## 2019-08-25 PROCEDURE — S4991 NICOTINE PATCH NONLEGEND: HCPCS | Performed by: INTERNAL MEDICINE

## 2019-08-25 PROCEDURE — 99291 CRITICAL CARE FIRST HOUR: CPT | Mod: 25

## 2019-08-25 PROCEDURE — 96360 HYDRATION IV INFUSION INIT: CPT | Mod: 59

## 2019-08-25 PROCEDURE — 82550 ASSAY OF CK (CPK): CPT

## 2019-08-25 PROCEDURE — 25000003 PHARM REV CODE 250: Performed by: EMERGENCY MEDICINE

## 2019-08-25 PROCEDURE — 80053 COMPREHEN METABOLIC PANEL: CPT

## 2019-08-25 PROCEDURE — 85025 COMPLETE CBC W/AUTO DIFF WBC: CPT

## 2019-08-25 PROCEDURE — 25000003 PHARM REV CODE 250: Performed by: INTERNAL MEDICINE

## 2019-08-25 PROCEDURE — 84484 ASSAY OF TROPONIN QUANT: CPT | Mod: 91

## 2019-08-25 RX ORDER — IBUPROFEN 200 MG
16 TABLET ORAL
Status: DISCONTINUED | OUTPATIENT
Start: 2019-08-25 | End: 2019-08-29 | Stop reason: HOSPADM

## 2019-08-25 RX ORDER — ATORVASTATIN CALCIUM 10 MG/1
10 TABLET, FILM COATED ORAL DAILY
COMMUNITY

## 2019-08-25 RX ORDER — IBUPROFEN 200 MG
1 TABLET ORAL DAILY
Status: DISCONTINUED | OUTPATIENT
Start: 2019-08-25 | End: 2019-08-29 | Stop reason: HOSPADM

## 2019-08-25 RX ORDER — IBUPROFEN 200 MG
24 TABLET ORAL
Status: DISCONTINUED | OUTPATIENT
Start: 2019-08-25 | End: 2019-08-29 | Stop reason: HOSPADM

## 2019-08-25 RX ORDER — POTASSIUM CHLORIDE 20 MEQ/15ML
40 SOLUTION ORAL
Status: DISCONTINUED | OUTPATIENT
Start: 2019-08-25 | End: 2019-08-29 | Stop reason: HOSPADM

## 2019-08-25 RX ORDER — TRIAMTERENE/HYDROCHLOROTHIAZID 37.5-25 MG
1 TABLET ORAL EVERY MORNING
Status: DISCONTINUED | OUTPATIENT
Start: 2019-08-26 | End: 2019-08-29 | Stop reason: HOSPADM

## 2019-08-25 RX ORDER — ACETAMINOPHEN 325 MG/1
650 TABLET ORAL EVERY 4 HOURS PRN
Status: DISCONTINUED | OUTPATIENT
Start: 2019-08-25 | End: 2019-08-29 | Stop reason: HOSPADM

## 2019-08-25 RX ORDER — LANOLIN ALCOHOL/MO/W.PET/CERES
800 CREAM (GRAM) TOPICAL
Status: DISCONTINUED | OUTPATIENT
Start: 2019-08-25 | End: 2019-08-29 | Stop reason: HOSPADM

## 2019-08-25 RX ORDER — ATORVASTATIN CALCIUM 40 MG/1
40 TABLET, FILM COATED ORAL NIGHTLY
Status: DISCONTINUED | OUTPATIENT
Start: 2019-08-25 | End: 2019-08-29 | Stop reason: HOSPADM

## 2019-08-25 RX ORDER — ONDANSETRON 2 MG/ML
4 INJECTION INTRAMUSCULAR; INTRAVENOUS EVERY 8 HOURS PRN
Status: DISCONTINUED | OUTPATIENT
Start: 2019-08-25 | End: 2019-08-29 | Stop reason: HOSPADM

## 2019-08-25 RX ORDER — SODIUM CHLORIDE 0.9 % (FLUSH) 0.9 %
10 SYRINGE (ML) INJECTION
Status: DISCONTINUED | OUTPATIENT
Start: 2019-08-25 | End: 2019-08-29 | Stop reason: HOSPADM

## 2019-08-25 RX ORDER — SODIUM,POTASSIUM PHOSPHATES 280-250MG
2 POWDER IN PACKET (EA) ORAL
Status: DISCONTINUED | OUTPATIENT
Start: 2019-08-25 | End: 2019-08-29 | Stop reason: HOSPADM

## 2019-08-25 RX ORDER — ENOXAPARIN SODIUM 100 MG/ML
40 INJECTION SUBCUTANEOUS EVERY 24 HOURS
Status: DISCONTINUED | OUTPATIENT
Start: 2019-08-25 | End: 2019-08-27

## 2019-08-25 RX ORDER — POTASSIUM CHLORIDE 20 MEQ/15ML
60 SOLUTION ORAL
Status: DISCONTINUED | OUTPATIENT
Start: 2019-08-25 | End: 2019-08-29 | Stop reason: HOSPADM

## 2019-08-25 RX ORDER — RAMELTEON 8 MG/1
8 TABLET ORAL NIGHTLY PRN
Status: DISCONTINUED | OUTPATIENT
Start: 2019-08-25 | End: 2019-08-29 | Stop reason: HOSPADM

## 2019-08-25 RX ORDER — POLYETHYLENE GLYCOL 3350 17 G/17G
17 POWDER, FOR SOLUTION ORAL DAILY
Status: DISCONTINUED | OUTPATIENT
Start: 2019-08-26 | End: 2019-08-29 | Stop reason: HOSPADM

## 2019-08-25 RX ORDER — METAXALONE 800 MG/1
800 TABLET ORAL NIGHTLY
COMMUNITY
End: 2019-09-04

## 2019-08-25 RX ORDER — ACETAMINOPHEN 325 MG/1
650 TABLET ORAL EVERY 8 HOURS PRN
Status: DISCONTINUED | OUTPATIENT
Start: 2019-08-25 | End: 2019-08-29 | Stop reason: HOSPADM

## 2019-08-25 RX ORDER — OXYCODONE AND ACETAMINOPHEN 10; 325 MG/1; MG/1
1 TABLET ORAL 2 TIMES DAILY PRN
Status: DISCONTINUED | OUTPATIENT
Start: 2019-08-25 | End: 2019-08-29 | Stop reason: HOSPADM

## 2019-08-25 RX ORDER — ASPIRIN 325 MG
325 TABLET ORAL
Status: COMPLETED | OUTPATIENT
Start: 2019-08-25 | End: 2019-08-25

## 2019-08-25 RX ORDER — ASPIRIN 81 MG/1
81 TABLET ORAL DAILY
Status: DISCONTINUED | OUTPATIENT
Start: 2019-08-26 | End: 2019-08-29 | Stop reason: HOSPADM

## 2019-08-25 RX ADMIN — ATORVASTATIN CALCIUM 40 MG: 40 TABLET, FILM COATED ORAL at 09:08

## 2019-08-25 RX ADMIN — ENOXAPARIN SODIUM 40 MG: 100 INJECTION SUBCUTANEOUS at 09:08

## 2019-08-25 RX ADMIN — NITROGLYCERIN 0.5 INCH: 20 OINTMENT TOPICAL at 12:08

## 2019-08-25 RX ADMIN — NICOTINE 1 PATCH: 14 PATCH, EXTENDED RELEASE TRANSDERMAL at 09:08

## 2019-08-25 RX ADMIN — ASPIRIN 325 MG ORAL TABLET 325 MG: 325 PILL ORAL at 11:08

## 2019-08-25 RX ADMIN — IOHEXOL 100 ML: 350 INJECTION, SOLUTION INTRAVENOUS at 12:08

## 2019-08-25 RX ADMIN — SODIUM CHLORIDE 1000 ML: 0.9 INJECTION, SOLUTION INTRAVENOUS at 12:08

## 2019-08-25 NOTE — Clinical Note
Catheter is inserted into the ostium   right coronary artery. Angiography performed of the right coronary arteries. Angiography performed via power injection with 6 mL contrast at 3 mL/s.

## 2019-08-25 NOTE — ED PROVIDER NOTES
Encounter Date: 8/25/2019    SCRIBE #1 NOTE: Savanah OLIVEIRA, am scribing for, and in the presence of, Dr. Darrell Moralez.       History     Chief Complaint   Patient presents with    Shortness of Breath     with chest tightness. since thursday night       Time seen by provider: 11:08 AM on 08/25/2019    Nancy Muñoz is a 63 y.o. female who presents the ED with complaints of SOB since x3 days ago. The patient reports that she had an episode of neck pain, arm pain, chest rightness, vomiting secondary to abdominal pain, and SOB x3 nights ago. She mentions that she has been in bed for the past x3 days due to continued symptoms and new onset of numbness in fingers, bilateral shoulder pain, headache, and intermittent jaw pain. The patient denies cough, congestion, and fever. She denies any urinary symptoms. She is a current 1ppd smoker. She is currently on 10 mg of oxycodone x2 times a day. She denies history of blood clots but notes that she has a family history of heart problems. She denies any recent surgeries or long travels. The patient denies onset of any other symptoms at this time. PMHx of allergy, arthritis, asthma, CAD, hyperthyroidism, HTN, and lumbar radiculitis.    The history is provided by the patient.     Review of patient's allergies indicates:   Allergen Reactions    Fruit flavor Anaphylaxis     Garden apples     Past Medical History:   Diagnosis Date    Allergy     Arthritis     Asthma     Coronary artery disease     Hypertension     Hyperthyroidism     Lumbar radiculitis 1/30/2018     Past Surgical History:   Procedure Laterality Date    APPENDECTOMY      CHOLECYSTECTOMY-LAPAROSCOPIC N/A 2/27/2018    [facility]    INJECTION-STEROID-EPIDURAL-TRANSFORAMINAL Left 1/30/2018    [facility]    SPINE SURGERY      TONSILLECTOMY       History reviewed. No pertinent family history.  Social History     Tobacco Use    Smoking status: Current  Every Day Smoker     Packs/day: 0.50    Smokeless tobacco: Current User   Substance Use Topics    Alcohol use: Yes     Alcohol/week: 0.6 oz     Types: 1 Glasses of wine per week    Drug use: No     Review of Systems   Constitutional: Negative for activity change, appetite change, chills, fatigue and fever.   HENT: Negative for congestion.         + intermittent jaw pain   Eyes: Negative for visual disturbance.   Respiratory: Positive for shortness of breath. Negative for apnea and cough.    Cardiovascular: Positive for chest pain. Negative for palpitations.   Gastrointestinal: Negative for abdominal distention and abdominal pain.   Genitourinary: Negative for difficulty urinating, dysuria and hematuria.   Musculoskeletal: Positive for arthralgias (shoulders), myalgias (arms) and neck pain.   Skin: Negative for pallor and rash.   Neurological: Positive for numbness (fingers) and headaches.   Hematological: Does not bruise/bleed easily.   Psychiatric/Behavioral: Negative for agitation.       Physical Exam     Initial Vitals [08/25/19 1054]   BP Pulse Resp Temp SpO2   (!) 135/105 (!) 136 20 98.7 °F (37.1 °C) 98 %      MAP       --         Physical Exam    Nursing note and vitals reviewed.  Constitutional: She appears well-developed and well-nourished. She is not diaphoretic. No distress.   HENT:   Head: Normocephalic and atraumatic.   Eyes: EOM are normal. Pupils are equal, round, and reactive to light.   Neck: Normal range of motion. Neck supple.   Cardiovascular: Regular rhythm, normal heart sounds and intact distal pulses. Tachycardia present.  Exam reveals no gallop and no friction rub.    No murmur heard.  Pulmonary/Chest: Breath sounds normal. No respiratory distress. She has no wheezes. She has no rhonchi. She has no rales.   Equal, bilateral breath sounds noted without wheezing.    Abdominal: Soft. Bowel sounds are normal. There is no tenderness. There is no rebound and no guarding.   No palpable abdominal  tenderness noted.    Musculoskeletal: Normal range of motion.   Neurological: She is alert and oriented to person, place, and time.   Skin: Skin is warm and dry.   Psychiatric: She has a normal mood and affect. Her behavior is normal. Judgment and thought content normal.         ED Course   Critical Care  Performed by: Darrell Moralez MD  Authorized by: Darrell Moralez MD   Direct patient critical care time: 14 minutes  Ordering / reviewing critical care time: 11 minutes  Documentation critical care time: 12 minutes  Consulting other physicians critical care time: 16 minutes  Total critical care time (exclusive of procedural time) : 53 minutes  Critical care was time spent personally by me on the following activities: discussions with consultants, obtaining history from patient or surrogate, ordering and review of laboratory studies, development of treatment plan with patient or surrogate, ordering and review of radiographic studies and ordering and performing treatments and interventions.        Labs Reviewed   CBC W/ AUTO DIFFERENTIAL - Abnormal; Notable for the following components:       Result Value    Hemoglobin 17.0 (*)     Hematocrit 49.1 (*)     Mean Corpuscular Hemoglobin 33.0 (*)     All other components within normal limits   COMPREHENSIVE METABOLIC PANEL - Abnormal; Notable for the following components:    BUN, Bld 27 (*)     Calcium 12.0 (*)     All other components within normal limits   TROPONIN I - Abnormal; Notable for the following components:    Troponin I 2.709 (*)     All other components within normal limits   B-TYPE NATRIURETIC PEPTIDE     EKG Readings: (Independently Interpreted)   Initial Reading: No STEMI. Rhythm: Sinus Tachycardia. Heart Rate: 115 bpm.   Normal Axis. Normal Intervals. Biphasic T waves in lead 3 and AVF.       Imaging Results          CTA Chest Non-Coronary - PE Study (Final result)  Result time 08/25/19 13:16:53    Final result by Samir Smith MD (08/25/19 13:16:53)                  Impression:      1. No evidence of pulmonary artery thromboembolism.  No other acute abnormalities in the chest.      Electronically signed by: Samir Luis  Date:    08/25/2019  Time:    13:16             Narrative:    EXAMINATION:  CTA CHEST NON CORONARY    CLINICAL HISTORY:  Dyspnea, cardiac origin suspected;    TECHNIQUE:  Low dose axial images, sagittal and coronal reformations were obtained from the thoracic inlet to the lung bases following the IV administration of 100 mL of Omnipaque 350.  Contrast timing was optimized to evaluate the pulmonary arteries.  MIP images were performed.    COMPARISON:  CT abdomen and pelvis 02/23/2018.    FINDINGS:  Pulmonary Arteries: This study is adequate for the evaluation of pulmonary thromboembolism.  No evidence of pulmonary embolism to the level of the subsegmental arteries bilaterally.    Soft tissues of the neck:  Visualized portion of the thyroid is normal in appearance.    Thoracic aorta:  Normal in caliber and contour.    Heart: No cardiomegaly.  No pericardial effusion.    Lymph nodes:  No evidence of mediastinal, hilar, or axillary lymphadenopathy.    Trachea and bronchi: Patent.    Lungs:  Lungs are clear.  There is a 5 mm calcified nodule in the medial basal segment of the right lower lobe.  No focal consolidation.  No pleural effusion.  No pneumothorax.    Limited evaluation of the upper abdomen: Gallbladder has been surgically removed.  The common hepatic and bile ducts mildly prominent, likely related to the cholecystectomy status.  Several scattered punctate calcifications in the spleen, likely indicating prior granulomatous disease.  Left renal vascular calcifications noted.    Osseous structures:  No evidence of fractures or suspicious osseous lesions.  Partially-imaged postoperative changes with anterior cervical fusion in the lower cervical spine.  Multilevel degenerative changes of the thoracic spine noted.                                X-Ray Chest AP Portable (Final result)  Result time 08/25/19 11:39:33    Final result by Samir Smith MD (08/25/19 11:39:33)                 Impression:      No acute radiographic abnormality in the chest.      Electronically signed by: Samir Smith  Date:    08/25/2019  Time:    11:39             Narrative:    EXAMINATION:  XR CHEST AP PORTABLE    CLINICAL HISTORY:  Chest Pain;.    TECHNIQUE:  Single frontal portable view of the chest was performed.    COMPARISON:  None.    FINDINGS:  Cardiac monitor wires overlie the chest.  Cardiomediastinal silhouette is within normal limits.Lungs are clear.  No evidence of focal consolidation, pleural effusion, or pneumothorax.  Bones are intact.  Partially visualized anterior cervical fusion hardware.                                 Medical Decision Making:   History:   Old Medical Records: I decided to obtain old medical records.  Initial Assessment:   63-year-old female presented with chest pain and shortness of breath.  Differential Diagnosis:   Initial differential diagnosis included but not limited to Pulmonary embolism, anxiety, and acute MI.  Clinical Tests:   Lab Tests: Ordered and Reviewed  Radiological Study: Ordered and Reviewed  Medical Tests: Reviewed and Ordered  ED Management:  The patient was emergently evaluated in the emergency department, her evaluation was significant for an elderly female with a normal lung exam.  The patient's chest x-ray showed no acute abnormalities per my independent interpretation the patient's labs were concerning for an elevated troponin.  The patient was treated with an aspirin and nitro paste here in the emergency department.  The patient's CT scan of her chest showed evidence of a pulmonary embolism.  The patient has likely had an NSTEMI.  I discussed the case with our cardiologist on-call, Dr. Mendieta.  He reports that the patient should be transferred to an outside facility where the patient can have a cardiac catheterization.  I  discussed the case with the [facility], and physicians at [facility] (Dr. Pham, and Dr. Heard).  They have accepted the patient in transfer and she will be transferred there for further care.  Other:   I have discussed this case with another health care provider.            Scribe Attestation:   Scribe #1: I performed the above scribed service and the documentation accurately describes the services I performed. I attest to the accuracy of the note.           I, Dr. Darrell Moralez, personally performed the services described in this documentation. All medical record entries made by the scribe were at my direction and in my presence.  I have reviewed the chart and agree that the record reflects my personal performance and is accurate and complete. Darrell Moralez MD.  3:26 PM 08/25/2019       Clinical Impression:       ICD-10-CM ICD-9-CM   1. NSTEMI (non-ST elevated myocardial infarction) I21.4 410.70   2. Chest tightness R07.89 786.59   3. Chest pain R07.9 786.50   4. SOB (shortness of breath) R06.02 786.05         Disposition:   Disposition: Discharged  Condition: Stable                        Darrell Moralez MD  08/25/19 1526

## 2019-08-25 NOTE — H&P
[facility]  History & Physical    Patient Name: Nancy Muñoz  MRN: [id number]  Admission Date: 8/25/2019  Attending Physician: Artem Heard MD   Primary Care Provider: Jan Petersen MD         Patient information was obtained from patient and ER records.     Subjective:     Principal Problem:NSTEMI (non-ST elevated myocardial infarction)    Chief Complaint: No chief complaint on file.       HPI: 63-year-old white female transferred from [facility] ER with diagnosis of NSTEMI.  Patient has a known history of hypertension, hyperlipidemia, degenerative disc disease. The patient informed that she developed chest pressure associated with left shoulder brain, left jaw pain, left arm pain on Thursday.  Patient report by Friday she developed severe nausea and vomiting with upper abdominal pain. 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  Patient states she remained in bed Thursday Friday and Saturday.  Patient states on Friday 1 of her friend checked on her and which is min she described her symptom the friend suggested she may be having a heart attack.  Patient reports she go good the symptoms and she was surprised that she may be having a heart attack but she thought it might have passed already.  Today patient presented to the ER where her troponins were found to be at 2.7, her EKG showed Q-waves in lead 3 and AVF.  Her BNP was normal, her creatinine is normal.  ER also did the chest CTA which was negative for PE.  Patient was given aspirin and nitro paste.  Patient reports she does not have any chest pain or chest heaviness anymore.  The patient denies shortness of breath either.  Did discuss the findings and informed that we will admit her as observation.  And that I will contact Dr. Pham regarding the procedure if it is going to be scheduled for Monday or Tuesday.  Patient reports she is  very hungry and if she can eat.  Will start cardiac diet for now  Discussed will order echocardiogram tomorrow morning as well    Past Medical History:   Diagnosis Date    Allergy     Arthritis     Asthma     Coronary artery disease     Hypertension     Hyperthyroidism     Lumbar radiculitis 1/30/2018    NSTEMI (non-ST elevated myocardial infarction) 8/25/2019       Past Surgical History:   Procedure Laterality Date    APPENDECTOMY      CHOLECYSTECTOMY-LAPAROSCOPIC N/A 2/27/2018    Performed by [facility] at [facility] OR    INJECTION-STEROID-EPIDURAL-TRANSFORAMINAL Left 1/30/2018    [facility] OR    SPINE SURGERY      TONSILLECTOMY         Review of patient's allergies indicates:   Allergen Reactions    Fruit flavor Anaphylaxis     Pine apples       Current Facility-Administered Medications on File Prior to Encounter   Medication    [COMPLETED] aspirin tablet 325 mg    [COMPLETED] iohexol (OMNIPAQUE 350) injection 100 mL    [COMPLETED] nitroGLYCERIN 2% TD oint ointment 0.5 inch    [COMPLETED] sodium chloride 0.9% bolus 1,000 mL     Current Outpatient Medications on File Prior to Encounter   Medication Sig    albuterol (PROAIR HFA) 90 mcg/actuation inhaler Inhale 2 puffs into the lungs every 6 (six) hours as needed for Wheezing. Rescue    ciprofloxacin HCl (CIPRO) 500 MG tablet Take 1 tablet (500 mg total) by mouth every 12 (twelve) hours.    diphenhydrAMINE (BENADRYL) 25 mg capsule Take by mouth.    gabapentin (NEURONTIN) 300 MG capsule Take 1 capsule (300 mg total) by mouth 3 (three) times daily.    lisinopril (PRINIVIL,ZESTRIL) 20 MG tablet 20 mg every evening.     methylPREDNISolone (MEDROL DOSEPACK) 4 mg tablet Take 4 mg by mouth. use as directed    metroNIDAZOLE (FLAGYL) 500 MG tablet Take 1 tablet (500 mg total) by mouth every 8 (eight) hours.    oxyCODONE-acetaminophen (PERCOCET)  mg per tablet Take 1 tablet by mouth every 8 (eight) hours as needed  for Pain. Sometimes doubles dose to cover pain    SUPREP BOWEL PREP KIT 17.5-3.13-1.6 gram SolR     triamterene-hydrochlorothiazide 37.5-25 mg (DYAZIDE) 37.5-25 mg per capsule      Family History     None        Tobacco Use    Smoking status: Current Every Day Smoker     Packs/day: 0.50    Smokeless tobacco: Current User   Substance and Sexual Activity    Alcohol use: Yes     Alcohol/week: 0.6 oz     Types: 1 Glasses of wine per week    Drug use: No    Sexual activity: Yes     Review of Systems   Constitutional: Positive for fatigue. Negative for diaphoresis, fever and unexpected weight change.   HENT: Positive for congestion ( history of seasonal allergies), drooling and postnasal drip ( history of seasonal allergies). Negative for nosebleeds.         Left sided jaw pain Thursday   Eyes: Negative for pain and discharge.   Respiratory: Positive for chest tightness ( On Thursday, 08/22/2019 ) and shortness of breath ( on Thursday, 08/22/2019). Negative for apnea.    Cardiovascular: Negative for chest pain, palpitations and leg swelling.   Gastrointestinal: Positive for abdominal pain ( upper abdominal pain on Thursday 2219). Negative for nausea and vomiting.   Genitourinary: Negative.    Musculoskeletal: Positive for back pain ( chronic, multiple DDD) and neck pain ( chronic, DDD).   Skin: Negative.    Allergic/Immunologic: Positive for environmental allergies ( seasonal allergies).   Neurological: Negative for dizziness, seizures, speech difficulty, weakness and light-headedness.   Hematological: Negative.    Psychiatric/Behavioral: Negative.      Objective:     Vital Signs (Most Recent):  Temp: 98.5 °F (36.9 °C) (08/25/19 1613)  Pulse: 62 (08/25/19 1613)  Resp: 16 (08/25/19 1613)  BP: 114/67 (08/25/19 1613)  SpO2: 96 % (08/25/19 1613) Vital Signs (24h Range):  Temp:  [98.5 °F (36.9 °C)-98.7 °F (37.1 °C)] 98.5 °F (36.9 °C)  Pulse:  [] 62  Resp:  [16-20] 16  SpO2:  [86 %-98 %] 96 %  BP:  (110-148)/() 114/67     Weight: 70.5 kg (155 lb 6.8 oz)  Body mass index is 27.53 kg/m².    Physical Exam   Constitutional: She is oriented to person, place, and time. She appears well-developed and well-nourished. No distress.   HENT:   Head: Normocephalic and atraumatic.   Eyes: Pupils are equal, round, and reactive to light. Conjunctivae and EOM are normal. No scleral icterus.   Neck: Normal range of motion. Neck supple.   Cardiovascular: Normal rate, regular rhythm, normal heart sounds and intact distal pulses. Exam reveals no gallop and no friction rub.   No murmur heard.  Pulmonary/Chest: Effort normal and breath sounds normal.   Abdominal: Soft. Bowel sounds are normal.   Musculoskeletal: Normal range of motion. She exhibits no edema or deformity.   Neurological: She is alert and oriented to person, place, and time. No cranial nerve deficit.   Skin: Skin is warm and dry. No rash noted. She is not diaphoretic.   Psychiatric: She has a normal mood and affect. Her behavior is normal. Judgment and thought content normal.   Nursing note and vitals reviewed.        CRANIAL NERVES     CN III, IV, VI   Pupils are equal, round, and reactive to light.  Extraocular motions are normal.        Significant Labs:   CBC:   Recent Labs   Lab 08/25/19  1132   WBC 7.73   HGB 17.0*   HCT 49.1*        CMP:   Recent Labs   Lab 08/25/19  1132      K 4.0      CO2 26   GLU 83   BUN 27*   CREATININE 0.9   CALCIUM 12.0*   PROT 7.3   ALBUMIN 4.6   BILITOT 0.8   ALKPHOS 121   AST 28   ALT 27   ANIONGAP 10   EGFRNONAA >60     Magnesium: No results for input(s): MG in the last 48 hours.  Troponin:   Recent Labs   Lab 08/25/19  1132   TROPONINI 2.709*       Significant Imaging: I have reviewed all pertinent imaging results/findings within the past 24 hours.   CTA shows no evidence of pulmonary artery with thromboembolism.  No other acute abnormality in the chest    X-ray chest shows no acute radiographic abnormality  in the chest    Assessment/Plan:     * NSTEMI (non-ST elevated myocardial infarction)  NSTEMI, chest pain started Thursday 08/22/2019, patient presented to [facility] ER 08/25/2019  Cardiac monitoring  Pulse ox monitoring, keep O2 greater than 92%  Troponins 2.709(H)  The trend cardiac enzymes x2  Lovenox 40 mg subcu  Aspirin 81 mg daily  Continue atorvastatin 40 mg HS  Echo tomorrow morning  Possible angiogram Tuesday morning  Cardiac diet today  NPO after midnight Tuesday 08/27/2019    Hypertension  Chronic medical condition  Continue home meds        Patient Active Problem List   Diagnosis    Lumbar radiculitis    Diverticulitis    Lower abdominal pain    Hypertension    Hypercalcemia    Abnormal abdominal CT scan    Cholecystitis    NSTEMI (non-ST elevated myocardial infarction)       VTE Risk Mitigation (From admission, onward)        Ordered     enoxaparin injection 40 mg  Daily      08/25/19 1804     IP VTE HIGH RISK PATIENT  Once      08/25/19 1804             Artem Heard MD  Department of Hospital Medicine   [facility]

## 2019-08-25 NOTE — Clinical Note
Catheter is inserted into the aorta. Angiography performed of the left coronary arteries and right coronary arteries in multiple views. Angiography performed via power injection with 6 mL contrast at 3 mL/s.

## 2019-08-25 NOTE — SUBJECTIVE & OBJECTIVE
Past Medical History:   Diagnosis Date    Allergy     Arthritis     Asthma     Coronary artery disease     Hypertension     Hyperthyroidism     Lumbar radiculitis 1/30/2018    NSTEMI (non-ST elevated myocardial infarction) 8/25/2019       Past Surgical History:   Procedure Laterality Date    APPENDECTOMY      CHOLECYSTECTOMY-LAPAROSCOPIC N/A 2/27/2018    Performed by [facility] OR    INJECTION-STEROID-EPIDURAL-TRANSFORAMINAL Left 1/30/2018    Performed by Oziel Bustos [facility] at [facility] OR    SPINE SURGERY      TONSILLECTOMY         Review of patient's allergies indicates:   Allergen Reactions    Fruit flavor Anaphylaxis     Pine apples       Current Facility-Administered Medications on File Prior to Encounter   Medication    [COMPLETED] aspirin tablet 325 mg    [COMPLETED] iohexol (OMNIPAQUE 350) injection 100 mL    [COMPLETED] nitroGLYCERIN 2% TD oint ointment 0.5 inch    [COMPLETED] sodium chloride 0.9% bolus 1,000 mL     Current Outpatient Medications on File Prior to Encounter   Medication Sig    albuterol (PROAIR HFA) 90 mcg/actuation inhaler Inhale 2 puffs into the lungs every 6 (six) hours as needed for Wheezing. Rescue    ciprofloxacin HCl (CIPRO) 500 MG tablet Take 1 tablet (500 mg total) by mouth every 12 (twelve) hours.    diphenhydrAMINE (BENADRYL) 25 mg capsule Take by mouth.    gabapentin (NEURONTIN) 300 MG capsule Take 1 capsule (300 mg total) by mouth 3 (three) times daily.    lisinopril (PRINIVIL,ZESTRIL) 20 MG tablet 20 mg every evening.     methylPREDNISolone (MEDROL DOSEPACK) 4 mg tablet Take 4 mg by mouth. use as directed    metroNIDAZOLE (FLAGYL) 500 MG tablet Take 1 tablet (500 mg total) by mouth every 8 (eight) hours.    oxyCODONE-acetaminophen (PERCOCET)  mg per tablet Take 1 tablet by mouth every 8 (eight) hours as needed for Pain. Sometimes doubles dose to cover pain    SUPREP BOWEL PREP KIT 17.5-3.13-1.6 gram SolR      triamterene-hydrochlorothiazide 37.5-25 mg (DYAZIDE) 37.5-25 mg per capsule      Family History     None        Tobacco Use    Smoking status: Current Every Day Smoker     Packs/day: 0.50    Smokeless tobacco: Current User   Substance and Sexual Activity    Alcohol use: Yes     Alcohol/week: 0.6 oz     Types: 1 Glasses of wine per week    Drug use: No    Sexual activity: Yes     Review of Systems   Constitutional: Positive for fatigue. Negative for diaphoresis, fever and unexpected weight change.   HENT: Positive for congestion ( history of seasonal allergies), drooling and postnasal drip ( history of seasonal allergies). Negative for nosebleeds.         Left sided jaw pain Thursday   Eyes: Negative for pain and discharge.   Respiratory: Positive for chest tightness ( On Thursday, 08/22/2019 ) and shortness of breath ( on Thursday, 08/22/2019). Negative for apnea.    Cardiovascular: Negative for chest pain, palpitations and leg swelling.   Gastrointestinal: Positive for abdominal pain ( upper abdominal pain on Thursday 2219). Negative for nausea and vomiting.   Genitourinary: Negative.    Musculoskeletal: Positive for back pain ( chronic, multiple DDD) and neck pain ( chronic, DDD).   Skin: Negative.    Allergic/Immunologic: Positive for environmental allergies ( seasonal allergies).   Neurological: Negative for dizziness, seizures, speech difficulty, weakness and light-headedness.   Hematological: Negative.    Psychiatric/Behavioral: Negative.      Objective:     Vital Signs (Most Recent):  Temp: 98.5 °F (36.9 °C) (08/25/19 1613)  Pulse: 62 (08/25/19 1613)  Resp: 16 (08/25/19 1613)  BP: 114/67 (08/25/19 1613)  SpO2: 96 % (08/25/19 1613) Vital Signs (24h Range):  Temp:  [98.5 °F (36.9 °C)-98.7 °F (37.1 °C)] 98.5 °F (36.9 °C)  Pulse:  [] 62  Resp:  [16-20] 16  SpO2:  [86 %-98 %] 96 %  BP: (110-148)/() 114/67     Weight: 70.5 kg (155 lb 6.8 oz)  Body mass index is 27.53 kg/m².    Physical Exam    Constitutional: She is oriented to person, place, and time. She appears well-developed and well-nourished. No distress.   HENT:   Head: Normocephalic and atraumatic.   Eyes: Pupils are equal, round, and reactive to light. Conjunctivae and EOM are normal. No scleral icterus.   Neck: Normal range of motion. Neck supple.   Cardiovascular: Normal rate, regular rhythm, normal heart sounds and intact distal pulses. Exam reveals no gallop and no friction rub.   No murmur heard.  Pulmonary/Chest: Effort normal and breath sounds normal.   Abdominal: Soft. Bowel sounds are normal.   Musculoskeletal: Normal range of motion. She exhibits no edema or deformity.   Neurological: She is alert and oriented to person, place, and time. No cranial nerve deficit.   Skin: Skin is warm and dry. No rash noted. She is not diaphoretic.   Psychiatric: She has a normal mood and affect. Her behavior is normal. Judgment and thought content normal.   Nursing note and vitals reviewed.        CRANIAL NERVES     CN III, IV, VI   Pupils are equal, round, and reactive to light.  Extraocular motions are normal.        Significant Labs:   CBC:   Recent Labs   Lab 08/25/19  1132   WBC 7.73   HGB 17.0*   HCT 49.1*        CMP:   Recent Labs   Lab 08/25/19  1132      K 4.0      CO2 26   GLU 83   BUN 27*   CREATININE 0.9   CALCIUM 12.0*   PROT 7.3   ALBUMIN 4.6   BILITOT 0.8   ALKPHOS 121   AST 28   ALT 27   ANIONGAP 10   EGFRNONAA >60     Magnesium: No results for input(s): MG in the last 48 hours.  Troponin:   Recent Labs   Lab 08/25/19  1132   TROPONINI 2.709*       Significant Imaging: I have reviewed all pertinent imaging results/findings within the past 24 hours.   CTA shows no evidence of pulmonary artery with thromboembolism.  No other acute abnormality in the chest    X-ray chest shows no acute radiographic abnormality in the chest

## 2019-08-25 NOTE — ASSESSMENT & PLAN NOTE
NSTEMI, chest pain started Thursday 08/22/2019, patient presented to [facility] 08/25/2019  Cardiac monitoring  Pulse ox monitoring, keep O2 greater than 92%  Troponins 2.709(H)  The trend cardiac enzymes x2  Lovenox 40 mg subcu  Aspirin 81 mg daily  Continue atorvastatin 40 mg HS  Echo tomorrow morning  Possible angiogram Tuesday morning  Cardiac diet today  NPO after midnight Tuesday 08/27/2019

## 2019-08-25 NOTE — Clinical Note
100 ml injected throughout the case. 30 mL total wasted during the case. 130 mL total used in the case.

## 2019-08-25 NOTE — HPI
63-year-old white female transferred from [facility] ER with diagnosis of NSTEMI.  Patient has a known history of hypertension, hyperlipidemia, degenerative disc disease. The patient informed that she developed chest pressure associated with left shoulder brain, left jaw pain, left arm pain on Thursday.  Patient report by Friday she developed severe nausea and vomiting with upper abdominal pain. 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  Patient states she remained in bed Thursday Friday and Saturday.  Patient states on Friday 1 of her friend checked on her and which is min she described her symptom the friend suggested she may be having a heart attack.  Patient reports she go good the symptoms and she was surprised that she may be having a heart attack but she thought it might have passed already.  Today patient presented to the ER where her troponins were found to be at 2.7, her EKG showed Q-waves in lead 3 and AVF.  Her BNP was normal, her creatinine is normal.  ER also did the chest CTA which was negative for PE.  Patient was given aspirin and nitro paste.  Patient reports she does not have any chest pain or chest heaviness anymore.  The patient denies shortness of breath either.  Did discuss the findings and informed that we will admit her as observation.  And that I will contact Dr. Pham regarding the procedure if it is going to be scheduled for Monday or Tuesday.  Patient reports she is very hungry and if she can eat.  Will start cardiac diet for now  Discussed will order echocardiogram tomorrow morning as well

## 2019-08-25 NOTE — Clinical Note
Catheter is repositioned to the left ventricle.  Angiography performed via power injection with 20 mL contrast at 10 mL/s.

## 2019-08-25 NOTE — Clinical Note
Catheter is repositioned to the ostial  left coronary artery. Angiography performed of the left coronary arteries in multiple views.

## 2019-08-26 ENCOUNTER — CLINICAL SUPPORT (OUTPATIENT)
Dept: CARDIOLOGY | Facility: HOSPITAL | Age: 64
DRG: 251 | End: 2019-08-26
Attending: INTERNAL MEDICINE
Payer: COMMERCIAL

## 2019-08-26 LAB
ALBUMIN SERPL BCP-MCNC: 3.8 G/DL (ref 3.5–5.2)
ALP SERPL-CCNC: 85 U/L (ref 55–135)
ALT SERPL W/O P-5'-P-CCNC: 21 U/L (ref 10–44)
ANION GAP SERPL CALC-SCNC: 7 MMOL/L (ref 8–16)
AORTIC ROOT ANNULUS: 2.4 CM
AORTIC VALVE CUSP SEPERATION: 1.56 CM
AST SERPL-CCNC: 20 U/L (ref 10–40)
AV INDEX (PROSTH): 1.03
AV MEAN GRADIENT: 3 MMHG
AV PEAK GRADIENT: 4 MMHG
AV VALVE AREA: 3.23 CM2
AV VELOCITY RATIO: 91.72
BASOPHILS # BLD AUTO: 0.02 K/UL (ref 0–0.2)
BASOPHILS NFR BLD: 0.3 % (ref 0–1.9)
BILIRUB SERPL-MCNC: 1.5 MG/DL (ref 0.1–1)
BSA FOR ECHO PROCEDURE: 1.77 M2
BUN SERPL-MCNC: 20 MG/DL (ref 8–23)
CALCIUM SERPL-MCNC: 10.3 MG/DL (ref 8.7–10.5)
CHLORIDE SERPL-SCNC: 108 MMOL/L (ref 95–110)
CK MB SERPL-MCNC: 3.6 NG/ML (ref 0.1–6.5)
CO2 SERPL-SCNC: 28 MMOL/L (ref 23–29)
CREAT SERPL-MCNC: 0.7 MG/DL (ref 0.5–1.4)
CV ECHO LV RWT: 0.46 CM
DIFFERENTIAL METHOD: ABNORMAL
DOP CALC AO PEAK VEL: 1.05 M/S
DOP CALC AO VTI: 20.23 CM
DOP CALC LVOT AREA: 3.1 CM2
DOP CALC LVOT DIAMETER: 2 CM
DOP CALC LVOT PEAK VEL: 96.31 M/S
DOP CALC LVOT STROKE VOLUME: 65.31 CM3
DOP CALCLVOT PEAK VEL VTI: 20.8 CM
E WAVE DECELERATION TIME: 92.51 MSEC
E/A RATIO: 0.74
E/E' RATIO: 6.7 M/S
ECHO LV POSTERIOR WALL: 0.87 CM (ref 0.6–1.1)
EOSINOPHIL # BLD AUTO: 0.1 K/UL (ref 0–0.5)
EOSINOPHIL NFR BLD: 1.6 % (ref 0–8)
ERYTHROCYTE [DISTWIDTH] IN BLOOD BY AUTOMATED COUNT: 11.8 % (ref 11.5–14.5)
EST. GFR  (AFRICAN AMERICAN): >60 ML/MIN/1.73 M^2
EST. GFR  (NON AFRICAN AMERICAN): >60 ML/MIN/1.73 M^2
FRACTIONAL SHORTENING: 37 % (ref 28–44)
GLUCOSE SERPL-MCNC: 97 MG/DL (ref 70–110)
HCT VFR BLD AUTO: 44.2 % (ref 37–48.5)
HGB BLD-MCNC: 15.3 G/DL (ref 12–16)
IMM GRANULOCYTES # BLD AUTO: 0.01 K/UL (ref 0–0.04)
IMM GRANULOCYTES NFR BLD AUTO: 0.2 % (ref 0–0.5)
INR PPP: 1.1
INTERVENTRICULAR SEPTUM: 0.86 CM (ref 0.6–1.1)
LEFT ATRIUM SIZE: 2.38 CM
LEFT INTERNAL DIMENSION IN SYSTOLE: 2.39 CM (ref 2.1–4)
LEFT VENTRICLE MASS INDEX: 56 G/M2
LEFT VENTRICULAR INTERNAL DIMENSION IN DIASTOLE: 3.82 CM (ref 3.5–6)
LEFT VENTRICULAR MASS: 96.46 G
LV LATERAL E/E' RATIO: 5.58 M/S
LV SEPTAL E/E' RATIO: 8.38 M/S
LYMPHOCYTES # BLD AUTO: 2.2 K/UL (ref 1–4.8)
LYMPHOCYTES NFR BLD: 35.2 % (ref 18–48)
MAGNESIUM SERPL-MCNC: 1.8 MG/DL (ref 1.6–2.6)
MCH RBC QN AUTO: 32.9 PG (ref 27–31)
MCHC RBC AUTO-ENTMCNC: 34.6 G/DL (ref 32–36)
MCV RBC AUTO: 95 FL (ref 82–98)
MONOCYTES # BLD AUTO: 0.8 K/UL (ref 0.3–1)
MONOCYTES NFR BLD: 12 % (ref 4–15)
MV PEAK A VEL: 0.9 M/S
MV PEAK E VEL: 0.67 M/S
NEUTROPHILS # BLD AUTO: 3.2 K/UL (ref 1.8–7.7)
NEUTROPHILS NFR BLD: 50.7 % (ref 38–73)
NRBC BLD-RTO: 0 /100 WBC
PHOSPHATE SERPL-MCNC: 2.7 MG/DL (ref 2.7–4.5)
PLATELET # BLD AUTO: 196 K/UL (ref 150–350)
PMV BLD AUTO: 10 FL (ref 9.2–12.9)
POTASSIUM SERPL-SCNC: 3.6 MMOL/L (ref 3.5–5.1)
PROT SERPL-MCNC: 6.4 G/DL (ref 6–8.4)
PROTHROMBIN TIME: 13.9 SEC (ref 11.7–14)
PV PEAK VELOCITY: 110 CM/S
RBC # BLD AUTO: 4.65 M/UL (ref 4–5.4)
SODIUM SERPL-SCNC: 143 MMOL/L (ref 136–145)
TDI LATERAL: 0.12 M/S
TDI SEPTAL: 0.08 M/S
TDI: 0.1 M/S
TROPONIN I SERPL DL<=0.01 NG/ML-MCNC: 2.42 NG/ML (ref 0.02–0.04)
WBC # BLD AUTO: 6.25 K/UL (ref 3.9–12.7)

## 2019-08-26 PROCEDURE — 63600175 PHARM REV CODE 636 W HCPCS: Performed by: INTERNAL MEDICINE

## 2019-08-26 PROCEDURE — 93306 TTE W/DOPPLER COMPLETE: CPT

## 2019-08-26 PROCEDURE — 25000003 PHARM REV CODE 250: Performed by: PHYSICIAN ASSISTANT

## 2019-08-26 PROCEDURE — 83735 ASSAY OF MAGNESIUM: CPT

## 2019-08-26 PROCEDURE — 25000003 PHARM REV CODE 250: Performed by: INTERNAL MEDICINE

## 2019-08-26 PROCEDURE — 21400001 HC TELEMETRY ROOM

## 2019-08-26 PROCEDURE — S4991 NICOTINE PATCH NONLEGEND: HCPCS | Performed by: INTERNAL MEDICINE

## 2019-08-26 PROCEDURE — 36415 COLL VENOUS BLD VENIPUNCTURE: CPT

## 2019-08-26 PROCEDURE — 85025 COMPLETE CBC W/AUTO DIFF WBC: CPT

## 2019-08-26 PROCEDURE — 84100 ASSAY OF PHOSPHORUS: CPT

## 2019-08-26 PROCEDURE — 80053 COMPREHEN METABOLIC PANEL: CPT

## 2019-08-26 PROCEDURE — 85610 PROTHROMBIN TIME: CPT

## 2019-08-26 RX ADMIN — ASPIRIN 81 MG: 81 TABLET, COATED ORAL at 09:08

## 2019-08-26 RX ADMIN — ENOXAPARIN SODIUM 40 MG: 100 INJECTION SUBCUTANEOUS at 05:08

## 2019-08-26 RX ADMIN — ATORVASTATIN CALCIUM 40 MG: 40 TABLET, FILM COATED ORAL at 08:08

## 2019-08-26 RX ADMIN — METOPROLOL SUCCINATE 12.5 MG: 25 TABLET, EXTENDED RELEASE ORAL at 12:08

## 2019-08-26 RX ADMIN — TRIAMTERENE AND HYDROCHLOROTHIAZIDE 1 TABLET: 37.5; 25 TABLET ORAL at 09:08

## 2019-08-26 RX ADMIN — MAGNESIUM OXIDE 800 MG: 400 TABLET ORAL at 06:08

## 2019-08-26 RX ADMIN — POTASSIUM CHLORIDE 40 MEQ: 20 SOLUTION ORAL at 06:08

## 2019-08-26 RX ADMIN — NICOTINE 1 PATCH: 14 PATCH, EXTENDED RELEASE TRANSDERMAL at 09:08

## 2019-08-26 NOTE — CONSULTS
[facility]   Cardiology Note    Consult Requested By: Hospital Medicine  Reason for Consult: CP/NSTEMI     SUBJECTIVE:     History of Present Illness: this is a 63-year-old female with a past medical history of hypertension, hyperlipidemia, tobacco use, s/p cholecystectomy, and positive family history CAD who presented to the emergency department for evaluation of chest pain onset approximately 4 days ago. The patient states she was at home when she experienced acute midsternal burning discomfort with associated dyspnea on exertion. Over the next several days she experienced intermittent nausea/emesis, diaphoresis, and fatigue. She stated her symptoms worsened with exertion and was relieved following emesis episodes. She initially believed it was indigestion-like symptoms but was prompted to present to the emergency department when her friend notified her that these could be cardiac related symptoms. She's never had a cardiac workup or evaluation by cardiologist in the past. On admission, initial troponin 2.4. BNP 53. CT negative for pulmonary embolism. EKG initially sinus tachycardia heart rate 115 with inferior Q waves but repeat EKG demonstrated normal sinus rhythm heart rate 73 resolution of said Q waves. Chest x-ray negative for acute cardiopulmonary process. Vital signs stable at this time. Patient denies any active symptoms at rest but states she is still short of breath on exertion.    Review of patient's allergies indicates:   Allergen Reactions    Fruit flavor Anaphylaxis     Pine apples       Past Medical History:   Diagnosis Date    Allergy     Arthritis     Asthma     Coronary artery disease     Hypertension     Hyperthyroidism     Lumbar radiculitis 1/30/2018    NSTEMI (non-ST elevated myocardial infarction) 8/25/2019     Past Surgical History:   Procedure Laterality Date    APPENDECTOMY      CHOLECYSTECTOMY-LAPAROSCOPIC N/A 2/27/2018    Performed by [facility] at [facility]  OR    INJECTION-STEROID-EPIDURAL-TRANSFORAMINAL Left 1/30/2018    [facility] OR    SPINE SURGERY      TONSILLECTOMY       History reviewed. No pertinent family history.  Social History     Tobacco Use    Smoking status: Current Every Day Smoker     Packs/day: 0.50    Smokeless tobacco: Current User   Substance Use Topics    Alcohol use: Yes     Alcohol/week: 0.6 oz     Types: 1 Glasses of wine per week    Drug use: No       Review of Systems:  Review of Systems   Respiratory: Positive for shortness of breath.    Cardiovascular: Positive for chest pain.   Gastrointestinal: Positive for nausea and vomiting.   All other systems reviewed and are negative.      OBJECTIVE:     Vital Signs (Most Recent)  Temp: 98.6 °F (37 °C) (08/26/19 0721)  Pulse: 75 (08/26/19 0721)  Resp: 16 (08/26/19 0721)  BP: 125/74 (08/26/19 0721)  SpO2: 96 % (08/26/19 0721)    Vital Signs Range (Last 24H):  Temp:  [97.8 °F (36.6 °C)-98.7 °F (37.1 °C)]   Pulse:  []   Resp:  [12-20]   BP: ()/()   SpO2:  [86 %-98 %]     I & O (Last 24H):  No intake or output data in the 24 hours ending 08/26/19 0942    Current Diet:     Current Diet Order   Procedures    Diet Cardiac [facility]     Order Specific Question:   Indicate patient location for additional diet options:     Answer:   [facility]    Diet NPO        Allergies:  Review of patient's allergies indicates:   Allergen Reactions    Fruit flavor Anaphylaxis     Pine apples       Meds:  Scheduled Meds:   aspirin  81 mg Oral Daily    atorvastatin  40 mg Oral QHS    enoxaparin  40 mg Subcutaneous Daily    nicotine  1 patch Transdermal Daily    polyethylene glycol  17 g Oral Daily    triamterene-hydrochlorothiazide 37.5-25 mg  1 tablet Oral QAM     Continuous Infusions:  PRN Meds:acetaminophen, acetaminophen, glucose, glucose, magnesium oxide, magnesium oxide, ondansetron, oxyCODONE-acetaminophen, potassium chloride 10%, potassium chloride 10%, potassium chloride  10%, potassium, sodium phosphates, potassium, sodium phosphates, potassium, sodium phosphates, promethazine (PHENERGAN) IVPB, ramelteon, sodium chloride 0.9%         Laboratory and Radiology Data:  Recent Results (from the past 24 hour(s))   CBC auto differential    Collection Time: 08/25/19 11:32 AM   Result Value Ref Range    WBC 7.73 3.90 - 12.70 K/uL    RBC 5.15 4.00 - 5.40 M/uL    Hemoglobin 17.0 (H) 12.0 - 16.0 g/dL    Hematocrit 49.1 (H) 37.0 - 48.5 %    Mean Corpuscular Volume 95 82 - 98 fL    Mean Corpuscular Hemoglobin 33.0 (H) 27.0 - 31.0 pg    Mean Corpuscular Hemoglobin Conc 34.6 32.0 - 36.0 g/dL    RDW 11.7 11.5 - 14.5 %    Platelets 199 150 - 350 K/uL    MPV 9.6 9.2 - 12.9 fL    Gran # (ANC) 4.3 1.8 - 7.7 K/uL    Immature Grans (Abs) 0.02 0.00 - 0.04 K/uL    Lymph # 2.5 1.0 - 4.8 K/uL    Mono # 0.8 0.3 - 1.0 K/uL    Eos # 0.1 0.0 - 0.5 K/uL    Baso # 0.02 0.00 - 0.20 K/uL    nRBC 0 0 /100 WBC    Gran% 54.9 38.0 - 73.0 %    Lymph% 32.7 18.0 - 48.0 %    Mono% 10.9 4.0 - 15.0 %    Eosinophil% 0.9 0.0 - 8.0 %    Basophil% 0.3 0.0 - 1.9 %    Differential Method Automated    Comprehensive metabolic panel    Collection Time: 08/25/19 11:32 AM   Result Value Ref Range    Sodium 140 136 - 145 mmol/L    Potassium 4.0 3.5 - 5.1 mmol/L    Chloride 104 95 - 110 mmol/L    CO2 26 23 - 29 mmol/L    Glucose 83 70 - 110 mg/dL    BUN, Bld 27 (H) 8 - 23 mg/dL    Creatinine 0.9 0.5 - 1.4 mg/dL    Calcium 12.0 (H) 8.7 - 10.5 mg/dL    Total Protein 7.3 6.0 - 8.4 g/dL    Albumin 4.6 3.5 - 5.2 g/dL    Total Bilirubin 0.8 0.1 - 1.0 mg/dL    Alkaline Phosphatase 121 55 - 135 U/L    AST 28 10 - 40 U/L    ALT 27 10 - 44 U/L    Anion Gap 10 8 - 16 mmol/L    eGFR if African American >60 >60 mL/min/1.73 m^2    eGFR if non African American >60 >60 mL/min/1.73 m^2   Troponin I #1    Collection Time: 08/25/19 11:32 AM   Result Value Ref Range    Troponin I 2.709 (H) 0.000 - 0.026 ng/mL   B-Type natriuretic peptide (BNP)    Collection  Time: 08/25/19 11:32 AM   Result Value Ref Range    BNP 53 0 - 99 pg/mL   CK    Collection Time: 08/25/19  6:45 PM   Result Value Ref Range     20 - 180 U/L   CK-MB    Collection Time: 08/25/19  6:45 PM   Result Value Ref Range    CPK MB 4.4 0.1 - 6.5 ng/mL   Troponin I    Collection Time: 08/25/19  6:45 PM   Result Value Ref Range    Troponin I 2.670 (HH) 0.020 - 0.040 ng/mL   CK    Collection Time: 08/25/19 11:08 PM   Result Value Ref Range    CPK 90 20 - 180 U/L   CK-MB    Collection Time: 08/25/19 11:08 PM   Result Value Ref Range    CPK MB 3.6 0.1 - 6.5 ng/mL   Troponin I    Collection Time: 08/25/19 11:09 PM   Result Value Ref Range    Troponin I 2.418 (HH) 0.020 - 0.040 ng/mL   Comprehensive Metabolic Panel (CMP)    Collection Time: 08/26/19  4:47 AM   Result Value Ref Range    Sodium 143 136 - 145 mmol/L    Potassium 3.6 3.5 - 5.1 mmol/L    Chloride 108 95 - 110 mmol/L    CO2 28 23 - 29 mmol/L    Glucose 97 70 - 110 mg/dL    BUN, Bld 20 8 - 23 mg/dL    Creatinine 0.7 0.5 - 1.4 mg/dL    Calcium 10.3 8.7 - 10.5 mg/dL    Total Protein 6.4 6.0 - 8.4 g/dL    Albumin 3.8 3.5 - 5.2 g/dL    Total Bilirubin 1.5 (H) 0.1 - 1.0 mg/dL    Alkaline Phosphatase 85 55 - 135 U/L    AST 20 10 - 40 U/L    ALT 21 10 - 44 U/L    Anion Gap 7 (L) 8 - 16 mmol/L    eGFR if African American >60.0 >60 mL/min/1.73 m^2    eGFR if non African American >60.0 >60 mL/min/1.73 m^2   Magnesium    Collection Time: 08/26/19  4:47 AM   Result Value Ref Range    Magnesium 1.8 1.6 - 2.6 mg/dL   Phosphorus    Collection Time: 08/26/19  4:47 AM   Result Value Ref Range    Phosphorus 2.7 2.7 - 4.5 mg/dL   CBC with Automated Differential    Collection Time: 08/26/19  4:47 AM   Result Value Ref Range    WBC 6.25 3.90 - 12.70 K/uL    RBC 4.65 4.00 - 5.40 M/uL    Hemoglobin 15.3 12.0 - 16.0 g/dL    Hematocrit 44.2 37.0 - 48.5 %    Mean Corpuscular Volume 95 82 - 98 fL    Mean Corpuscular Hemoglobin 32.9 (H) 27.0 - 31.0 pg    Mean Corpuscular  Hemoglobin Conc 34.6 32.0 - 36.0 g/dL    RDW 11.8 11.5 - 14.5 %    Platelets 196 150 - 350 K/uL    MPV 10.0 9.2 - 12.9 fL    Immature Granulocytes 0.2 0.0 - 0.5 %    Gran # (ANC) 3.2 1.8 - 7.7 K/uL    Immature Grans (Abs) 0.01 0.00 - 0.04 K/uL    Lymph # 2.2 1.0 - 4.8 K/uL    Mono # 0.8 0.3 - 1.0 K/uL    Eos # 0.1 0.0 - 0.5 K/uL    Baso # 0.02 0.00 - 0.20 K/uL    nRBC 0 0 /100 WBC    Gran% 50.7 38.0 - 73.0 %    Lymph% 35.2 18.0 - 48.0 %    Mono% 12.0 4.0 - 15.0 %    Eosinophil% 1.6 0.0 - 8.0 %    Basophil% 0.3 0.0 - 1.9 %    Differential Method Automated    PT/INR    Collection Time: 08/26/19  4:47 AM   Result Value Ref Range    PT 13.9 11.7 - 14.0 sec    INR 1.1          Physical Exam:   Physical Exam   Constitutional: She is well-developed, well-nourished, and in no distress. No distress.   Eyes: Pupils are equal, round, and reactive to light.   Neck: Neck supple. No JVD present.   Cardiovascular: Normal rate and regular rhythm.   No murmur heard.  Pulmonary/Chest: Effort normal and breath sounds normal. No respiratory distress. She has no wheezes. She has no rales.   Abdominal: Soft. Bowel sounds are normal.   Skin: She is not diaphoretic.       ASSESSMENT/PLAN:   Assessment:   NSTEMI  HTN  HLD  Tobacco use 1 PPD  FamHx CAD  S/P cholecystectomy    Plan:   [facility] tomorrow AM Dr. Pham. NPO midnight tonight. R/B/As discussed, consents obtained.   Continue ASA, Lovenox, statin. Start Toprol XL 12.5 mg daily.   SLNTG PRN for CP.     Jude Guerra PA-C  08/26/19

## 2019-08-26 NOTE — PLAN OF CARE
Problem: Adult Inpatient Plan of Care  Goal: Plan of Care Review  Outcome: Ongoing (interventions implemented as appropriate)  Pt educated on meds, treatment, safety precautions, smoking cesatation, s/sx of Heart attack to report; pt verbalizes understanding.Pt educated on Angio to be performed in AM; NPO after midnight. Pt verbalizes understanding. Pt resting with pleasant affect and no c/o pain throughout shift;

## 2019-08-26 NOTE — PLAN OF CARE
08/25/19 9453   Patient Assessment/Suction   Level of Consciousness (AVPU) alert   PRE-TX-O2   O2 Device (Oxygen Therapy) room air   SpO2 96 %   Pulse Oximetry Type Intermittent   Pulse 90   Resp 12   d/c from respiratory services/pt. Denies sob or chest pain o2 sats in the high 90's

## 2019-08-26 NOTE — H&P (VIEW-ONLY)
[facility]   Cardiology Note    Consult Requested By: Hospital Medicine  Reason for Consult: CP/NSTEMI     SUBJECTIVE:     History of Present Illness: this is a 63-year-old female with a past medical history of hypertension, hyperlipidemia, tobacco use, s/p cholecystectomy, and positive family history CAD who presented to the emergency department for evaluation of chest pain onset approximately 4 days ago. The patient states she was at home when she experienced acute midsternal burning discomfort with associated dyspnea on exertion. Over the next several days she experienced intermittent nausea/emesis, diaphoresis, and fatigue. She stated her symptoms worsened with exertion and was relieved following emesis episodes. She initially believed it was indigestion-like symptoms but was prompted to present to the emergency department when her friend notified her that these could be cardiac related symptoms. She's never had a cardiac workup or evaluation by cardiologist in the past. On admission, initial troponin 2.4. BNP 53. CT negative for pulmonary embolism. EKG initially sinus tachycardia heart rate 115 with inferior Q waves but repeat EKG demonstrated normal sinus rhythm heart rate 73 resolution of said Q waves. Chest x-ray negative for acute cardiopulmonary process. Vital signs stable at this time. Patient denies any active symptoms at rest but states she is still short of breath on exertion.    Review of patient's allergies indicates:   Allergen Reactions    Fruit flavor Anaphylaxis     Pine apples       Past Medical History:   Diagnosis Date    Allergy     Arthritis     Asthma     Coronary artery disease     Hypertension     Hyperthyroidism     Lumbar radiculitis 1/30/2018    NSTEMI (non-ST elevated myocardial infarction) 8/25/2019     Past Surgical History:   Procedure Laterality Date    APPENDECTOMY      CHOLECYSTECTOMY-LAPAROSCOPIC N/A 2/27/2018    Performed by Shon [facility] at [facility]  OR    INJECTION-STEROID-EPIDURAL-TRANSFORAMINAL Left 1/30/2018    Performed by [facility]iel [facility]os [facility] at [facility] OR    SPINE SURGERY      TONSILLECTOMY       History reviewed. No pertinent family history.  Social History     Tobacco Use    Smoking status: Current Every Day Smoker     Packs/day: 0.50    Smokeless tobacco: Current User   Substance Use Topics    Alcohol use: Yes     Alcohol/week: 0.6 oz     Types: 1 Glasses of wine per week    Drug use: No       Review of Systems:  Review of Systems   Respiratory: Positive for shortness of breath.    Cardiovascular: Positive for chest pain.   Gastrointestinal: Positive for nausea and vomiting.   All other systems reviewed and are negative.      OBJECTIVE:     Vital Signs (Most Recent)  Temp: 98.6 °F (37 °C) (08/26/19 0721)  Pulse: 75 (08/26/19 0721)  Resp: 16 (08/26/19 0721)  BP: 125/74 (08/26/19 0721)  SpO2: 96 % (08/26/19 0721)    Vital Signs Range (Last 24H):  Temp:  [97.8 °F (36.6 °C)-98.7 °F (37.1 °C)]   Pulse:  []   Resp:  [12-20]   BP: ()/()   SpO2:  [86 %-98 %]     I & O (Last 24H):  No intake or output data in the 24 hours ending 08/26/19 0942    Current Diet:     Current Diet Order   Procedures    Diet Cardiac [facility]     Order Specific Question:   Indicate patient location for additional diet options:     Answer:   [facility]    Diet NPO        Allergies:  Review of patient's allergies indicates:   Allergen Reactions    Fruit flavor Anaphylaxis     Pine apples       Meds:  Scheduled Meds:   aspirin  81 mg Oral Daily    atorvastatin  40 mg Oral QHS    enoxaparin  40 mg Subcutaneous Daily    nicotine  1 patch Transdermal Daily    polyethylene glycol  17 g Oral Daily    triamterene-hydrochlorothiazide 37.5-25 mg  1 tablet Oral QAM     Continuous Infusions:  PRN Meds:acetaminophen, acetaminophen, glucose, glucose, magnesium oxide, magnesium oxide, ondansetron, oxyCODONE-acetaminophen, potassium chloride 10%, potassium chloride 10%, potassium chloride  10%, potassium, sodium phosphates, potassium, sodium phosphates, potassium, sodium phosphates, promethazine (PHENERGAN) IVPB, ramelteon, sodium chloride 0.9%         Laboratory and Radiology Data:  Recent Results (from the past 24 hour(s))   CBC auto differential    Collection Time: 08/25/19 11:32 AM   Result Value Ref Range    WBC 7.73 3.90 - 12.70 K/uL    RBC 5.15 4.00 - 5.40 M/uL    Hemoglobin 17.0 (H) 12.0 - 16.0 g/dL    Hematocrit 49.1 (H) 37.0 - 48.5 %    Mean Corpuscular Volume 95 82 - 98 fL    Mean Corpuscular Hemoglobin 33.0 (H) 27.0 - 31.0 pg    Mean Corpuscular Hemoglobin Conc 34.6 32.0 - 36.0 g/dL    RDW 11.7 11.5 - 14.5 %    Platelets 199 150 - 350 K/uL    MPV 9.6 9.2 - 12.9 fL    Gran # (ANC) 4.3 1.8 - 7.7 K/uL    Immature Grans (Abs) 0.02 0.00 - 0.04 K/uL    Lymph # 2.5 1.0 - 4.8 K/uL    Mono # 0.8 0.3 - 1.0 K/uL    Eos # 0.1 0.0 - 0.5 K/uL    Baso # 0.02 0.00 - 0.20 K/uL    nRBC 0 0 /100 WBC    Gran% 54.9 38.0 - 73.0 %    Lymph% 32.7 18.0 - 48.0 %    Mono% 10.9 4.0 - 15.0 %    Eosinophil% 0.9 0.0 - 8.0 %    Basophil% 0.3 0.0 - 1.9 %    Differential Method Automated    Comprehensive metabolic panel    Collection Time: 08/25/19 11:32 AM   Result Value Ref Range    Sodium 140 136 - 145 mmol/L    Potassium 4.0 3.5 - 5.1 mmol/L    Chloride 104 95 - 110 mmol/L    CO2 26 23 - 29 mmol/L    Glucose 83 70 - 110 mg/dL    BUN, Bld 27 (H) 8 - 23 mg/dL    Creatinine 0.9 0.5 - 1.4 mg/dL    Calcium 12.0 (H) 8.7 - 10.5 mg/dL    Total Protein 7.3 6.0 - 8.4 g/dL    Albumin 4.6 3.5 - 5.2 g/dL    Total Bilirubin 0.8 0.1 - 1.0 mg/dL    Alkaline Phosphatase 121 55 - 135 U/L    AST 28 10 - 40 U/L    ALT 27 10 - 44 U/L    Anion Gap 10 8 - 16 mmol/L    eGFR if African American >60 >60 mL/min/1.73 m^2    eGFR if non African American >60 >60 mL/min/1.73 m^2   Troponin I #1    Collection Time: 08/25/19 11:32 AM   Result Value Ref Range    Troponin I 2.709 (H) 0.000 - 0.026 ng/mL   B-Type natriuretic peptide (BNP)    Collection  Time: 08/25/19 11:32 AM   Result Value Ref Range    BNP 53 0 - 99 pg/mL   CK    Collection Time: 08/25/19  6:45 PM   Result Value Ref Range     20 - 180 U/L   CK-MB    Collection Time: 08/25/19  6:45 PM   Result Value Ref Range    CPK MB 4.4 0.1 - 6.5 ng/mL   Troponin I    Collection Time: 08/25/19  6:45 PM   Result Value Ref Range    Troponin I 2.670 (HH) 0.020 - 0.040 ng/mL   CK    Collection Time: 08/25/19 11:08 PM   Result Value Ref Range    CPK 90 20 - 180 U/L   CK-MB    Collection Time: 08/25/19 11:08 PM   Result Value Ref Range    CPK MB 3.6 0.1 - 6.5 ng/mL   Troponin I    Collection Time: 08/25/19 11:09 PM   Result Value Ref Range    Troponin I 2.418 (HH) 0.020 - 0.040 ng/mL   Comprehensive Metabolic Panel (CMP)    Collection Time: 08/26/19  4:47 AM   Result Value Ref Range    Sodium 143 136 - 145 mmol/L    Potassium 3.6 3.5 - 5.1 mmol/L    Chloride 108 95 - 110 mmol/L    CO2 28 23 - 29 mmol/L    Glucose 97 70 - 110 mg/dL    BUN, Bld 20 8 - 23 mg/dL    Creatinine 0.7 0.5 - 1.4 mg/dL    Calcium 10.3 8.7 - 10.5 mg/dL    Total Protein 6.4 6.0 - 8.4 g/dL    Albumin 3.8 3.5 - 5.2 g/dL    Total Bilirubin 1.5 (H) 0.1 - 1.0 mg/dL    Alkaline Phosphatase 85 55 - 135 U/L    AST 20 10 - 40 U/L    ALT 21 10 - 44 U/L    Anion Gap 7 (L) 8 - 16 mmol/L    eGFR if African American >60.0 >60 mL/min/1.73 m^2    eGFR if non African American >60.0 >60 mL/min/1.73 m^2   Magnesium    Collection Time: 08/26/19  4:47 AM   Result Value Ref Range    Magnesium 1.8 1.6 - 2.6 mg/dL   Phosphorus    Collection Time: 08/26/19  4:47 AM   Result Value Ref Range    Phosphorus 2.7 2.7 - 4.5 mg/dL   CBC with Automated Differential    Collection Time: 08/26/19  4:47 AM   Result Value Ref Range    WBC 6.25 3.90 - 12.70 K/uL    RBC 4.65 4.00 - 5.40 M/uL    Hemoglobin 15.3 12.0 - 16.0 g/dL    Hematocrit 44.2 37.0 - 48.5 %    Mean Corpuscular Volume 95 82 - 98 fL    Mean Corpuscular Hemoglobin 32.9 (H) 27.0 - 31.0 pg    Mean Corpuscular  Hemoglobin Conc 34.6 32.0 - 36.0 g/dL    RDW 11.8 11.5 - 14.5 %    Platelets 196 150 - 350 K/uL    MPV 10.0 9.2 - 12.9 fL    Immature Granulocytes 0.2 0.0 - 0.5 %    Gran # (ANC) 3.2 1.8 - 7.7 K/uL    Immature Grans (Abs) 0.01 0.00 - 0.04 K/uL    Lymph # 2.2 1.0 - 4.8 K/uL    Mono # 0.8 0.3 - 1.0 K/uL    Eos # 0.1 0.0 - 0.5 K/uL    Baso # 0.02 0.00 - 0.20 K/uL    nRBC 0 0 /100 WBC    Gran% 50.7 38.0 - 73.0 %    Lymph% 35.2 18.0 - 48.0 %    Mono% 12.0 4.0 - 15.0 %    Eosinophil% 1.6 0.0 - 8.0 %    Basophil% 0.3 0.0 - 1.9 %    Differential Method Automated    PT/INR    Collection Time: 08/26/19  4:47 AM   Result Value Ref Range    PT 13.9 11.7 - 14.0 sec    INR 1.1          Physical Exam:   Physical Exam   Constitutional: She is well-developed, well-nourished, and in no distress. No distress.   Eyes: Pupils are equal, round, and reactive to light.   Neck: Neck supple. No JVD present.   Cardiovascular: Normal rate and regular rhythm.   No murmur heard.  Pulmonary/Chest: Effort normal and breath sounds normal. No respiratory distress. She has no wheezes. She has no rales.   Abdominal: Soft. Bowel sounds are normal.   Skin: She is not diaphoretic.       ASSESSMENT/PLAN:   Assessment:   NSTEMI  HTN  HLD  Tobacco use 1 PPD  FamHx CAD  S/P cholecystectomy    Plan:   [facility] tomorrow AM Dr. Pham. NPO midnight tonight. R/B/As discussed, consents obtained.   Continue ASA, Lovenox, statin. Start Toprol XL 12.5 mg daily.   SLNTG PRN for CP.     Jude Guerra PA-C  08/26/19

## 2019-08-26 NOTE — PROGRESS NOTES
Cardiac Rehab     Nancy RICH Muñoz   [id number]   8/26/2019         Cardiac Rehab Phase Taught: Phase 1    Teaching Method: Verbal, Written    Handouts: Cardiac Rehab Tear Sheet, Heart Attack Booklet and Smoking Cessation    Educational Videos: None    Understanding:  Learning indicated by feedback and Verbalize understanding    Comments: Education on angiogram, Nstemi and cardiac rehab            Minnie Glez RN

## 2019-08-27 LAB
ALBUMIN SERPL BCP-MCNC: 4.4 G/DL (ref 3.5–5.2)
ALP SERPL-CCNC: 98 U/L (ref 55–135)
ALT SERPL W/O P-5'-P-CCNC: 21 U/L (ref 10–44)
ANION GAP SERPL CALC-SCNC: 10 MMOL/L (ref 8–16)
ANION GAP SERPL CALC-SCNC: 10 MMOL/L (ref 8–16)
AST SERPL-CCNC: 19 U/L (ref 10–40)
BASOPHILS # BLD AUTO: 0.02 K/UL (ref 0–0.2)
BASOPHILS NFR BLD: 0.2 % (ref 0–1.9)
BILIRUB SERPL-MCNC: 1.6 MG/DL (ref 0.1–1)
BUN SERPL-MCNC: 27 MG/DL (ref 8–23)
BUN SERPL-MCNC: 27 MG/DL (ref 8–23)
CALCIUM SERPL-MCNC: 10.9 MG/DL (ref 8.7–10.5)
CALCIUM SERPL-MCNC: 10.9 MG/DL (ref 8.7–10.5)
CATH EF ESTIMATED: 60 %
CHLORIDE SERPL-SCNC: 108 MMOL/L (ref 95–110)
CHLORIDE SERPL-SCNC: 108 MMOL/L (ref 95–110)
CO2 SERPL-SCNC: 23 MMOL/L (ref 23–29)
CO2 SERPL-SCNC: 23 MMOL/L (ref 23–29)
CREAT SERPL-MCNC: 0.7 MG/DL (ref 0.5–1.4)
CREAT SERPL-MCNC: 0.7 MG/DL (ref 0.5–1.4)
DIFFERENTIAL METHOD: ABNORMAL
EOSINOPHIL # BLD AUTO: 0.1 K/UL (ref 0–0.5)
EOSINOPHIL NFR BLD: 0.9 % (ref 0–8)
ERYTHROCYTE [DISTWIDTH] IN BLOOD BY AUTOMATED COUNT: 11.7 % (ref 11.5–14.5)
ERYTHROCYTE [DISTWIDTH] IN BLOOD BY AUTOMATED COUNT: 11.7 % (ref 11.5–14.5)
EST. GFR  (AFRICAN AMERICAN): >60 ML/MIN/1.73 M^2
EST. GFR  (AFRICAN AMERICAN): >60 ML/MIN/1.73 M^2
EST. GFR  (NON AFRICAN AMERICAN): >60 ML/MIN/1.73 M^2
EST. GFR  (NON AFRICAN AMERICAN): >60 ML/MIN/1.73 M^2
GLUCOSE SERPL-MCNC: 104 MG/DL (ref 70–110)
GLUCOSE SERPL-MCNC: 104 MG/DL (ref 70–110)
HCT VFR BLD AUTO: 50 % (ref 37–48.5)
HCT VFR BLD AUTO: 50 % (ref 37–48.5)
HGB BLD-MCNC: 16.9 G/DL (ref 12–16)
HGB BLD-MCNC: 16.9 G/DL (ref 12–16)
IMM GRANULOCYTES # BLD AUTO: 0.02 K/UL (ref 0–0.04)
IMM GRANULOCYTES NFR BLD AUTO: 0.2 % (ref 0–0.5)
LYMPHOCYTES # BLD AUTO: 2.3 K/UL (ref 1–4.8)
LYMPHOCYTES NFR BLD: 28.2 % (ref 18–48)
MAGNESIUM SERPL-MCNC: 1.9 MG/DL (ref 1.6–2.6)
MCH RBC QN AUTO: 32.6 PG (ref 27–31)
MCH RBC QN AUTO: 32.6 PG (ref 27–31)
MCHC RBC AUTO-ENTMCNC: 33.8 G/DL (ref 32–36)
MCHC RBC AUTO-ENTMCNC: 33.8 G/DL (ref 32–36)
MCV RBC AUTO: 97 FL (ref 82–98)
MCV RBC AUTO: 97 FL (ref 82–98)
MONOCYTES # BLD AUTO: 0.9 K/UL (ref 0.3–1)
MONOCYTES NFR BLD: 10.7 % (ref 4–15)
NEUTROPHILS # BLD AUTO: 4.8 K/UL (ref 1.8–7.7)
NEUTROPHILS NFR BLD: 59.8 % (ref 38–73)
NRBC BLD-RTO: 0 /100 WBC
PHOSPHATE SERPL-MCNC: 2.6 MG/DL (ref 2.7–4.5)
PLATELET # BLD AUTO: 200 K/UL (ref 150–350)
PLATELET # BLD AUTO: 200 K/UL (ref 150–350)
PMV BLD AUTO: 10 FL (ref 9.2–12.9)
PMV BLD AUTO: 10 FL (ref 9.2–12.9)
POC ACTIVATED CLOTTING TIME K: 274 SEC (ref 74–137)
POTASSIUM SERPL-SCNC: 3.8 MMOL/L (ref 3.5–5.1)
POTASSIUM SERPL-SCNC: 3.8 MMOL/L (ref 3.5–5.1)
PROT SERPL-MCNC: 7.3 G/DL (ref 6–8.4)
RBC # BLD AUTO: 5.18 M/UL (ref 4–5.4)
RBC # BLD AUTO: 5.18 M/UL (ref 4–5.4)
SAMPLE: ABNORMAL
SODIUM SERPL-SCNC: 141 MMOL/L (ref 136–145)
SODIUM SERPL-SCNC: 141 MMOL/L (ref 136–145)
WBC # BLD AUTO: 8.11 K/UL (ref 3.9–12.7)
WBC # BLD AUTO: 8.11 K/UL (ref 3.9–12.7)

## 2019-08-27 PROCEDURE — 99253 PR INITIAL INPATIENT CONSULT,LEVL III: ICD-10-PCS | Mod: ,,, | Performed by: THORACIC SURGERY (CARDIOTHORACIC VASCULAR SURGERY)

## 2019-08-27 PROCEDURE — 63600175 PHARM REV CODE 636 W HCPCS: Performed by: INTERNAL MEDICINE

## 2019-08-27 PROCEDURE — 25000003 PHARM REV CODE 250: Performed by: INTERNAL MEDICINE

## 2019-08-27 PROCEDURE — 21000000 HC CCU ICU ROOM CHARGE

## 2019-08-27 PROCEDURE — 93458 L HRT ARTERY/VENTRICLE ANGIO: CPT | Performed by: INTERNAL MEDICINE

## 2019-08-27 PROCEDURE — 92941 PRQ TRLML REVSC TOT OCCL AMI: CPT | Performed by: INTERNAL MEDICINE

## 2019-08-27 PROCEDURE — 85025 COMPLETE CBC W/AUTO DIFF WBC: CPT

## 2019-08-27 PROCEDURE — 99153 MOD SED SAME PHYS/QHP EA: CPT | Performed by: INTERNAL MEDICINE

## 2019-08-27 PROCEDURE — 99253 IP/OBS CNSLTJ NEW/EST LOW 45: CPT | Mod: ,,, | Performed by: THORACIC SURGERY (CARDIOTHORACIC VASCULAR SURGERY)

## 2019-08-27 PROCEDURE — 63600175 PHARM REV CODE 636 W HCPCS: Performed by: PHYSICIAN ASSISTANT

## 2019-08-27 PROCEDURE — 83735 ASSAY OF MAGNESIUM: CPT

## 2019-08-27 PROCEDURE — 36415 COLL VENOUS BLD VENIPUNCTURE: CPT

## 2019-08-27 PROCEDURE — C1769 GUIDE WIRE: HCPCS | Performed by: INTERNAL MEDICINE

## 2019-08-27 PROCEDURE — C1887 CATHETER, GUIDING: HCPCS | Performed by: INTERNAL MEDICINE

## 2019-08-27 PROCEDURE — 27800903 OPTIME MED/SURG SUP & DEVICES OTHER IMPLANTS: Performed by: INTERNAL MEDICINE

## 2019-08-27 PROCEDURE — 94761 N-INVAS EAR/PLS OXIMETRY MLT: CPT

## 2019-08-27 PROCEDURE — C1894 INTRO/SHEATH, NON-LASER: HCPCS | Performed by: INTERNAL MEDICINE

## 2019-08-27 PROCEDURE — 99152 MOD SED SAME PHYS/QHP 5/>YRS: CPT | Performed by: INTERNAL MEDICINE

## 2019-08-27 PROCEDURE — C1725 CATH, TRANSLUMIN NON-LASER: HCPCS | Performed by: INTERNAL MEDICINE

## 2019-08-27 PROCEDURE — 80053 COMPREHEN METABOLIC PANEL: CPT

## 2019-08-27 PROCEDURE — 93005 ELECTROCARDIOGRAM TRACING: CPT

## 2019-08-27 PROCEDURE — 84100 ASSAY OF PHOSPHORUS: CPT

## 2019-08-27 RX ORDER — LIDOCAINE HYDROCHLORIDE 10 MG/ML
INJECTION, SOLUTION EPIDURAL; INFILTRATION; INTRACAUDAL; PERINEURAL
Status: DISCONTINUED | OUTPATIENT
Start: 2019-08-27 | End: 2019-08-27

## 2019-08-27 RX ORDER — FENTANYL CITRATE 50 UG/ML
INJECTION, SOLUTION INTRAMUSCULAR; INTRAVENOUS
Status: DISCONTINUED | OUTPATIENT
Start: 2019-08-27 | End: 2019-08-27

## 2019-08-27 RX ORDER — SODIUM CHLORIDE 9 MG/ML
INJECTION, SOLUTION INTRAVENOUS ONCE
Status: COMPLETED | OUTPATIENT
Start: 2019-08-27 | End: 2019-08-27

## 2019-08-27 RX ORDER — SODIUM CHLORIDE 9 MG/ML
INJECTION, SOLUTION INTRAVENOUS CONTINUOUS
Status: ACTIVE | OUTPATIENT
Start: 2019-08-27 | End: 2019-08-27

## 2019-08-27 RX ORDER — NITROGLYCERIN 5 MG/ML
INJECTION, SOLUTION INTRAVENOUS
Status: DISCONTINUED | OUTPATIENT
Start: 2019-08-27 | End: 2019-08-27

## 2019-08-27 RX ORDER — VERAPAMIL HYDROCHLORIDE 2.5 MG/ML
INJECTION, SOLUTION INTRAVENOUS
Status: DISCONTINUED | OUTPATIENT
Start: 2019-08-27 | End: 2019-08-27

## 2019-08-27 RX ORDER — POTASSIUM CHLORIDE 20 MEQ/1
40 TABLET, EXTENDED RELEASE ORAL ONCE
Status: COMPLETED | OUTPATIENT
Start: 2019-08-27 | End: 2019-08-27

## 2019-08-27 RX ORDER — MIDAZOLAM HYDROCHLORIDE 1 MG/ML
INJECTION INTRAMUSCULAR; INTRAVENOUS
Status: DISCONTINUED | OUTPATIENT
Start: 2019-08-27 | End: 2019-08-27

## 2019-08-27 RX ORDER — HEPARIN SODIUM 10000 [USP'U]/ML
INJECTION, SOLUTION INTRAVENOUS; SUBCUTANEOUS
Status: DISCONTINUED | OUTPATIENT
Start: 2019-08-27 | End: 2019-08-27

## 2019-08-27 RX ADMIN — ASPIRIN 81 MG: 81 TABLET, COATED ORAL at 09:08

## 2019-08-27 RX ADMIN — POTASSIUM CHLORIDE 40 MEQ: 1500 TABLET, EXTENDED RELEASE ORAL at 06:08

## 2019-08-27 RX ADMIN — ENOXAPARIN SODIUM 40 MG: 100 INJECTION SUBCUTANEOUS at 06:08

## 2019-08-27 RX ADMIN — SODIUM CHLORIDE 1000 ML: 0.9 INJECTION, SOLUTION INTRAVENOUS at 08:08

## 2019-08-27 RX ADMIN — ATORVASTATIN CALCIUM 40 MG: 40 TABLET, FILM COATED ORAL at 08:08

## 2019-08-27 RX ADMIN — RAMELTEON 8 MG: 8 TABLET ORAL at 08:08

## 2019-08-27 NOTE — PROGRESS NOTES
Patient underwent coronary angiography showing focal three vessel native coronary artery disease. Right coronary artery was felt to be culprit. A wire crossed the lesion. Due to the unstable appearing plaque and what appeared to be plaque rupture we were not aggressive. Low pressure inflations were made first with a 2.0mm balloon. The mid/distal lesion did not change. A 2.5mm balloon was attempted at low pressure but the lesion did not change. There is evidence of a small dissection, controlled, no extravastion of contrast. There is improved flow to the distal vessel. At this point the procedure was stopped. More aggressive approach could lead to vessel perforation, spiral dissection. CTS will be consulted to assess for 3V CABG.

## 2019-08-27 NOTE — H&P (VIEW-ONLY)
This patient has severe coronary artery disease.  She had myocardial infarction and underwent heart catheterization showing multivessel coronary artery disease.  She was referred for consideration of coronary artery bypass grafting.  She has history of hypertension and smoking.  She has a history of multiple back surgeries in the past.  She is otherwise quite active and has had progressive chest discomfort and shortness of breath.  Her medicines are noted and are part of the epic record.  Her review of systems is otherwise fairly unremarkable.  Medicines were reviewed.  On physical exam she is healthy appearing in no distress.  Vital signs are stable.  Her pupils are equal and round reactive to light.  Her neck is supple.  Her chest is clear to auscultation.  Her abdomen is benign.  Her heart is in a regular rate rhythm.  Perfusion to the legs and feet seems to be satisfactory.  Her angiograms were reviewed and show multivessel coronary artery disease.  Recommendation is for coronary artery bypass grafting.  I explained this to the patient and  and they seem to be understanding and agreeable.  This will be arranged in the relatively near future.

## 2019-08-27 NOTE — HOSPITAL COURSE
63-year-old white female admitted 08/25/2019 as a transfer from [facility] with diagnosis of NSTEMI.  Patient reported left shoulder pain left arm pain and left jaw pain that started Thursday.  Patient also had nausea and vomiting that day.  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  Reports she stayed home 3 days and on Sunday when she developed shortness of breath she presented to the ER where she was found to have elevated troponin levels.  Patient was transferred to [facility] for NSTEMI and left heart catheterization.  Cardiac enzymes trended, Lovenox given, aspirin started, statin started, blood pressure controlled.  Patient was admitted for left heart catheterization to be scheduled for 08/27/2019.  During the hospital stay patient remained asymptomatic but did reported off and on shortness of breath.  Denies chest pain.  Patient to be NPO after midnight.  Discharge will be planned according to her angiogram findings

## 2019-08-27 NOTE — PROGRESS NOTES
[facility] Medicine  Progress Note    Patient Name: Nancy Muñoz  MRN: [id number]  Patient Class: IP- Inpatient   Admission Date: 8/25/2019  Length of Stay: 0 days  Attending Physician: Artem Heard MD  Primary Care Provider: Jan Petersen MD        Subjective:     Principal Problem:NSTEMI (non-ST elevated myocardial infarction)        HPI:  63-year-old white female transferred from [facility] ER with diagnosis of NSTEMI.  Patient has a known history of hypertension, hyperlipidemia, degenerative disc disease. The patient informed that she developed chest pressure associated with left shoulder brain, left jaw pain, left arm pain on Thursday.  Patient report by Friday she developed severe nausea and vomiting with upper abdominal pain. 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  Patient states she remained in bed Thursday Friday and Saturday.  Patient states on Friday 1 of her friend checked on her and which is min she described her symptom the friend suggested she may be having a heart attack.  Patient reports she go good the symptoms and she was surprised that she may be having a heart attack but she thought it might have passed already.  Today patient presented to the ER where her troponins were found to be at 2.7, her EKG showed Q-waves in lead 3 and AVF.  Her BNP was normal, her creatinine is normal.  ER also did the chest CTA which was negative for PE.  Patient was given aspirin and nitro paste.  Patient reports she does not have any chest pain or chest heaviness anymore.  The patient denies shortness of breath either.  Did discuss the findings and informed that we will admit her as observation.  And that I will contact Dr. Pham regarding the procedure if it is going to be scheduled for Monday or Tuesday.  Patient reports she is very hungry and if she can eat.  Will start cardiac diet for  now  Discussed will order echocardiogram tomorrow morning as well    Overview/Hospital Course:  No notes on file    Interval History:  Patient seen and examined this afternoon.  Patient is sitting comfortably in bed.  Reports her chest pain has resolved only has mild shortness of breath from time to time that she feels.  Denies any chest heaviness as well.  Discussed her troponin levels have are  trending down.  Also informed that she will have to be NPO tonight for left heart catheterization tomorrow with Dr. Pham.  Discuss her discharge will depend on the findings of angiogram tomorrow.  Patient verbalized understanding    Review of Systems   Constitutional: Negative for diaphoresis, fatigue, fever and unexpected weight change.   HENT: Positive for congestion ( history of seasonal allergies) and postnasal drip ( history of seasonal allergies). Negative for drooling and nosebleeds.         Left sided jaw pain Thursday   Eyes: Negative for pain and discharge.   Respiratory: Positive for shortness of breath (Off and on since 08/22/2019). Negative for apnea and chest tightness.    Cardiovascular: Negative for chest pain, palpitations and leg swelling.   Gastrointestinal: Negative for abdominal pain, nausea and vomiting.   Genitourinary: Negative.    Musculoskeletal: Positive for back pain ( chronic, multiple DDD) and neck pain ( chronic, DDD).   Skin: Negative.    Allergic/Immunologic: Positive for environmental allergies ( seasonal allergies).   Neurological: Negative for dizziness, seizures, speech difficulty, weakness and light-headedness.   Hematological: Negative.    Psychiatric/Behavioral: Negative.      Objective:     Vital Signs (Most Recent):  Temp: 98.6 °F (37 °C) (08/26/19 1510)  Pulse: 75 (08/26/19 1510)  Resp: 20 (08/26/19 1510)  BP: (!) 100/54 (08/26/19 1510)  SpO2: 97 % (08/26/19 1510) Vital Signs (24h Range):  Temp:  [97.8 °F (36.6 °C)-98.6 °F (37 °C)] 98.6 °F (37 °C)  Pulse:  [67-90] 75  Resp:   [12-20] 20  SpO2:  [94 %-99 %] 97 %  BP: ()/(54-79) 100/54     Weight: 70.5 kg (155 lb 6.8 oz)  Body mass index is 27.53 kg/m².    Intake/Output Summary (Last 24 hours) at 8/26/2019 1934  Last data filed at 8/26/2019 1327  Gross per 24 hour   Intake 480 ml   Output --   Net 480 ml      Physical Exam   Constitutional: She is oriented to person, place, and time. She appears well-developed and well-nourished. No distress.   HENT:   Head: Normocephalic and atraumatic.   Eyes: Pupils are equal, round, and reactive to light. Conjunctivae and EOM are normal. No scleral icterus.   Neck: Normal range of motion. Neck supple.   Cardiovascular: Normal rate, regular rhythm, normal heart sounds and intact distal pulses. Exam reveals no gallop and no friction rub.   No murmur heard.  Pulmonary/Chest: Effort normal and breath sounds normal.   Abdominal: Soft. Bowel sounds are normal.   Musculoskeletal: Normal range of motion. She exhibits no edema or deformity.   Neurological: She is alert and oriented to person, place, and time. No cranial nerve deficit.   Skin: Skin is warm and dry. No rash noted. She is not diaphoretic.   Psychiatric: She has a normal mood and affect. Her behavior is normal. Judgment and thought content normal.   Nursing note and vitals reviewed.      Significant Labs:   CBC:   Recent Labs   Lab 08/25/19  1132 08/26/19  0447   WBC 7.73 6.25   HGB 17.0* 15.3   HCT 49.1* 44.2    196     CMP:   Recent Labs   Lab 08/25/19  1132 08/26/19  0447    143   K 4.0 3.6    108   CO2 26 28   GLU 83 97   BUN 27* 20   CREATININE 0.9 0.7   CALCIUM 12.0* 10.3   PROT 7.3 6.4   ALBUMIN 4.6 3.8   BILITOT 0.8 1.5*   ALKPHOS 121 85   AST 28 20   ALT 27 21   ANIONGAP 10 7*   EGFRNONAA >60 >60.0     Lipid Panel: No results for input(s): CHOL, HDL, LDLCALC, TRIG, CHOLHDL in the last 48 hours.  Troponin:   Recent Labs   Lab 08/25/19  1132 08/25/19  1845 08/25/19  2309   TROPONINI 2.709* 2.670* 2.418*        Significant Imaging: I have reviewed all pertinent imaging results/findings within the past 24 hours.     Echocardiogram done 08/26/2019 shows normal EF at 60% and normal left ventricular size, concentric remodeling observed.  Normal left ventricular diastolic function, no wall motion abnormality      Assessment/Plan:      * NSTEMI (non-ST elevated myocardial infarction)  NSTEMI, chest pain started Thursday 08/22/2019, patient presented to [facility] ER 08/25/2019  Cardiac enzymes trending down troponin decreased from 2.7 on admit to 2.4 today  Pulse ox monitoring, keep O2 greater than 92%  Lovenox 40 mg subcu  Aspirin 81 mg daily  Continue atorvastatin 40 mg HS  Echo with a normal range  Left heart catheterization scheduled for Tuesday morning 08/27/2019 Cardiac diet today  NPO after midnight Tuesday 08/27/2019  Discuss discharge planning will be according to her angiogram finding    Hypertension  Chronic medical condition  Continue home meds        Patient Active Problem List   Diagnosis    Lumbar radiculitis    Diverticulitis    Lower abdominal pain    Hypertension    Hypercalcemia    Abnormal abdominal CT scan    Cholecystitis    NSTEMI (non-ST elevated myocardial infarction)       VTE Risk Mitigation (From admission, onward)        Ordered     enoxaparin injection 40 mg  Daily      08/25/19 1804     IP VTE HIGH RISK PATIENT  Once      08/25/19 1804                Artem Heard MD  Department of Hospital Medicine   [facility]

## 2019-08-27 NOTE — SUBJECTIVE & OBJECTIVE
Interval History:  Patient seen and examined this afternoon.  Patient is sitting comfortably in bed.  Reports her chest pain has resolved only has mild shortness of breath from time to time that she feels.  Denies any chest heaviness as well.  Discussed her troponin levels have are  trending down.  Also informed that she will have to be NPO tonight for left heart catheterization tomorrow with Dr. Pham.  Discuss her discharge will depend on the findings of angiogram tomorrow.  Patient verbalized understanding    Review of Systems   Constitutional: Negative for diaphoresis, fatigue, fever and unexpected weight change.   HENT: Positive for congestion ( history of seasonal allergies) and postnasal drip ( history of seasonal allergies). Negative for drooling and nosebleeds.         Left sided jaw pain Thursday   Eyes: Negative for pain and discharge.   Respiratory: Positive for shortness of breath (Off and on since 08/22/2019). Negative for apnea and chest tightness.    Cardiovascular: Negative for chest pain, palpitations and leg swelling.   Gastrointestinal: Negative for abdominal pain, nausea and vomiting.   Genitourinary: Negative.    Musculoskeletal: Positive for back pain ( chronic, multiple DDD) and neck pain ( chronic, DDD).   Skin: Negative.    Allergic/Immunologic: Positive for environmental allergies ( seasonal allergies).   Neurological: Negative for dizziness, seizures, speech difficulty, weakness and light-headedness.   Hematological: Negative.    Psychiatric/Behavioral: Negative.      Objective:     Vital Signs (Most Recent):  Temp: 98.6 °F (37 °C) (08/26/19 1510)  Pulse: 75 (08/26/19 1510)  Resp: 20 (08/26/19 1510)  BP: (!) 100/54 (08/26/19 1510)  SpO2: 97 % (08/26/19 1510) Vital Signs (24h Range):  Temp:  [97.8 °F (36.6 °C)-98.6 °F (37 °C)] 98.6 °F (37 °C)  Pulse:  [67-90] 75  Resp:  [12-20] 20  SpO2:  [94 %-99 %] 97 %  BP: ()/(54-79) 100/54     Weight: 70.5 kg (155 lb 6.8 oz)  Body mass index is  27.53 kg/m².    Intake/Output Summary (Last 24 hours) at 8/26/2019 1934  Last data filed at 8/26/2019 1327  Gross per 24 hour   Intake 480 ml   Output --   Net 480 ml      Physical Exam   Constitutional: She is oriented to person, place, and time. She appears well-developed and well-nourished. No distress.   HENT:   Head: Normocephalic and atraumatic.   Eyes: Pupils are equal, round, and reactive to light. Conjunctivae and EOM are normal. No scleral icterus.   Neck: Normal range of motion. Neck supple.   Cardiovascular: Normal rate, regular rhythm, normal heart sounds and intact distal pulses. Exam reveals no gallop and no friction rub.   No murmur heard.  Pulmonary/Chest: Effort normal and breath sounds normal.   Abdominal: Soft. Bowel sounds are normal.   Musculoskeletal: Normal range of motion. She exhibits no edema or deformity.   Neurological: She is alert and oriented to person, place, and time. No cranial nerve deficit.   Skin: Skin is warm and dry. No rash noted. She is not diaphoretic.   Psychiatric: She has a normal mood and affect. Her behavior is normal. Judgment and thought content normal.   Nursing note and vitals reviewed.      Significant Labs:   CBC:   Recent Labs   Lab 08/25/19  1132 08/26/19  0447   WBC 7.73 6.25   HGB 17.0* 15.3   HCT 49.1* 44.2    196     CMP:   Recent Labs   Lab 08/25/19  1132 08/26/19  0447    143   K 4.0 3.6    108   CO2 26 28   GLU 83 97   BUN 27* 20   CREATININE 0.9 0.7   CALCIUM 12.0* 10.3   PROT 7.3 6.4   ALBUMIN 4.6 3.8   BILITOT 0.8 1.5*   ALKPHOS 121 85   AST 28 20   ALT 27 21   ANIONGAP 10 7*   EGFRNONAA >60 >60.0     Lipid Panel: No results for input(s): CHOL, HDL, LDLCALC, TRIG, CHOLHDL in the last 48 hours.  Troponin:   Recent Labs   Lab 08/25/19  1132 08/25/19  1845 08/25/19  2309   TROPONINI 2.709* 2.670* 2.418*       Significant Imaging: I have reviewed all pertinent imaging results/findings within the past 24 hours.     Echocardiogram done  08/26/2019 shows normal EF at 60% and normal left ventricular size, concentric remodeling observed.  Normal left ventricular diastolic function, no wall motion abnormality

## 2019-08-27 NOTE — ASSESSMENT & PLAN NOTE
NSTEMI, chest pain started Thursday 08/22/2019, patient presented to [facility] ER 08/25/2019  Cardiac enzymes trending down troponin decreased from 2.7 on admit to 2.4 today  Pulse ox monitoring, keep O2 greater than 92%  Lovenox 40 mg subcu  Aspirin 81 mg daily  Continue atorvastatin 40 mg HS  Echo with a normal range  Left heart catheterization scheduled for Tuesday morning 08/27/2019 Cardiac diet today  NPO after midnight Tuesday 08/27/2019  Discuss discharge planning will be according to her angiogram finding

## 2019-08-27 NOTE — PROGRESS NOTES
[facility] Medicine  Progress Note    Patient Name: Nancy Muñoz  MRN: [id number]  Patient Class: IP- Inpatient   Admission Date: 8/25/2019  Length of Stay: 1 days  Attending Physician: Reji Soto MD  Primary Care Provider: Jan Petersen MD        Subjective:     Principal Problem:NSTEMI (non-ST elevated myocardial infarction)    Interval History:  Seen and examined, no chest pain, undergoing cardiac catheterization today    Review of Systems  Objective:     Vital Signs (Most Recent):  Temp: 98 °F (36.7 °C) (08/27/19 1733)  Pulse: 65 (08/27/19 1754)  Resp: 18 (08/27/19 1754)  BP: (!) 141/57 (08/27/19 1733)  SpO2: 96 % (08/27/19 1754) Vital Signs (24h Range):  Temp:  [97.8 °F (36.6 °C)-98.7 °F (37.1 °C)] 98 °F (36.7 °C)  Pulse:  [61-90] 65  Resp:  [16-29] 18  SpO2:  [94 %-98 %] 96 %  BP: (108-143)/(57-86) 141/57     Weight: 67.8 kg (149 lb 7.6 oz)  Body mass index is 26.48 kg/m².  No intake or output data in the 24 hours ending 08/27/19 1851   Physical Exam  General, anxious,  Lungs, clear to auscultation bilaterally  Cardiac, regular rhythm and rate  Abdomen, soft nontender nondistended  Extremities, no calf tenderness no edema      Significant Labs:   BMP:   Recent Labs   Lab 08/28/19  0343     105     143   K 3.7  3.7     110   CO2 26  26   BUN 33*  33*   CREATININE 0.7  0.7   CALCIUM 10.6*  10.6*   MG 1.8     CBC:   Recent Labs   Lab 08/27/19  0447 08/28/19  0343   WBC 8.11  8.11 7.94  7.94   HGB 16.9*  16.9* 15.7  15.7   HCT 50.0*  50.0* 46.2  46.2     200 173  173         Assessment/Plan:      Active Diagnoses:    Diagnosis Date Noted POA    PRINCIPAL PROBLEM:  NSTEMI (non-ST elevated myocardial infarction) [I21.4] 08/25/2019 Yes    Hypertension [I10] 02/23/2018 Yes      Problems Resolved During this Admission:     VTE Risk Mitigation (From admission, onward)        Ordered     heparin (porcine) injection  As needed (PRN)       08/27/19 1205     enoxaparin injection 40 mg  Daily      08/25/19 1804     IP VTE HIGH RISK PATIENT  Once      08/25/19 1804         Non STEMI,   Troponin is trending down, continue statin, continue aspirin, continue metoprolol  Cardiology following, undergoing cardiac catheterization today    HTN, continue metoprolol continue triamterene hydrochlorothiazide    Nicotine abuse, continue nicotine patch, consult    Reji Soto MD  Department of Hospital Medicine   [facility]

## 2019-08-28 ENCOUNTER — TELEPHONE (OUTPATIENT)
Dept: UROLOGY | Facility: CLINIC | Age: 64
End: 2019-08-28

## 2019-08-28 LAB
ALBUMIN SERPL BCP-MCNC: 4 G/DL (ref 3.5–5.2)
ALP SERPL-CCNC: 92 U/L (ref 55–135)
ALT SERPL W/O P-5'-P-CCNC: 23 U/L (ref 10–44)
ANION GAP SERPL CALC-SCNC: 7 MMOL/L (ref 8–16)
ANION GAP SERPL CALC-SCNC: 7 MMOL/L (ref 8–16)
AST SERPL-CCNC: 21 U/L (ref 10–40)
BACTERIA #/AREA URNS HPF: ABNORMAL /HPF
BACTERIA #/AREA URNS HPF: ABNORMAL /HPF
BASOPHILS # BLD AUTO: 0.03 K/UL (ref 0–0.2)
BASOPHILS # BLD AUTO: 0.03 K/UL (ref 0–0.2)
BASOPHILS NFR BLD: 0.4 % (ref 0–1.9)
BASOPHILS NFR BLD: 0.4 % (ref 0–1.9)
BILIRUB SERPL-MCNC: 1.9 MG/DL (ref 0.1–1)
BILIRUB UR QL STRIP: NEGATIVE
BILIRUB UR QL STRIP: NEGATIVE
BUN SERPL-MCNC: 33 MG/DL (ref 8–23)
BUN SERPL-MCNC: 33 MG/DL (ref 8–23)
CALCIUM SERPL-MCNC: 10.6 MG/DL (ref 8.7–10.5)
CALCIUM SERPL-MCNC: 10.6 MG/DL (ref 8.7–10.5)
CHLORIDE SERPL-SCNC: 110 MMOL/L (ref 95–110)
CHLORIDE SERPL-SCNC: 110 MMOL/L (ref 95–110)
CLARITY UR: ABNORMAL
CLARITY UR: CLEAR
CO2 SERPL-SCNC: 26 MMOL/L (ref 23–29)
CO2 SERPL-SCNC: 26 MMOL/L (ref 23–29)
COLOR UR: ABNORMAL
COLOR UR: COLORLESS
CREAT SERPL-MCNC: 0.7 MG/DL (ref 0.5–1.4)
CREAT SERPL-MCNC: 0.7 MG/DL (ref 0.5–1.4)
DIFFERENTIAL METHOD: ABNORMAL
DIFFERENTIAL METHOD: ABNORMAL
EOSINOPHIL # BLD AUTO: 0.1 K/UL (ref 0–0.5)
EOSINOPHIL # BLD AUTO: 0.1 K/UL (ref 0–0.5)
EOSINOPHIL NFR BLD: 0.8 % (ref 0–8)
EOSINOPHIL NFR BLD: 0.8 % (ref 0–8)
ERYTHROCYTE [DISTWIDTH] IN BLOOD BY AUTOMATED COUNT: 11.7 % (ref 11.5–14.5)
ERYTHROCYTE [DISTWIDTH] IN BLOOD BY AUTOMATED COUNT: 11.7 % (ref 11.5–14.5)
EST. GFR  (AFRICAN AMERICAN): >60 ML/MIN/1.73 M^2
EST. GFR  (AFRICAN AMERICAN): >60 ML/MIN/1.73 M^2
EST. GFR  (NON AFRICAN AMERICAN): >60 ML/MIN/1.73 M^2
EST. GFR  (NON AFRICAN AMERICAN): >60 ML/MIN/1.73 M^2
GLUCOSE SERPL-MCNC: 105 MG/DL (ref 70–110)
GLUCOSE SERPL-MCNC: 105 MG/DL (ref 70–110)
GLUCOSE UR QL STRIP: NEGATIVE
GLUCOSE UR QL STRIP: NEGATIVE
HCT VFR BLD AUTO: 46.2 % (ref 37–48.5)
HCT VFR BLD AUTO: 46.2 % (ref 37–48.5)
HGB BLD-MCNC: 15.7 G/DL (ref 12–16)
HGB BLD-MCNC: 15.7 G/DL (ref 12–16)
HGB UR QL STRIP: ABNORMAL
HGB UR QL STRIP: ABNORMAL
HYALINE CASTS #/AREA URNS LPF: 1 /LPF
HYALINE CASTS #/AREA URNS LPF: 4 /LPF
IMM GRANULOCYTES # BLD AUTO: 0.03 K/UL (ref 0–0.04)
IMM GRANULOCYTES # BLD AUTO: 0.03 K/UL (ref 0–0.04)
IMM GRANULOCYTES NFR BLD AUTO: 0.4 % (ref 0–0.5)
IMM GRANULOCYTES NFR BLD AUTO: 0.4 % (ref 0–0.5)
KETONES UR QL STRIP: NEGATIVE
KETONES UR QL STRIP: NEGATIVE
LEUKOCYTE ESTERASE UR QL STRIP: ABNORMAL
LEUKOCYTE ESTERASE UR QL STRIP: ABNORMAL
LYMPHOCYTES # BLD AUTO: 2.2 K/UL (ref 1–4.8)
LYMPHOCYTES # BLD AUTO: 2.2 K/UL (ref 1–4.8)
LYMPHOCYTES NFR BLD: 28.2 % (ref 18–48)
LYMPHOCYTES NFR BLD: 28.2 % (ref 18–48)
MAGNESIUM SERPL-MCNC: 1.8 MG/DL (ref 1.6–2.6)
MCH RBC QN AUTO: 32.6 PG (ref 27–31)
MCH RBC QN AUTO: 32.6 PG (ref 27–31)
MCHC RBC AUTO-ENTMCNC: 34 G/DL (ref 32–36)
MCHC RBC AUTO-ENTMCNC: 34 G/DL (ref 32–36)
MCV RBC AUTO: 96 FL (ref 82–98)
MCV RBC AUTO: 96 FL (ref 82–98)
MICROSCOPIC COMMENT: ABNORMAL
MICROSCOPIC COMMENT: ABNORMAL
MONOCYTES # BLD AUTO: 0.9 K/UL (ref 0.3–1)
MONOCYTES # BLD AUTO: 0.9 K/UL (ref 0.3–1)
MONOCYTES NFR BLD: 11.2 % (ref 4–15)
MONOCYTES NFR BLD: 11.2 % (ref 4–15)
NEUTROPHILS # BLD AUTO: 4.7 K/UL (ref 1.8–7.7)
NEUTROPHILS # BLD AUTO: 4.7 K/UL (ref 1.8–7.7)
NEUTROPHILS NFR BLD: 59 % (ref 38–73)
NEUTROPHILS NFR BLD: 59 % (ref 38–73)
NITRITE UR QL STRIP: NEGATIVE
NITRITE UR QL STRIP: NEGATIVE
NRBC BLD-RTO: 0 /100 WBC
NRBC BLD-RTO: 0 /100 WBC
PH UR STRIP: 7 [PH] (ref 5–8)
PH UR STRIP: 7 [PH] (ref 5–8)
PHOSPHATE SERPL-MCNC: 2.7 MG/DL (ref 2.7–4.5)
PLATELET # BLD AUTO: 173 K/UL (ref 150–350)
PLATELET # BLD AUTO: 173 K/UL (ref 150–350)
PMV BLD AUTO: 9.6 FL (ref 9.2–12.9)
PMV BLD AUTO: 9.6 FL (ref 9.2–12.9)
POTASSIUM SERPL-SCNC: 3.7 MMOL/L (ref 3.5–5.1)
POTASSIUM SERPL-SCNC: 3.7 MMOL/L (ref 3.5–5.1)
PROT SERPL-MCNC: 6.6 G/DL (ref 6–8.4)
PROT UR QL STRIP: ABNORMAL
PROT UR QL STRIP: NEGATIVE
RBC # BLD AUTO: 4.81 M/UL (ref 4–5.4)
RBC # BLD AUTO: 4.81 M/UL (ref 4–5.4)
RBC #/AREA URNS HPF: >100 /HPF (ref 0–4)
RBC #/AREA URNS HPF: >100 /HPF (ref 0–4)
SODIUM SERPL-SCNC: 143 MMOL/L (ref 136–145)
SODIUM SERPL-SCNC: 143 MMOL/L (ref 136–145)
SP GR UR STRIP: 1.02 (ref 1–1.03)
SP GR UR STRIP: 1.02 (ref 1–1.03)
SQUAMOUS #/AREA URNS HPF: 1 /HPF
SQUAMOUS #/AREA URNS HPF: 2 /HPF
URN SPEC COLLECT METH UR: ABNORMAL
URN SPEC COLLECT METH UR: ABNORMAL
UROBILINOGEN UR STRIP-ACNC: NEGATIVE EU/DL
UROBILINOGEN UR STRIP-ACNC: NEGATIVE EU/DL
WBC # BLD AUTO: 7.94 K/UL (ref 3.9–12.7)
WBC # BLD AUTO: 7.94 K/UL (ref 3.9–12.7)
WBC #/AREA URNS HPF: 15 /HPF (ref 0–5)
WBC #/AREA URNS HPF: >100 /HPF (ref 0–5)

## 2019-08-28 PROCEDURE — 25000003 PHARM REV CODE 250: Performed by: INTERNAL MEDICINE

## 2019-08-28 PROCEDURE — 85025 COMPLETE CBC W/AUTO DIFF WBC: CPT

## 2019-08-28 PROCEDURE — 80053 COMPREHEN METABOLIC PANEL: CPT

## 2019-08-28 PROCEDURE — 36415 COLL VENOUS BLD VENIPUNCTURE: CPT

## 2019-08-28 PROCEDURE — 21000000 HC CCU ICU ROOM CHARGE

## 2019-08-28 PROCEDURE — 81001 URINALYSIS AUTO W/SCOPE: CPT

## 2019-08-28 PROCEDURE — 84100 ASSAY OF PHOSPHORUS: CPT

## 2019-08-28 PROCEDURE — 94761 N-INVAS EAR/PLS OXIMETRY MLT: CPT

## 2019-08-28 PROCEDURE — 25500020 PHARM REV CODE 255: Performed by: INTERNAL MEDICINE

## 2019-08-28 PROCEDURE — 27000221 HC OXYGEN, UP TO 24 HOURS

## 2019-08-28 PROCEDURE — 87077 CULTURE AEROBIC IDENTIFY: CPT

## 2019-08-28 PROCEDURE — 83735 ASSAY OF MAGNESIUM: CPT

## 2019-08-28 PROCEDURE — 87186 SC STD MICRODIL/AGAR DIL: CPT | Mod: 59

## 2019-08-28 PROCEDURE — 87086 URINE CULTURE/COLONY COUNT: CPT

## 2019-08-28 PROCEDURE — S4991 NICOTINE PATCH NONLEGEND: HCPCS | Performed by: INTERNAL MEDICINE

## 2019-08-28 RX ORDER — AMOXICILLIN AND CLAVULANATE POTASSIUM 875; 125 MG/1; MG/1
1 TABLET, FILM COATED ORAL EVERY 12 HOURS
Status: DISCONTINUED | OUTPATIENT
Start: 2019-08-28 | End: 2019-08-28

## 2019-08-28 RX ADMIN — ASPIRIN 81 MG: 81 TABLET, COATED ORAL at 09:08

## 2019-08-28 RX ADMIN — METOPROLOL SUCCINATE 12.5 MG: 25 TABLET, EXTENDED RELEASE ORAL at 09:08

## 2019-08-28 RX ADMIN — ATORVASTATIN CALCIUM 40 MG: 40 TABLET, FILM COATED ORAL at 10:08

## 2019-08-28 RX ADMIN — RAMELTEON 8 MG: 8 TABLET ORAL at 10:08

## 2019-08-28 RX ADMIN — NICOTINE 1 PATCH: 14 PATCH, EXTENDED RELEASE TRANSDERMAL at 09:08

## 2019-08-28 RX ADMIN — IOHEXOL 100 ML: 350 INJECTION, SOLUTION INTRAVENOUS at 11:08

## 2019-08-28 RX ADMIN — TRIAMTERENE AND HYDROCHLOROTHIAZIDE 1 TABLET: 37.5; 25 TABLET ORAL at 09:08

## 2019-08-28 NOTE — PROGRESS NOTES
[facility]    Cardiology Progress Note    Subjective:  Pt. Seen and examined this am. Waiting to determine timing of bypass surgery.   Feels overall well. Denies CP/SOB/weakness/dizziness/palpitations.  Only complaint is that she is urinating bright red blood. H/H normal this, all other labs wnl.  VSS.       Objective:  Vital Signs (Most Recent)  Temp: 97.7 °F (36.5 °C) (08/28/19 0720)  Pulse: 75 (08/28/19 0720)  Resp: (!) 39 (08/28/19 0720)  BP: (!) 106/57 (08/28/19 0720)  SpO2: 98 % (08/28/19 0720)    Vital Signs Range (Last 24H):  Temp:  [97.7 °F (36.5 °C)-98.4 °F (36.9 °C)]   Pulse:  [62-75]   Resp:  [16-39]   BP: (102-143)/(56-86)   SpO2:  [94 %-98 %]     I & O (Last 24H):    Intake/Output Summary (Last 24 hours) at 8/28/2019 1020  Last data filed at 8/28/2019 0600  Gross per 24 hour   Intake --   Output 900 ml   Net -900 ml       Current Diet:     Current Diet Order   Procedures    Diet Cardiac        Allergies:  Review of patient's allergies indicates:   Allergen Reactions    Pineapple Anaphylaxis       Meds:  Scheduled Meds:   aspirin  81 mg Oral Daily    atorvastatin  40 mg Oral QHS    metoprolol succinate  12.5 mg Oral Daily    nicotine  1 patch Transdermal Daily    polyethylene glycol  17 g Oral Daily    triamterene-hydrochlorothiazide 37.5-25 mg  1 tablet Oral QAM     Continuous Infusions:  PRN Meds:acetaminophen, acetaminophen, glucose, glucose, magnesium oxide, magnesium oxide, ondansetron, oxyCODONE-acetaminophen, potassium chloride 10%, potassium chloride 10%, potassium chloride 10%, potassium, sodium phosphates, potassium, sodium phosphates, potassium, sodium phosphates, promethazine (PHENERGAN) IVPB, ramelteon, sodium chloride 0.9%    Lab Results :  Recent Results (from the past 24 hour(s))   ISTAT ACT-K    Collection Time: 08/27/19 12:46 PM   Result Value Ref Range    POC ACTIVATED CLOTTING TIME K 274 (H) 74 - 137 sec    Sample ART    Cardiac catheterization    Collection Time:  08/27/19 12:59 PM   Result Value Ref Range    Cath EF Estimated 60 %   Comprehensive Metabolic Panel (CMP)    Collection Time: 08/28/19  3:43 AM   Result Value Ref Range    Sodium 143 136 - 145 mmol/L    Potassium 3.7 3.5 - 5.1 mmol/L    Chloride 110 95 - 110 mmol/L    CO2 26 23 - 29 mmol/L    Glucose 105 70 - 110 mg/dL    BUN, Bld 33 (H) 8 - 23 mg/dL    Creatinine 0.7 0.5 - 1.4 mg/dL    Calcium 10.6 (H) 8.7 - 10.5 mg/dL    Total Protein 6.6 6.0 - 8.4 g/dL    Albumin 4.0 3.5 - 5.2 g/dL    Total Bilirubin 1.9 (H) 0.1 - 1.0 mg/dL    Alkaline Phosphatase 92 55 - 135 U/L    AST 21 10 - 40 U/L    ALT 23 10 - 44 U/L    Anion Gap 7 (L) 8 - 16 mmol/L    eGFR if African American >60.0 >60 mL/min/1.73 m^2    eGFR if non African American >60.0 >60 mL/min/1.73 m^2   Magnesium    Collection Time: 08/28/19  3:43 AM   Result Value Ref Range    Magnesium 1.8 1.6 - 2.6 mg/dL   Phosphorus    Collection Time: 08/28/19  3:43 AM   Result Value Ref Range    Phosphorus 2.7 2.7 - 4.5 mg/dL   CBC with Automated Differential    Collection Time: 08/28/19  3:43 AM   Result Value Ref Range    WBC 7.94 3.90 - 12.70 K/uL    RBC 4.81 4.00 - 5.40 M/uL    Hemoglobin 15.7 12.0 - 16.0 g/dL    Hematocrit 46.2 37.0 - 48.5 %    Mean Corpuscular Volume 96 82 - 98 fL    Mean Corpuscular Hemoglobin 32.6 (H) 27.0 - 31.0 pg    Mean Corpuscular Hemoglobin Conc 34.0 32.0 - 36.0 g/dL    RDW 11.7 11.5 - 14.5 %    Platelets 173 150 - 350 K/uL    MPV 9.6 9.2 - 12.9 fL    Immature Granulocytes 0.4 0.0 - 0.5 %    Gran # (ANC) 4.7 1.8 - 7.7 K/uL    Immature Grans (Abs) 0.03 0.00 - 0.04 K/uL    Lymph # 2.2 1.0 - 4.8 K/uL    Mono # 0.9 0.3 - 1.0 K/uL    Eos # 0.1 0.0 - 0.5 K/uL    Baso # 0.03 0.00 - 0.20 K/uL    nRBC 0 0 /100 WBC    Gran% 59.0 38.0 - 73.0 %    Lymph% 28.2 18.0 - 48.0 %    Mono% 11.2 4.0 - 15.0 %    Eosinophil% 0.8 0.0 - 8.0 %    Basophil% 0.4 0.0 - 1.9 %    Differential Method Automated    CBC auto differential    Collection Time: 08/28/19  3:43 AM  "  Result Value Ref Range    WBC 7.94 3.90 - 12.70 K/uL    RBC 4.81 4.00 - 5.40 M/uL    Hemoglobin 15.7 12.0 - 16.0 g/dL    Hematocrit 46.2 37.0 - 48.5 %    Mean Corpuscular Volume 96 82 - 98 fL    Mean Corpuscular Hemoglobin 32.6 (H) 27.0 - 31.0 pg    Mean Corpuscular Hemoglobin Conc 34.0 32.0 - 36.0 g/dL    RDW 11.7 11.5 - 14.5 %    Platelets 173 150 - 350 K/uL    MPV 9.6 9.2 - 12.9 fL    Immature Granulocytes 0.4 0.0 - 0.5 %    Gran # (ANC) 4.7 1.8 - 7.7 K/uL    Immature Grans (Abs) 0.03 0.00 - 0.04 K/uL    Lymph # 2.2 1.0 - 4.8 K/uL    Mono # 0.9 0.3 - 1.0 K/uL    Eos # 0.1 0.0 - 0.5 K/uL    Baso # 0.03 0.00 - 0.20 K/uL    nRBC 0 0 /100 WBC    Gran% 59.0 38.0 - 73.0 %    Lymph% 28.2 18.0 - 48.0 %    Mono% 11.2 4.0 - 15.0 %    Eosinophil% 0.8 0.0 - 8.0 %    Basophil% 0.4 0.0 - 1.9 %    Differential Method Automated    Basic metabolic panel    Collection Time: 08/28/19  3:43 AM   Result Value Ref Range    Sodium 143 136 - 145 mmol/L    Potassium 3.7 3.5 - 5.1 mmol/L    Chloride 110 95 - 110 mmol/L    CO2 26 23 - 29 mmol/L    Glucose 105 70 - 110 mg/dL    BUN, Bld 33 (H) 8 - 23 mg/dL    Creatinine 0.7 0.5 - 1.4 mg/dL    Calcium 10.6 (H) 8.7 - 10.5 mg/dL    Anion Gap 7 (L) 8 - 16 mmol/L    eGFR if African American >60.0 >60 mL/min/1.73 m^2    eGFR if non African American >60.0 >60 mL/min/1.73 m^2       Diagnostic Results:      Recent Cardiac Rhythm   (if applicable)      Physical Exam:  Objective:  General Appearance:  Comfortable and well-appearing.    Vital signs: (most recent): Blood pressure (!) 106/57, pulse 75, temperature 97.7 °F (36.5 °C), resp. rate (!) 39, height 5' 3" (1.6 m), weight 67.8 kg (149 lb 7.6 oz), last menstrual period 01/27/2002, SpO2 98 %, not currently breastfeeding.  Vital signs are normal.    Lungs:  Normal effort and normal respiratory rate.  Breath sounds clear to auscultation.    Heart: Normal rate.  Regular rhythm.  No murmur.   Extremities: There is no dependent edema.    Neurological: " Patient is alert and oriented to person, place and time.    Skin:  Warm and dry.  No rash.       Current Consults:  IP CONSULT TO CARDIOLOGY  IP CONSULT TO CARDIOTHORACIC SURGERY    Assessment/Plan:  Assessment:   NSTEMI- severe 3 vessel disease, awaiting bypass  HTN/HLP  Tobacco abuse  Hematuria     Plan:   Plan for bypass per CTS, awaiting recs on timing  Consult urology for hematuria

## 2019-08-28 NOTE — PROGRESS NOTES
Please 1) call lab and ask them to change the ua, ua zeeshan and culture to voided not cath  2) obtain a cath urine and send for urinalysis, ua microscopic AND culture (not reflexive).  Cytology can remain voided specimen.  3) record catheterized amount (drain bladder)    I will see pt on Friday but if she leaves before then contact me for follow-up   She needs to be seen, would likely make f/u with partner who does percutaneous nephrolithotomy

## 2019-08-28 NOTE — PROGRESS NOTES
Cardiac Rehab     Nancy Phillipsn   [id number]   8/28/2019         Cardiac Rehab Phase Taught: Phase 1    Teaching Method: Verbal, Written    Handouts: Pre-Op CABG Booklet    Educational Videos: None    Understanding:  Learning indicated by feedback and Verbalize understanding    Comments: Preop CABG education provided. All questions answered.            Minnie Glez RN

## 2019-08-28 NOTE — PLAN OF CARE
Problem: Adult Inpatient Plan of Care  Goal: Plan of Care Review  Outcome: Ongoing (interventions implemented as appropriate)     08/28/19 7676   Plan of Care Review   Plan of Care Reviewed With patient   Progress improving     Pt improving, no chest pain or SOB. Optimistic about surgery and recovery. Urology consulted and tests completed. Pt continues to have bloody urine, no complaints of pain. Good PO intake. Pt is injury free and voices understanding of need to call for assistance before getting out of bed. Angio site to right radial dressing CDI.

## 2019-08-28 NOTE — PROGRESS NOTES
She had CP and came to ER on 8/25, found to have NSTEMI, had angiogram yesterday 8/27 and plan is for CABG with . She started having GH 8/27 prior to the angiogram. Currently on asa 81mg only. CABG planned. Urology consulted for gross hematuria (which is new). She has a h/o a stone extracted over 5 years ago.    I ordered a ctu which showed a large staghorn in her right lower pole, some stones in her left kidney and a voided urine which showed 3+leuk/3+blood, >100 rbc and >100 wbc. No sx of uti and no flank pain. Spoke with nurse and urine is reddish clear.     Please   1) call lab and ask them to change the ua, ua zeeshan and culture to voided not cath    2) obtain a cath urine and send for urinalysis, ua microscopic AND culture (not reflexive).  Cytology can remain voided specimen.    3) record catheterized amount (drain bladder), does not have to void prior     I will see pt on Friday but if she leaves before then contact me for follow-up   She needs to be seen, would likely make f/u with partner who does percutaneous nephrolithotomy

## 2019-08-28 NOTE — TELEPHONE ENCOUNTER
That is only available if you open PACS viewing system or sign into a separate radiology system. .    I am not looking through this system. I am opening it through epic. It's vitrea viewer I open via the results tab on epic    If he is unsure, please ask him to see who can help us with this.

## 2019-08-28 NOTE — TELEPHONE ENCOUNTER
Spoke with Malka he states when you go to view images click on top right hand corner its saids 3D it creates sagittal and coronals for you.

## 2019-08-28 NOTE — NURSING
Received patient from heart center via wheelchair. Right radial angio site clean dry Oriented to room and hospital policies.

## 2019-08-28 NOTE — TELEPHONE ENCOUNTER
----- Message from Niharika Collier MD sent at 8/28/2019  4:34 PM CDT -----  Please call [facility] radiology and ask them to reformat the images to coronal and sagittal and upload to epic, only axials avaialble

## 2019-08-29 VITALS
OXYGEN SATURATION: 94 % | BODY MASS INDEX: 26.49 KG/M2 | HEART RATE: 74 BPM | TEMPERATURE: 99 F | WEIGHT: 149.5 LBS | RESPIRATION RATE: 37 BRPM | SYSTOLIC BLOOD PRESSURE: 118 MMHG | HEIGHT: 63 IN | DIASTOLIC BLOOD PRESSURE: 62 MMHG

## 2019-08-29 DIAGNOSIS — I25.10 ATHEROSCLEROSIS OF NATIVE CORONARY ARTERY OF NATIVE HEART, ANGINA PRESENCE UNSPECIFIED: Primary | ICD-10-CM

## 2019-08-29 LAB
ALBUMIN SERPL BCP-MCNC: 4.4 G/DL (ref 3.5–5.2)
ALP SERPL-CCNC: 104 U/L (ref 55–135)
ALT SERPL W/O P-5'-P-CCNC: 30 U/L (ref 10–44)
ANION GAP SERPL CALC-SCNC: 7 MMOL/L (ref 8–16)
ANION GAP SERPL CALC-SCNC: 8 MMOL/L (ref 8–16)
ANION GAP SERPL CALC-SCNC: 8 MMOL/L (ref 8–16)
AST SERPL-CCNC: 23 U/L (ref 10–40)
BASOPHILS # BLD AUTO: 0.04 K/UL (ref 0–0.2)
BASOPHILS # BLD AUTO: 0.04 K/UL (ref 0–0.2)
BASOPHILS NFR BLD: 0.4 % (ref 0–1.9)
BASOPHILS NFR BLD: 0.4 % (ref 0–1.9)
BILIRUB SERPL-MCNC: 1.5 MG/DL (ref 0.1–1)
BUN SERPL-MCNC: 36 MG/DL (ref 8–23)
BUN SERPL-MCNC: 37 MG/DL (ref 8–23)
BUN SERPL-MCNC: 37 MG/DL (ref 8–23)
CALCIUM SERPL-MCNC: 10.6 MG/DL (ref 8.7–10.5)
CALCIUM SERPL-MCNC: 10.6 MG/DL (ref 8.7–10.5)
CALCIUM SERPL-MCNC: 11.5 MG/DL (ref 8.7–10.5)
CHLORIDE SERPL-SCNC: 105 MMOL/L (ref 95–110)
CHLORIDE SERPL-SCNC: 105 MMOL/L (ref 95–110)
CHLORIDE SERPL-SCNC: 106 MMOL/L (ref 95–110)
CK MB SERPL-MCNC: 1.3 NG/ML (ref 0.1–6.5)
CK SERPL-CCNC: 64 U/L (ref 20–180)
CO2 SERPL-SCNC: 25 MMOL/L (ref 23–29)
CREAT SERPL-MCNC: 0.8 MG/DL (ref 0.5–1.4)
CREAT SERPL-MCNC: 0.9 MG/DL (ref 0.5–1.4)
CREAT SERPL-MCNC: 0.9 MG/DL (ref 0.5–1.4)
DIFFERENTIAL METHOD: ABNORMAL
DIFFERENTIAL METHOD: ABNORMAL
EOSINOPHIL # BLD AUTO: 0.1 K/UL (ref 0–0.5)
EOSINOPHIL # BLD AUTO: 0.1 K/UL (ref 0–0.5)
EOSINOPHIL NFR BLD: 1.1 % (ref 0–8)
EOSINOPHIL NFR BLD: 1.1 % (ref 0–8)
ERYTHROCYTE [DISTWIDTH] IN BLOOD BY AUTOMATED COUNT: 11.6 % (ref 11.5–14.5)
ERYTHROCYTE [DISTWIDTH] IN BLOOD BY AUTOMATED COUNT: 11.6 % (ref 11.5–14.5)
EST. GFR  (AFRICAN AMERICAN): >60 ML/MIN/1.73 M^2
EST. GFR  (NON AFRICAN AMERICAN): >60 ML/MIN/1.73 M^2
GLUCOSE SERPL-MCNC: 108 MG/DL (ref 70–110)
GLUCOSE SERPL-MCNC: 108 MG/DL (ref 70–110)
GLUCOSE SERPL-MCNC: 125 MG/DL (ref 70–110)
HCT VFR BLD AUTO: 49.1 % (ref 37–48.5)
HCT VFR BLD AUTO: 49.1 % (ref 37–48.5)
HGB BLD-MCNC: 17.2 G/DL (ref 12–16)
HGB BLD-MCNC: 17.2 G/DL (ref 12–16)
IMM GRANULOCYTES # BLD AUTO: 0.04 K/UL (ref 0–0.04)
IMM GRANULOCYTES # BLD AUTO: 0.04 K/UL (ref 0–0.04)
IMM GRANULOCYTES NFR BLD AUTO: 0.4 % (ref 0–0.5)
IMM GRANULOCYTES NFR BLD AUTO: 0.4 % (ref 0–0.5)
LYMPHOCYTES # BLD AUTO: 2.9 K/UL (ref 1–4.8)
LYMPHOCYTES # BLD AUTO: 2.9 K/UL (ref 1–4.8)
LYMPHOCYTES NFR BLD: 30 % (ref 18–48)
LYMPHOCYTES NFR BLD: 30 % (ref 18–48)
MAGNESIUM SERPL-MCNC: 2 MG/DL (ref 1.6–2.6)
MAGNESIUM SERPL-MCNC: 2 MG/DL (ref 1.6–2.6)
MCH RBC QN AUTO: 33 PG (ref 27–31)
MCH RBC QN AUTO: 33 PG (ref 27–31)
MCHC RBC AUTO-ENTMCNC: 35 G/DL (ref 32–36)
MCHC RBC AUTO-ENTMCNC: 35 G/DL (ref 32–36)
MCV RBC AUTO: 94 FL (ref 82–98)
MCV RBC AUTO: 94 FL (ref 82–98)
MONOCYTES # BLD AUTO: 1 K/UL (ref 0.3–1)
MONOCYTES # BLD AUTO: 1 K/UL (ref 0.3–1)
MONOCYTES NFR BLD: 10.8 % (ref 4–15)
MONOCYTES NFR BLD: 10.8 % (ref 4–15)
NEUTROPHILS # BLD AUTO: 5.5 K/UL (ref 1.8–7.7)
NEUTROPHILS # BLD AUTO: 5.5 K/UL (ref 1.8–7.7)
NEUTROPHILS NFR BLD: 57.3 % (ref 38–73)
NEUTROPHILS NFR BLD: 57.3 % (ref 38–73)
NRBC BLD-RTO: 0 /100 WBC
NRBC BLD-RTO: 0 /100 WBC
PHOSPHATE SERPL-MCNC: 3.6 MG/DL (ref 2.7–4.5)
PLATELET # BLD AUTO: 219 K/UL (ref 150–350)
PLATELET # BLD AUTO: 219 K/UL (ref 150–350)
PMV BLD AUTO: 10.2 FL (ref 9.2–12.9)
PMV BLD AUTO: 10.2 FL (ref 9.2–12.9)
POTASSIUM SERPL-SCNC: 3.5 MMOL/L (ref 3.5–5.1)
POTASSIUM SERPL-SCNC: 3.5 MMOL/L (ref 3.5–5.1)
POTASSIUM SERPL-SCNC: 4.2 MMOL/L (ref 3.5–5.1)
PROT SERPL-MCNC: 7.5 G/DL (ref 6–8.4)
RBC # BLD AUTO: 5.22 M/UL (ref 4–5.4)
RBC # BLD AUTO: 5.22 M/UL (ref 4–5.4)
SODIUM SERPL-SCNC: 138 MMOL/L (ref 136–145)
TROPONIN I SERPL DL<=0.01 NG/ML-MCNC: 0.15 NG/ML (ref 0.02–0.04)
WBC # BLD AUTO: 9.51 K/UL (ref 3.9–12.7)
WBC # BLD AUTO: 9.51 K/UL (ref 3.9–12.7)

## 2019-08-29 PROCEDURE — 93005 ELECTROCARDIOGRAM TRACING: CPT

## 2019-08-29 PROCEDURE — 82553 CREATINE MB FRACTION: CPT

## 2019-08-29 PROCEDURE — 83735 ASSAY OF MAGNESIUM: CPT | Mod: 91

## 2019-08-29 PROCEDURE — 25000003 PHARM REV CODE 250: Performed by: INTERNAL MEDICINE

## 2019-08-29 PROCEDURE — 94761 N-INVAS EAR/PLS OXIMETRY MLT: CPT

## 2019-08-29 PROCEDURE — 63600175 PHARM REV CODE 636 W HCPCS: Performed by: INTERNAL MEDICINE

## 2019-08-29 PROCEDURE — 83735 ASSAY OF MAGNESIUM: CPT

## 2019-08-29 PROCEDURE — 84484 ASSAY OF TROPONIN QUANT: CPT

## 2019-08-29 PROCEDURE — 80053 COMPREHEN METABOLIC PANEL: CPT

## 2019-08-29 PROCEDURE — 85025 COMPLETE CBC W/AUTO DIFF WBC: CPT

## 2019-08-29 PROCEDURE — 36415 COLL VENOUS BLD VENIPUNCTURE: CPT

## 2019-08-29 PROCEDURE — 80048 BASIC METABOLIC PNL TOTAL CA: CPT

## 2019-08-29 PROCEDURE — 82550 ASSAY OF CK (CPK): CPT

## 2019-08-29 PROCEDURE — S4991 NICOTINE PATCH NONLEGEND: HCPCS | Performed by: INTERNAL MEDICINE

## 2019-08-29 PROCEDURE — 84100 ASSAY OF PHOSPHORUS: CPT

## 2019-08-29 RX ORDER — CALCIUM CARBONATE 200(500)MG
1000 TABLET,CHEWABLE ORAL EVERY 6 HOURS PRN
Status: DISCONTINUED | OUTPATIENT
Start: 2019-08-29 | End: 2019-08-29 | Stop reason: HOSPADM

## 2019-08-29 RX ORDER — IBUPROFEN 200 MG
1 TABLET ORAL DAILY
Refills: 0 | COMMUNITY
Start: 2019-08-30 | End: 2019-09-04

## 2019-08-29 RX ORDER — METOPROLOL SUCCINATE 25 MG/1
12.5 TABLET, EXTENDED RELEASE ORAL DAILY
Qty: 15 TABLET | Refills: 11 | Status: SHIPPED | OUTPATIENT
Start: 2019-08-30 | End: 2019-09-27 | Stop reason: DRUGHIGH

## 2019-08-29 RX ORDER — ASPIRIN 81 MG/1
81 TABLET ORAL DAILY
Refills: 0 | COMMUNITY
Start: 2019-08-30 | End: 2020-08-29

## 2019-08-29 RX ADMIN — CALCIUM CARBONATE (ANTACID) CHEW TAB 500 MG 1000 MG: 500 CHEW TAB at 09:08

## 2019-08-29 RX ADMIN — NICOTINE 1 PATCH: 14 PATCH, EXTENDED RELEASE TRANSDERMAL at 09:08

## 2019-08-29 RX ADMIN — CALCIUM CARBONATE (ANTACID) CHEW TAB 500 MG 1000 MG: 500 CHEW TAB at 03:08

## 2019-08-29 RX ADMIN — ALUMINUM HYDROXIDE, MAGNESIUM HYDROXIDE, AND SIMETHICONE 50 ML: 200; 200; 20 SUSPENSION ORAL at 01:08

## 2019-08-29 RX ADMIN — ONDANSETRON 4 MG: 2 INJECTION INTRAMUSCULAR; INTRAVENOUS at 10:08

## 2019-08-29 RX ADMIN — TRIAMTERENE AND HYDROCHLOROTHIAZIDE 1 TABLET: 37.5; 25 TABLET ORAL at 08:08

## 2019-08-29 RX ADMIN — ASPIRIN 81 MG: 81 TABLET, COATED ORAL at 08:08

## 2019-08-29 RX ADMIN — METOPROLOL SUCCINATE 12.5 MG: 25 TABLET, EXTENDED RELEASE ORAL at 08:08

## 2019-08-29 RX ADMIN — POTASSIUM CHLORIDE 40 MEQ: 20 SOLUTION ORAL at 08:08

## 2019-08-29 NOTE — PROGRESS NOTES
Cardiac Rehab     Nancy Phillipsn   [id number]   8/29/2019         Cardiac Rehab Phase Taught: Phase 1    Teaching Method: Verbal    Handouts: None    Educational Videos: None    Understanding:  History of Previous Information Given    Comments: Pt c/o persistent heartburn. Informed bedside RN and Dr Pham. Will follow. CABG not on schedule yet.            Minnie Glez RN

## 2019-08-29 NOTE — PROGRESS NOTES
[place]    Cardiology Progress Note    Subjective:  Seen and examined this AM. CTS plans for CABG next week. VSS. H/H stable. Urology consulted. CT urogram demonstrated bilateral renal stones. Denies any active complaints.       Objective:  Vital Signs (Most Recent)  Temp: 97.8 °F (36.6 °C) (08/29/19 0706)  Pulse: 78 (08/29/19 0841)  Resp: (!) 22 (08/29/19 0841)  BP: 117/64 (08/29/19 0706)  SpO2: 95 % (08/29/19 0841)    Vital Signs Range (Last 24H):  Temp:  [97.8 °F (36.6 °C)-99.1 °F (37.3 °C)]   Pulse:  [65-96]   Resp:  [18-41]   BP: (105-117)/(62-64)   SpO2:  [94 %-96 %]     I & O (Last 24H):    Intake/Output Summary (Last 24 hours) at 8/29/2019 1122  Last data filed at 8/29/2019 0925  Gross per 24 hour   Intake 480 ml   Output 250 ml   Net 230 ml       Current Diet:     Current Diet Order   Procedures    Diet Cardiac        Allergies:  Review of patient's allergies indicates:   Allergen Reactions    Pineapple Anaphylaxis       Meds:  Scheduled Meds:   aspirin  81 mg Oral Daily    atorvastatin  40 mg Oral QHS    metoprolol succinate  12.5 mg Oral Daily    nicotine  1 patch Transdermal Daily    polyethylene glycol  17 g Oral Daily    triamterene-hydrochlorothiazide 37.5-25 mg  1 tablet Oral QAM     Continuous Infusions:  PRN Meds:acetaminophen, acetaminophen, calcium carbonate, glucose, glucose, magnesium oxide, magnesium oxide, ondansetron, oxyCODONE-acetaminophen, potassium chloride 10%, potassium chloride 10%, potassium chloride 10%, potassium, sodium phosphates, potassium, sodium phosphates, potassium, sodium phosphates, promethazine (PHENERGAN) IVPB, ramelteon, sodium chloride 0.9%    Lab Results :  Recent Results (from the past 24 hour(s))   Urinalysis, Reflex to Urine Culture Urine, Catheterized    Collection Time: 08/28/19  5:35 PM   Result Value Ref Range    Specimen UA Urine, Clean Catch     Color, UA Colorless (A) Yellow, Straw, Oliva    Appearance, UA Clear Clear    pH, UA 7.0 5.0  - 8.0    Specific Gravity, UA 1.020 1.005 - 1.030    Protein, UA Negative Negative    Glucose, UA Negative Negative    Ketones, UA Negative Negative    Bilirubin (UA) Negative Negative    Occult Blood UA 2+ (A) Negative    Nitrite, UA Negative Negative    Urobilinogen, UA Negative Negative EU/dL    Leukocytes, UA 2+ (A) Negative   Urine Culture High Risk    Collection Time: 08/28/19  5:35 PM   Result Value Ref Range    Urine Culture, Routine (A)      GRAM NEGATIVE WILFREDO, NON-LACTOSE   10,000 - 49,999 cfu/ml  Identification and susceptibility pending     Urinalysis Microscopic    Collection Time: 08/28/19  5:35 PM   Result Value Ref Range    RBC, UA >100 (H) 0 - 4 /hpf    WBC, UA 15 (H) 0 - 5 /hpf    Bacteria Rare None-Occ /hpf    Squam Epithel, UA 2 /hpf    Hyaline Casts, UA 1 0-1/lpf /lpf    Microscopic Comment SEE COMMENT    Comprehensive Metabolic Panel (CMP)    Collection Time: 08/29/19  3:25 AM   Result Value Ref Range    Sodium 138 136 - 145 mmol/L    Potassium 3.5 3.5 - 5.1 mmol/L    Chloride 105 95 - 110 mmol/L    CO2 25 23 - 29 mmol/L    Glucose 108 70 - 110 mg/dL    BUN, Bld 37 (H) 8 - 23 mg/dL    Creatinine 0.9 0.5 - 1.4 mg/dL    Calcium 10.6 (H) 8.7 - 10.5 mg/dL    Total Protein 7.5 6.0 - 8.4 g/dL    Albumin 4.4 3.5 - 5.2 g/dL    Total Bilirubin 1.5 (H) 0.1 - 1.0 mg/dL    Alkaline Phosphatase 104 55 - 135 U/L    AST 23 10 - 40 U/L    ALT 30 10 - 44 U/L    Anion Gap 8 8 - 16 mmol/L    eGFR if African American >60.0 >60 mL/min/1.73 m^2    eGFR if non African American >60.0 >60 mL/min/1.73 m^2   Magnesium    Collection Time: 08/29/19  3:25 AM   Result Value Ref Range    Magnesium 2.0 1.6 - 2.6 mg/dL   Phosphorus    Collection Time: 08/29/19  3:25 AM   Result Value Ref Range    Phosphorus 3.6 2.7 - 4.5 mg/dL   CBC with Automated Differential    Collection Time: 08/29/19  3:25 AM   Result Value Ref Range    WBC 9.51 3.90 - 12.70 K/uL    RBC 5.22 4.00 - 5.40 M/uL    Hemoglobin 17.2 (H) 12.0 - 16.0 g/dL     Hematocrit 49.1 (H) 37.0 - 48.5 %    Mean Corpuscular Volume 94 82 - 98 fL    Mean Corpuscular Hemoglobin 33.0 (H) 27.0 - 31.0 pg    Mean Corpuscular Hemoglobin Conc 35.0 32.0 - 36.0 g/dL    RDW 11.6 11.5 - 14.5 %    Platelets 219 150 - 350 K/uL    MPV 10.2 9.2 - 12.9 fL    Immature Granulocytes 0.4 0.0 - 0.5 %    Gran # (ANC) 5.5 1.8 - 7.7 K/uL    Immature Grans (Abs) 0.04 0.00 - 0.04 K/uL    Lymph # 2.9 1.0 - 4.8 K/uL    Mono # 1.0 0.3 - 1.0 K/uL    Eos # 0.1 0.0 - 0.5 K/uL    Baso # 0.04 0.00 - 0.20 K/uL    nRBC 0 0 /100 WBC    Gran% 57.3 38.0 - 73.0 %    Lymph% 30.0 18.0 - 48.0 %    Mono% 10.8 4.0 - 15.0 %    Eosinophil% 1.1 0.0 - 8.0 %    Basophil% 0.4 0.0 - 1.9 %    Differential Method Automated    CBC auto differential    Collection Time: 08/29/19  3:25 AM   Result Value Ref Range    WBC 9.51 3.90 - 12.70 K/uL    RBC 5.22 4.00 - 5.40 M/uL    Hemoglobin 17.2 (H) 12.0 - 16.0 g/dL    Hematocrit 49.1 (H) 37.0 - 48.5 %    Mean Corpuscular Volume 94 82 - 98 fL    Mean Corpuscular Hemoglobin 33.0 (H) 27.0 - 31.0 pg    Mean Corpuscular Hemoglobin Conc 35.0 32.0 - 36.0 g/dL    RDW 11.6 11.5 - 14.5 %    Platelets 219 150 - 350 K/uL    MPV 10.2 9.2 - 12.9 fL    Immature Granulocytes 0.4 0.0 - 0.5 %    Gran # (ANC) 5.5 1.8 - 7.7 K/uL    Immature Grans (Abs) 0.04 0.00 - 0.04 K/uL    Lymph # 2.9 1.0 - 4.8 K/uL    Mono # 1.0 0.3 - 1.0 K/uL    Eos # 0.1 0.0 - 0.5 K/uL    Baso # 0.04 0.00 - 0.20 K/uL    nRBC 0 0 /100 WBC    Gran% 57.3 38.0 - 73.0 %    Lymph% 30.0 18.0 - 48.0 %    Mono% 10.8 4.0 - 15.0 %    Eosinophil% 1.1 0.0 - 8.0 %    Basophil% 0.4 0.0 - 1.9 %    Differential Method Automated    Basic metabolic panel    Collection Time: 08/29/19  3:25 AM   Result Value Ref Range    Sodium 138 136 - 145 mmol/L    Potassium 3.5 3.5 - 5.1 mmol/L    Chloride 105 95 - 110 mmol/L    CO2 25 23 - 29 mmol/L    Glucose 108 70 - 110 mg/dL    BUN, Bld 37 (H) 8 - 23 mg/dL    Creatinine 0.9 0.5 - 1.4 mg/dL    Calcium 10.6 (H) 8.7 -  "10.5 mg/dL    Anion Gap 8 8 - 16 mmol/L    eGFR if African American >60.0 >60 mL/min/1.73 m^2    eGFR if non African American >60.0 >60 mL/min/1.73 m^2         Physical Exam:  Objective:  General Appearance:  Comfortable and well-appearing.    Vital signs: (most recent): Blood pressure 117/64, pulse 78, temperature 97.8 °F (36.6 °C), temperature source Oral, resp. rate (!) 22, height 5' 3" (1.6 m), weight 67.8 kg (149 lb 7.6 oz), last menstrual period 01/27/2002, SpO2 95 %, not currently breastfeeding.  Vital signs are normal.    Lungs:  Normal effort and normal respiratory rate.  Breath sounds clear to auscultation.    Heart: Normal rate.  Regular rhythm.  No murmur.   Extremities: There is no dependent edema.    Neurological: Patient is alert and oriented to person, place and time.    Skin:  Warm and dry.  No rash.       Current Consults:  IP CONSULT TO CARDIOLOGY  IP CONSULT TO CARDIOTHORACIC SURGERY  IP CONSULT TO UROLOGY    Assessment/Plan:  Assessment:   NSTEMI- severe 3 vessel disease, awaiting bypass  HTN/HLP  Tobacco abuse  Hematuria     Plan:   Plan for bypass per CTS next week. Clear for discharge from cardiac standpoint.   Urology on board.     Jude Guerra PA-C  08/29/19  "

## 2019-08-29 NOTE — PROGRESS NOTES
I spoke to the patient about coronary artery bypass grafting this morning.  The plan for CABG will be early next week.  I explained this to the patient.  We can arrange for her to return for the procedure if she is discharged today.

## 2019-08-29 NOTE — PROGRESS NOTES
"[facility]  Adult Nutrition  Progress Note    SUMMARY       Recommendations    Recommendation/Intervention: 1. Continue current diet as tolerated, encourage intake   Goals: Pt will meet >75% of estimtaed energy and protein needs   Nutrition Goal Status: new    Reason for Assessment    Reason For Assessment: length of stay  Relevant Medical History: CAD, HTN,HLD, MI  Interdisciplinary Rounds: attended    Nutrition Risk Screen    Nutrition Risk Screen: no indicators present    Nutrition/Diet History    Patient Reported Diet/Restrictions/Preferences: general  Spiritual, Cultural Beliefs, Jainism Practices, Values that Affect Care: yes  Food Allergies: other (see comments)(Pinneapple )  Factors Affecting Nutritional Intake: reflux    Anthropometrics    Temp: 97.8 °F (36.6 °C)  Height Method: Stated  Height: 5' 3" (160 cm)  Height (inches): 63 in  Weight Method: Standard Scale  Weight: 67.8 kg (149 lb 7.6 oz)  Weight (lb): 149.47 lb  Ideal Body Weight (IBW), Female: 115 lb  % Ideal Body Weight, Female (lb): 135.16 lb  BMI (Calculated): 27.6  BMI Grade: 25 - 29.9 - overweight     Lab/Procedures/Meds    Pertinent Labs Reviewed: reviewed   8/29/2019 03:25   Sodium 138   Potassium 3.5   Chloride 105   CO2 25   Anion Gap 8   BUN, Bld 37 (H)   Creatinine 0.9   eGFR if non African American >60.0   eGFR if African American >60.0   Glucose 108   Calcium 10.6 (H)     Hemoglobin 17.2 (H)   Hematocrit 49.1 (H)     Pertinent Medications Reviewed: reviewed  Scheduled Meds:   aspirin  81 mg Oral Daily    atorvastatin  40 mg Oral QHS    metoprolol succinate  12.5 mg Oral Daily    nicotine  1 patch Transdermal Daily    polyethylene glycol  17 g Oral Daily    triamterene-hydrochlorothiazide 37.5-25 mg  1 tablet Oral QAM     Estimated/Assessed Needs    Weight Used For Calorie Calculations: 67.6 kg (149 lb)  Energy Calorie Requirements (kcal): 6273-1824 kcal/day  Energy Need Method: Kcal/kg  Protein Requirements: "  g/day   Weight Used For Protein Calculations: 67.6 kg (149 lb)     Estimated Fluid Requirement Method: RDA Method  RDA Method (mL): 1351     Nutrition Prescription Ordered    Current Diet Order: Cardiac Diet     Evaluation of Received Nutrient/Fluid Intake    Energy Calories Required: not meeting needs  Protein Required: not meeting needs  Fluid Required: not meeting needs  Tolerance: tolerating  % Intake of Estimated Energy Needs: 50 - 75 %  % Meal Intake: 50 - 75 %    Nutrition Risk    Level of Risk/Frequency of Follow-up: moderate     Assessment and Plan    Pt had a good appetite prior to admit. States that she has been having issues with nausea and vomiting for the past 3-4 days. Not recent weight loss, difficulty swallowing or decrease in appetite. Experienced heartburn this morning and last night due to eating a hamburger for dinner. RD to monitor intake, labs, and diet changes.     Monitor and Evaluation    Food and Nutrient Intake: energy intake, food and beverage intake  Food and Nutrient Adminstration: diet order  Physical Activity and Function: nutrition-related ADLs and IADLs  Anthropometric Measurements: weight, weight change  Biochemical Data, Medical Tests and Procedures: electrolyte and renal panel, inflammatory profile, lipid profile, gastrointestinal profile, glucose/endocrine profile  Nutrition-Focused Physical Findings: overall appearance     Nutrition Follow-Up    RD Follow-up?: Yes  Lauren Maher   08/29/2019  12:01 PM

## 2019-08-29 NOTE — DISCHARGE SUMMARY
[facility]  Discharge Summary      Patient Name: Nancy Muñoz  MRN: [id number]  Admission Date: 8/25/2019  Hospital Length of Stay: 3 days  Discharge Date and Time:  08/29/2019 3:05 PM  Attending Physician: Reji Soto MD   Discharging Provider: Reji Soto MD  Primary Care Provider: Jan Petersen MD      HPI:   63-year-old white female transferred from [facility] ER with diagnosis of NSTEMI.  Patient has a known history of hypertension, hyperlipidemia, degenerative disc disease. The patient informed that she developed chest pressure associated with left shoulder brain, left jaw pain, left arm pain on Thursday.  Patient report by Friday she developed severe nausea and vomiting with upper abdominal pain. 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  Patient states she remained in bed Thursday Friday and Saturday.  Patient states on Friday 1 of her friend checked on her and which is min she described her symptom the friend suggested she may be having a heart attack.  Patient reports she go good the symptoms and she was surprised that she may be having a heart attack but she thought it might have passed already.  Today patient presented to the ER where her troponins were found to be at 2.7, her EKG showed Q-waves in lead 3 and AVF.  Her BNP was normal, her creatinine is normal.  ER also did the chest CTA which was negative for PE.  Patient was given aspirin and nitro paste.  Patient reports she does not have any chest pain or chest heaviness anymore.  The patient denies shortness of breath either.  Did discuss the findings and informed that we will admit her as observation.  And that I will contact Dr. Pham regarding the procedure if it is going to be scheduled for Monday or Tuesday.  Patient reports she is very hungry and if she can eat.  Will start cardiac diet for now  Discussed will  order echocardiogram tomorrow morning as well    Procedure(s) (LRB):  ANGIOGRAM, CORONARY ARTERY (Left)  PTCA, SINGLE VESSEL (Right)  Angiogram, Coronary, with Left Heart Cath      Hospital Course:   63-year-old white female admitted 08/25/2019 as a transfer from [facility] with diagnosis of NSTEMI.  Patient reported left shoulder pain left arm pain and left jaw pain that started Thursday.  Patient also had nausea and vomiting that day.  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  Reports she stayed home 3 days and on Sunday when she developed shortness of breath she presented to the ER where she was found to have elevated troponin levels.  Patient was transferred to [facility] for NSTEMI and left heart catheterization.  Cardiac enzymes trended, Lovenox given, aspirin started, statin started, blood pressure controlled.  Patient was admitted for left heart catheterization to be scheduled for 08/27/2019.  During the hospital stay patient remained asymptomatic but did reported off and on shortness of breath.  Denies chest pain.  Patient to be NPO after midnight.  Discharge will be planned according to her angiogram findings     Patient underwent cardiac catheterization during this hospitalization, she is not amenable to stent placement.  She has a CABG candidate.  However due to schedule, the patient's surgery can be done on the next week.  Considering the patient has been stable, Cardiology, and Cardiothoracic surgery decided to discharge the patient at this time.  The patient will be discharged on statin, aspirin, and metoprolol, she has been advised to avoid any kind of effort.  She will follow up with Cardiology, next week, for CABG.  I discussed the discharge plan the patient, and she voiced understanding and agreement.  Discharge time 31 min  Consults:   Consults (From admission, onward)        Status Ordering Provider     Inpatient consult to  Cardiology  Once     Provider:  Soniya Pham MD    Completed FIDELIA BARNES     Inpatient consult to Cardiothoracic Surgery  Once     Provider:  Freddy Julien MD    Completed SONIYA PHAM     Inpatient consult to Urology  Once     Provider:  Niharika Collier MD    Acknowledged EBONI LARSEN        Physical examination  General, no acute distress  Lungs clear to auscultation bilaterally  Cardiac regular rhythm and rate, 2 6 systolic murmur  Abdomen, soft nontender nondistended  Extremities:  No calf tenderness no edema    No new Assessment & Plan notes have been filed under this hospital service since the last note was generated.  Service: Hospital Medicine    Final Active Diagnoses:    Diagnosis Date Noted POA    PRINCIPAL PROBLEM:  NSTEMI (non-ST elevated myocardial infarction) [I21.4] 08/25/2019 Yes    Hypertension [I10] 02/23/2018 Yes      Problems Resolved During this Admission:       Discharged Condition: fair    Disposition: Home or Self Care    Follow Up:  Follow-up Information     Follow up In 3 days.    Why:  cardiology in 3 days           Jan Petersen MD.    Specialty:  Family Medicine  Contact information:  [address] [facility] [place] [address]  [phone]             Freddy Julien MD.    Specialties:  Cardiothoracic Surgery, Vascular Surgery, Cardiovascular Disease, Cardiology  Contact information:  [address]  [place] [address]  [phone]             Niharika Collier MD.    Specialty:  Urology  Contact information:  [address] [place] [address] [address]  [address]  [place] [address]  [phone]                 Patient Instructions:      Lifting restrictions   Order Comments: No more than 5 pounds, no strenuous activity       Significant Diagnostic Studies: Labs:   BMP:   Recent Labs   Lab 08/28/19  0343 08/29/19  0325     105 108  108     143 138  138   K 3.7  3.7 3.5  3.5     110 105  105   CO2 26  26 25  25   BUN 33*  33* 37*  37*    CREATININE 0.7  0.7 0.9  0.9   CALCIUM 10.6*  10.6* 10.6*  10.6*   MG 1.8 2.0    and CBC   Recent Labs   Lab 08/28/19  0343 08/29/19  0325   WBC 7.94  7.94 9.51  9.51   HGB 15.7  15.7 17.2*  17.2*   HCT 46.2  46.2 49.1*  49.1*     173 219  219       Pending Diagnostic Studies:     Procedure Component Value Units Date/Time    Basic metabolic panel [[id number]] Collected:  08/29/19 1330    Order Status:  Sent Lab Status:  In process Updated:  08/29/19 1449    Specimen:  Blood     CK [[id number]] Collected:  08/29/19 1330    Order Status:  Sent Lab Status:  In process Updated:  08/29/19 1449    Specimen:  Blood     CK-MB [[id number]] Collected:  08/29/19 1330    Order Status:  Sent Lab Status:  In process Updated:  08/29/19 1449    Specimen:  Blood     Magnesium [[id number]] Collected:  08/29/19 1330    Order Status:  Sent Lab Status:  In process Updated:  08/29/19 1449    Specimen:  Blood     Troponin I [[id number]] Collected:  08/29/19 1330    Order Status:  Sent Lab Status:  In process Updated:  08/29/19 1449    Specimen:  Blood          Medications:  Reconciled Home Medications:      Medication List      START taking these medications    aspirin 81 MG EC tablet  Commonly known as:  ECOTRIN  Take 1 tablet (81 mg total) by mouth once daily.  Start taking on:  8/30/2019     metoprolol succinate 25 MG 24 hr tablet  Commonly known as:  TOPROL-XL  Take 0.5 tablets (12.5 mg total) by mouth once daily.  Start taking on:  8/30/2019     nicotine 14 mg/24 hr  Commonly known as:  NICODERM CQ  Place 1 patch onto the skin once daily.  Start taking on:  8/30/2019        CONTINUE taking these medications    atorvastatin 10 MG tablet  Commonly known as:  LIPITOR  Take 10 mg by mouth once daily.     BENADRYL 25 mg capsule  Generic drug:  diphenhydrAMINE  Take by mouth.     metaxalone 800 MG tablet  Commonly known as:  SKELAXIN  Take 800 mg by mouth nightly.     oxyCODONE-acetaminophen  mg per tablet  Commonly  known as:  PERCOCET  Take 1 tablet by mouth every 8 (eight) hours as needed for Pain. Sometimes doubles dose to cover pain     PROAIR HFA 90 mcg/actuation inhaler  Generic drug:  albuterol  Inhale 2 puffs into the lungs every 6 (six) hours as needed for Wheezing. Rescue     triamterene-hydrochlorothiazide 37.5-25 mg 37.5-25 mg per capsule  Commonly known as:  DYAZIDE            Indwelling Lines/Drains at time of discharge:   Lines/Drains/Airways          None          Time spent on the discharge of patient: 31 minutes  Patient was seen and examined on the date of discharge and determined to be suitable for discharge.         Reji Soto MD  Department of Hospital Medicine  [facility]

## 2019-08-29 NOTE — PROGRESS NOTES
"Patient c/o persistent "heartburn". Will check ekg, CE. GI cocktail. VSS.    - EKG no acute changes, q wave III, aVF.  "

## 2019-08-30 ENCOUNTER — TELEPHONE (OUTPATIENT)
Dept: VASCULAR SURGERY | Facility: CLINIC | Age: 64
End: 2019-08-30

## 2019-08-30 ENCOUNTER — TELEPHONE (OUTPATIENT)
Dept: UROLOGY | Facility: CLINIC | Age: 64
End: 2019-08-30

## 2019-08-30 LAB
BACTERIA UR CULT: ABNORMAL
BACTERIA UR CULT: ABNORMAL

## 2019-08-30 RX ORDER — AMOXICILLIN AND CLAVULANATE POTASSIUM 875; 125 MG/1; MG/1
1 TABLET, FILM COATED ORAL 2 TIMES DAILY
Qty: 10 TABLET | Refills: 0 | Status: ON HOLD | OUTPATIENT
Start: 2019-08-30 | End: 2019-09-10 | Stop reason: HOSPADM

## 2019-08-30 NOTE — TELEPHONE ENCOUNTER
When pt calls back go ahead and make a new pt appointment with me. Ct reviewed and stone in lower pole is 1cm. Also confirm that she's on abx.

## 2019-08-30 NOTE — TELEPHONE ENCOUNTER
----- Message from Dorothy Corona sent at 8/30/2019  2:52 PM CDT -----  Contact: [phone]  Patient is requesting a call back from the nurse stated she have a bladder infection, will it interfere with her surgery.    Please call the patient upon request at phone number [phone].

## 2019-08-30 NOTE — TELEPHONE ENCOUNTER
Pt had gross hematuria, large stahorn in RLP and uti.   Let her know she will need to have f/u with us.   We will call back with this once I see who can see her for her kidney stone.   Also let her know she has a uti and make sure she was sent home on at least 7 days antibiotics.   Find out which abx she was sent home on.

## 2019-08-30 NOTE — TELEPHONE ENCOUNTER
Patient did not get any abx when she was discharged.  Please send to pharmacy on file.      She is scheduled for cabg 9/5  Scheduled with [facility] 9/26.

## 2019-09-04 ENCOUNTER — HOSPITAL ENCOUNTER (OUTPATIENT)
Dept: PREADMISSION TESTING | Facility: HOSPITAL | Age: 64
Discharge: HOME | DRG: 235 | End: 2019-09-04
Attending: THORACIC SURGERY (CARDIOTHORACIC VASCULAR SURGERY)
Payer: COMMERCIAL

## 2019-09-04 VITALS
TEMPERATURE: 98 F | WEIGHT: 153.56 LBS | OXYGEN SATURATION: 98 % | HEIGHT: 64 IN | BODY MASS INDEX: 26.21 KG/M2 | RESPIRATION RATE: 16 BRPM | HEART RATE: 76 BPM | SYSTOLIC BLOOD PRESSURE: 143 MMHG | DIASTOLIC BLOOD PRESSURE: 82 MMHG

## 2019-09-04 DIAGNOSIS — Z01.818 PRE-OP TESTING: ICD-10-CM

## 2019-09-04 DIAGNOSIS — I25.110 ATHEROSCLEROTIC HEART DISEASE OF NATIVE CORONARY ARTERY WITH UNSTABLE ANGINA PECTORIS: Primary | ICD-10-CM

## 2019-09-04 PROCEDURE — 86920 COMPATIBILITY TEST SPIN: CPT

## 2019-09-04 RX ORDER — CALCIUM CHLORIDE, MAGNESIUM CHLORIDE, POTASSIUM CHLORIDE AND SODIUM CHLORIDE 17.6; 325.3; 119.3; 643 MG/100ML; MG/100ML; MG/100ML; MG/100ML
SOLUTION INTRA-ARTERIAL ONCE
Status: DISCONTINUED | OUTPATIENT
Start: 2019-09-05 | End: 2019-09-05 | Stop reason: HOSPADM

## 2019-09-04 RX ORDER — CALCIUM CHLORIDE, MAGNESIUM CHLORIDE, POTASSIUM CHLORIDE AND SODIUM CHLORIDE 17.6; 325.3; 119.3; 643 MG/100ML; MG/100ML; MG/100ML; MG/100ML
SOLUTION INTRA-ARTERIAL ONCE
Status: CANCELLED | OUTPATIENT
Start: 2019-09-05

## 2019-09-04 NOTE — DISCHARGE INSTRUCTIONS
How to Quit Smoking  Smoking is one of the hardest habits to break. About half of all people who have ever smoked have been able to quit. Most people who still smoke want to quit. Here are some of the best ways to stop smoking.    Keep trying  Most smokers make many attempts at quitting before they are successful. Its important not to give up.  Go cold turkey  Most former smokers quit cold turkey (all at once). Trying to cut back gradually doesn't seem to work as well, perhaps because it continues the smoking habit. Also, it is possible to inhale more while smoking fewer cigarettes. This results in the same amount of nicotine in your body.  Get support  Support programs can be a big help, especially for heavy smokers. These groups offer lectures, ways to change behavior, and peer support. Here are some ways to find a support program:  · Free national quitline: 800-QUIT-NOW (801-714-6694).  · Hospital quit-smoking programs.  · American Lung Association: (280.228.4080).  · American Cancer Society (780-482-3405).  Support at home is important too. Nonsmokers can offer praise and encouragement. If the smoker in your life finds it hard to quit, encourage them to keep trying.  Over-the-counter medicines  Nicotine replacement therapy may make quitting easier. Certain aids, such as the nicotine patch, gum, and lozenges, are available without a prescription. It is best to use these under a doctors care, though. The skin patch provides a steady supply of nicotine. Nicotine gum and lozenges give temporary bursts of low levels of nicotine. Both methods reduce the craving for cigarettes. Warning: If you have nausea, vomiting, dizziness, weakness, or a fast heartbeat, stop using these products and see your doctor.  Prescription medicines  After reviewing your smoking patterns and past attempts to quit, your doctor may offer a prescription medicine such as bupropion, varenicline, a nicotine inhaler, or nasal spray. Each has  "advantages and side effects. Your doctor can review these with you.  Health benefits of quitting  The benefits of quitting start right away and keep improving the longer you go without smoking. These benefits occur at any age.  So whether you are 17 or 70, quitting is a good decision. Some of the benefits include:  · 20 minutes: Blood pressure and pulse return to normal.  · 8 hours: Oxygen levels return to normal.  · 2 days: Ability to smell and taste begin to improve as damaged nerves regrow.  · 2 to 3 weeks: Circulation and lung function improve.  · 1 to 9 months: Coughing, congestion, and shortness of breath decrease; tiredness decreases.  · 1 year: Risk of heart attack decreases by half.  · 5 years: Risk of lung cancer decreases by half; risk of stroke becomes the same as a nonsmokers.  For more on how to quit smoking, try these online resources:   · DyMynd.gov  · "Clearing the Air" booklet from the [employer]: [employer].gov/sites/default/files/pdf/clearing-the-air-accessible.pdf  Date Last Reviewed: 3/1/2017  © 7047-8319 [employer]. [address] [place] [address]. All rights reserved. This information is not intended as a substitute for professional medical care. Always follow your healthcare professional's instructions.        Incision Care  Remember: Follow-up visits allow your healthcare provider to make sure your incision is healing well. Be sure to keep your appointments.     Stitches (sutures), surgical staples, special strips of surgical tape, or surgical skin glue may be used to close incisions. They also help stop bleeding and speed healing. To help your incision heal, follow the tips on this handout.  Home care  Tips for home care include the following:  · Always wash your hands before touching your incision.  · Keep your incision clean and dry.  · Avoid doing things that could cause dirt or sweat to get on your incision.  · Dont pick at scabs. They help " protect the wound.  · Keep your incision out of water.  · Take a sponge bath to avoid getting your incision wet, unless your healthcare provider tells you otherwise.  · Ask your provider when can you take a shower or bathe.  · Ask your provider about the best way to keep your incision dry when bathing or showering.  · Pat stitches dry if they get wet. Dont rub.  · Leave the bandage (dressing) in place until you are told to remove it or change it. Change it only as directed, using clean hands.  · After the first 12 hours, change your dressing every 24 hours, or as directed by your healthcare provider.  · Change your dressing if it gets wet or soiled.  Care for specific closures  Follow these guidelines unless your healthcare provider tells you otherwise:  · Stitches or staples. Once you no longer need to keep these dry, clean the wound daily. First remove the bandage using clean hands. Then wash the area gently with soap and warm water. Use a wet cotton swab to loosen and remove any blood or crust that forms. After cleaning, put a thin layer of antibiotic ointment on. Then put on a new bandage.  · Skin glue. Dont put liquid, ointment, or cream on your wound while the glue is in place. Avoid activities that cause heavy sweating. Protect the wound from sunlight. Do not scratch, rub, or pick at the glue. Do not put tape directly over the glue. The glue should peel off within 5 to 10 days.  · Surgical tape. Keep the area dry. If it gets wet, blot the area dry with a clean towel. Surgical tape usually falls off within 7 to 10 days. If it has not fallen off after 10 days, contact your healthcare provider before taking it off yourself. If you are told to remove the tape, put mineral oil or petroleum jelly on a cotton ball. Gently rub the tape until it is removed.  Changing your dressing  Leave the dressing (bandage) in place until you are told to remove it or change it. Follow the instructions below unless told otherwise  by your healthcare provider:  · Always wash your hands before changing your dressing.  · After the first 48 hours, the incision wound usually will have closed. At this point, leave the incision uncovered and open to the air. If the incision has not closed keep it covered.  · Cover your incision only if your clothing is rubbing it or causing irritation.  · Change your dressing if it gets wet or soiled.  Follow-up care  Follow up with your healthcare provider to ask how long sutures or staples should be left in place. Be sure to return for stitch or staple removal as directed. If dissolving stitches were used in your mouth, these will not need to be removed. They should fall out or dissolve on their own.  If tape closures were used, remove them yourself when your provider recommends if they have not fallen off on their own. If skin glue was used, the glue will wear off by itself.  When to seek medical care  Call your healthcare provider if you have any of the following:  · More pain, redness, swelling, bleeding, or foul-smelling discharge around the incision area  · Fever of 100.4°F (38ºC) or higher, or as directed by your healthcare provider  · Shaking chills  · Vomiting or nausea that doesn't go away  · Numbness, coldness, or tingling around the incision area, or changes in skin color  · Opening of the sutures or wound  · Stitches or staples come apart or fall out or surgical tape falls off before 7 days or as directed by your healthcare provider   Date Last Reviewed: 12/1/2016  © 6442-9997 [employer]. [address] [place] [address], [place] [address]. All rights reserved. This information is not intended as a substitute for professional medical care. Always follow your healthcare professional's instructions.

## 2019-09-05 ENCOUNTER — HOSPITAL ENCOUNTER (INPATIENT)
Facility: HOSPITAL | Age: 64
LOS: 5 days | Discharge: HOME | DRG: 235 | End: 2019-09-10
Attending: THORACIC SURGERY (CARDIOTHORACIC VASCULAR SURGERY) | Admitting: THORACIC SURGERY (CARDIOTHORACIC VASCULAR SURGERY)
Payer: COMMERCIAL

## 2019-09-05 ENCOUNTER — ANESTHESIA (OUTPATIENT)
Dept: SURGERY | Facility: HOSPITAL | Age: 64
DRG: 235 | End: 2019-09-05
Payer: COMMERCIAL

## 2019-09-05 ENCOUNTER — ANESTHESIA EVENT (OUTPATIENT)
Dept: SURGERY | Facility: HOSPITAL | Age: 64
DRG: 235 | End: 2019-09-05
Payer: COMMERCIAL

## 2019-09-05 DIAGNOSIS — I25.10 ATHEROSCLEROSIS OF NATIVE CORONARY ARTERY OF NATIVE HEART: ICD-10-CM

## 2019-09-05 DIAGNOSIS — I25.118 ATHEROSCLEROSIS OF NATIVE CORONARY ARTERY OF NATIVE HEART WITH STABLE ANGINA PECTORIS: ICD-10-CM

## 2019-09-05 DIAGNOSIS — Z09 POSTOP CHECK: ICD-10-CM

## 2019-09-05 DIAGNOSIS — Z95.1 HX OF CABG: ICD-10-CM

## 2019-09-05 DIAGNOSIS — I25.10 CORONARY ARTERY DISEASE, ANGINA PRESENCE UNSPECIFIED, UNSPECIFIED VESSEL OR LESION TYPE, UNSPECIFIED WHETHER NATIVE OR TRANSPLANTED HEART: ICD-10-CM

## 2019-09-05 DIAGNOSIS — I25.10 CAD (CORONARY ARTERY DISEASE): ICD-10-CM

## 2019-09-05 LAB
ANION GAP SERPL CALC-SCNC: 6 MMOL/L (ref 8–16)
ANION GAP SERPL CALC-SCNC: 9 MMOL/L (ref 8–16)
BASOPHILS # BLD AUTO: 0.02 K/UL (ref 0–0.2)
BASOPHILS NFR BLD: 0.2 % (ref 0–1.9)
BUN SERPL-MCNC: 24 MG/DL (ref 8–23)
BUN SERPL-MCNC: 25 MG/DL (ref 8–23)
CALCIUM SERPL-MCNC: 8.1 MG/DL (ref 8.7–10.5)
CALCIUM SERPL-MCNC: 8.2 MG/DL (ref 8.7–10.5)
CHLORIDE SERPL-SCNC: 113 MMOL/L (ref 95–110)
CHLORIDE SERPL-SCNC: 115 MMOL/L (ref 95–110)
CO2 SERPL-SCNC: 19 MMOL/L (ref 23–29)
CO2 SERPL-SCNC: 22 MMOL/L (ref 23–29)
CREAT SERPL-MCNC: 0.8 MG/DL (ref 0.5–1.4)
CREAT SERPL-MCNC: 0.8 MG/DL (ref 0.5–1.4)
DIFFERENTIAL METHOD: ABNORMAL
EOSINOPHIL # BLD AUTO: 0.1 K/UL (ref 0–0.5)
EOSINOPHIL NFR BLD: 0.5 % (ref 0–8)
ERYTHROCYTE [DISTWIDTH] IN BLOOD BY AUTOMATED COUNT: 11.6 % (ref 11.5–14.5)
ERYTHROCYTE [DISTWIDTH] IN BLOOD BY AUTOMATED COUNT: 11.7 % (ref 11.5–14.5)
EST. GFR  (AFRICAN AMERICAN): >60 ML/MIN/1.73 M^2
EST. GFR  (AFRICAN AMERICAN): >60 ML/MIN/1.73 M^2
EST. GFR  (NON AFRICAN AMERICAN): >60 ML/MIN/1.73 M^2
EST. GFR  (NON AFRICAN AMERICAN): >60 ML/MIN/1.73 M^2
FIO2: 100
FIO2: 30
FIO2: 40
FIO2: 60
GLUCOSE SERPL-MCNC: 102 MG/DL (ref 70–110)
GLUCOSE SERPL-MCNC: 104 MG/DL (ref 70–110)
GLUCOSE SERPL-MCNC: 111 MG/DL (ref 70–110)
GLUCOSE SERPL-MCNC: 115 MG/DL (ref 70–110)
GLUCOSE SERPL-MCNC: 116 MG/DL (ref 70–110)
GLUCOSE SERPL-MCNC: 119 MG/DL (ref 70–110)
GLUCOSE SERPL-MCNC: 131 MG/DL (ref 70–110)
GLUCOSE SERPL-MCNC: 132 MG/DL (ref 70–110)
GLUCOSE SERPL-MCNC: 139 MG/DL (ref 70–110)
GLUCOSE SERPL-MCNC: 155 MG/DL (ref 70–110)
GLUCOSE SERPL-MCNC: 159 MG/DL (ref 70–110)
GLUCOSE SERPL-MCNC: 161 MG/DL (ref 70–110)
GLUCOSE SERPL-MCNC: 162 MG/DL (ref 70–110)
GLUCOSE SERPL-MCNC: 176 MG/DL (ref 70–110)
GLUCOSE SERPL-MCNC: 185 MG/DL (ref 70–110)
GLUCOSE SERPL-MCNC: 85 MG/DL (ref 70–110)
HCO3 UR-SCNC: 18.1 MMOL/L (ref 24–28)
HCO3 UR-SCNC: 19.1 MMOL/L (ref 24–28)
HCO3 UR-SCNC: 19.1 MMOL/L (ref 24–28)
HCO3 UR-SCNC: 21.6 MMOL/L (ref 24–28)
HCO3 UR-SCNC: 24 MMOL/L (ref 24–28)
HCO3 UR-SCNC: 24.1 MMOL/L (ref 24–28)
HCO3 UR-SCNC: 24.4 MMOL/L (ref 24–28)
HCO3 UR-SCNC: 26.5 MMOL/L (ref 24–28)
HCO3 UR-SCNC: 27.6 MMOL/L (ref 24–28)
HCT VFR BLD AUTO: 28.8 % (ref 37–48.5)
HCT VFR BLD AUTO: 33.3 % (ref 37–48.5)
HCT VFR BLD CALC: 23 %PCV (ref 36–54)
HCT VFR BLD CALC: 25 %PCV (ref 36–54)
HCT VFR BLD CALC: 25 %PCV (ref 36–54)
HCT VFR BLD CALC: 26 %PCV (ref 36–54)
HCT VFR BLD CALC: 26 %PCV (ref 36–54)
HCT VFR BLD CALC: 28 %PCV (ref 36–54)
HCT VFR BLD CALC: 29 %PCV (ref 36–54)
HCT VFR BLD CALC: 31 %PCV (ref 36–54)
HCT VFR BLD CALC: 32 %PCV (ref 36–54)
HGB BLD-MCNC: 11.2 G/DL (ref 12–16)
HGB BLD-MCNC: 9.8 G/DL (ref 12–16)
IMM GRANULOCYTES # BLD AUTO: 0.07 K/UL (ref 0–0.04)
IMM GRANULOCYTES NFR BLD AUTO: 0.6 % (ref 0–0.5)
LYMPHOCYTES # BLD AUTO: 1.8 K/UL (ref 1–4.8)
LYMPHOCYTES NFR BLD: 15.4 % (ref 18–48)
MAGNESIUM SERPL-MCNC: 1.9 MG/DL (ref 1.6–2.6)
MAGNESIUM SERPL-MCNC: 2 MG/DL (ref 1.6–2.6)
MCH RBC QN AUTO: 32.4 PG (ref 27–31)
MCH RBC QN AUTO: 32.7 PG (ref 27–31)
MCHC RBC AUTO-ENTMCNC: 33.6 G/DL (ref 32–36)
MCHC RBC AUTO-ENTMCNC: 34 G/DL (ref 32–36)
MCV RBC AUTO: 96 FL (ref 82–98)
MCV RBC AUTO: 96 FL (ref 82–98)
MONOCYTES # BLD AUTO: 0.7 K/UL (ref 0.3–1)
MONOCYTES NFR BLD: 5.8 % (ref 4–15)
NEUTROPHILS # BLD AUTO: 8.9 K/UL (ref 1.8–7.7)
NEUTROPHILS NFR BLD: 77.5 % (ref 38–73)
NRBC BLD-RTO: 0 /100 WBC
PCO2 BLDA: 33.8 MMHG (ref 35–45)
PCO2 BLDA: 33.8 MMHG (ref 35–45)
PCO2 BLDA: 36.7 MMHG (ref 35–45)
PCO2 BLDA: 36.8 MMHG (ref 35–45)
PCO2 BLDA: 36.8 MMHG (ref 35–45)
PCO2 BLDA: 38.1 MMHG (ref 35–45)
PCO2 BLDA: 38.6 MMHG (ref 35–45)
PCO2 BLDA: 38.8 MMHG (ref 35–45)
PCO2 BLDA: 42.9 MMHG (ref 35–45)
PH SMN: 7.31 [PH] (ref 7.35–7.45)
PH SMN: 7.34 [PH] (ref 7.35–7.45)
PH SMN: 7.36 [PH] (ref 7.35–7.45)
PH SMN: 7.38 [PH] (ref 7.35–7.45)
PH SMN: 7.4 [PH] (ref 7.35–7.45)
PH SMN: 7.41 [PH] (ref 7.35–7.45)
PH SMN: 7.42 [PH] (ref 7.35–7.45)
PH SMN: 7.42 [PH] (ref 7.35–7.45)
PH SMN: 7.46 [PH] (ref 7.35–7.45)
PHOSPHATE SERPL-MCNC: 1.2 MG/DL (ref 2.7–4.5)
PHOSPHATE SERPL-MCNC: 1.3 MG/DL (ref 2.7–4.5)
PLATELET # BLD AUTO: 109 K/UL (ref 150–350)
PLATELET # BLD AUTO: 130 K/UL (ref 150–350)
PMV BLD AUTO: 10.7 FL (ref 9.2–12.9)
PMV BLD AUTO: 10.9 FL (ref 9.2–12.9)
PO2 BLDA: 136 MMHG (ref 80–100)
PO2 BLDA: 145 MMHG (ref 80–100)
PO2 BLDA: 205 MMHG (ref 80–100)
PO2 BLDA: 281 MMHG (ref 80–100)
PO2 BLDA: 330 MMHG (ref 80–100)
PO2 BLDA: 424 MMHG (ref 80–100)
PO2 BLDA: 435 MMHG (ref 80–100)
PO2 BLDA: 462 MMHG (ref 80–100)
PO2 BLDA: 98 MMHG (ref 80–100)
POC ACTIVATED CLOTTING TIME K: 125 SEC (ref 74–137)
POC ACTIVATED CLOTTING TIME K: 142 SEC (ref 74–137)
POC ACTIVATED CLOTTING TIME K: 763 SEC (ref 74–137)
POC ACTIVATED CLOTTING TIME K: 764 SEC (ref 74–137)
POC ACTIVATED CLOTTING TIME K: 995 SEC (ref 74–137)
POC BE: -1 MMOL/L
POC BE: -4 MMOL/L
POC BE: -6 MMOL/L
POC BE: -7 MMOL/L
POC BE: -8 MMOL/L
POC BE: 0 MMOL/L
POC BE: 0 MMOL/L
POC BE: 3 MMOL/L
POC BE: 3 MMOL/L
POC IONIZED CALCIUM: 1.12 MMOL/L (ref 1.06–1.42)
POC IONIZED CALCIUM: 1.12 MMOL/L (ref 1.06–1.42)
POC IONIZED CALCIUM: 1.18 MMOL/L (ref 1.06–1.42)
POC IONIZED CALCIUM: 1.21 MMOL/L (ref 1.06–1.42)
POC IONIZED CALCIUM: 1.21 MMOL/L (ref 1.06–1.42)
POC IONIZED CALCIUM: 1.23 MMOL/L (ref 1.06–1.42)
POC IONIZED CALCIUM: 1.25 MMOL/L (ref 1.06–1.42)
POC IONIZED CALCIUM: 1.27 MMOL/L (ref 1.06–1.42)
POC IONIZED CALCIUM: 1.29 MMOL/L (ref 1.06–1.42)
POC SATURATED O2: 100 % (ref 95–100)
POC SATURATED O2: 97 % (ref 95–100)
POC SATURATED O2: 99 % (ref 95–100)
POC SATURATED O2: 99 % (ref 95–100)
POC TCO2: 19 MMOL/L (ref 23–27)
POC TCO2: 20 MMOL/L (ref 23–27)
POC TCO2: 20 MMOL/L (ref 23–27)
POC TCO2: 23 MMOL/L (ref 23–27)
POC TCO2: 25 MMOL/L (ref 23–27)
POC TCO2: 25 MMOL/L (ref 23–27)
POC TCO2: 26 MMOL/L (ref 23–27)
POC TCO2: 28 MMOL/L (ref 23–27)
POC TCO2: 29 MMOL/L (ref 23–27)
POTASSIUM BLD-SCNC: 3.2 MMOL/L (ref 3.5–5.1)
POTASSIUM BLD-SCNC: 3.2 MMOL/L (ref 3.5–5.1)
POTASSIUM BLD-SCNC: 3.3 MMOL/L (ref 3.5–5.1)
POTASSIUM BLD-SCNC: 3.4 MMOL/L (ref 3.5–5.1)
POTASSIUM BLD-SCNC: 3.4 MMOL/L (ref 3.5–5.1)
POTASSIUM BLD-SCNC: 3.6 MMOL/L (ref 3.5–5.1)
POTASSIUM BLD-SCNC: 3.7 MMOL/L (ref 3.5–5.1)
POTASSIUM BLD-SCNC: 3.8 MMOL/L (ref 3.5–5.1)
POTASSIUM BLD-SCNC: 4.8 MMOL/L (ref 3.5–5.1)
POTASSIUM SERPL-SCNC: 3.1 MMOL/L (ref 3.5–5.1)
POTASSIUM SERPL-SCNC: 3.8 MMOL/L (ref 3.5–5.1)
POTASSIUM SERPL-SCNC: 3.8 MMOL/L (ref 3.5–5.1)
RBC # BLD AUTO: 3 M/UL (ref 4–5.4)
RBC # BLD AUTO: 3.46 M/UL (ref 4–5.4)
SAMPLE: ABNORMAL
SAMPLE: NORMAL
SODIUM BLD-SCNC: 140 MMOL/L (ref 136–145)
SODIUM BLD-SCNC: 140 MMOL/L (ref 136–145)
SODIUM BLD-SCNC: 142 MMOL/L (ref 136–145)
SODIUM BLD-SCNC: 143 MMOL/L (ref 136–145)
SODIUM BLD-SCNC: 144 MMOL/L (ref 136–145)
SODIUM BLD-SCNC: 145 MMOL/L (ref 136–145)
SODIUM BLD-SCNC: 145 MMOL/L (ref 136–145)
SODIUM BLD-SCNC: 146 MMOL/L (ref 136–145)
SODIUM BLD-SCNC: 146 MMOL/L (ref 136–145)
SODIUM SERPL-SCNC: 141 MMOL/L (ref 136–145)
SODIUM SERPL-SCNC: 143 MMOL/L (ref 136–145)
WBC # BLD AUTO: 11.42 K/UL (ref 3.9–12.7)
WBC # BLD AUTO: 15.6 K/UL (ref 3.9–12.7)

## 2019-09-05 PROCEDURE — 94761 N-INVAS EAR/PLS OXIMETRY MLT: CPT

## 2019-09-05 PROCEDURE — 82803 BLOOD GASES ANY COMBINATION: CPT

## 2019-09-05 PROCEDURE — C9113 INJ PANTOPRAZOLE SODIUM, VIA: HCPCS | Performed by: THORACIC SURGERY (CARDIOTHORACIC VASCULAR SURGERY)

## 2019-09-05 PROCEDURE — 27000080 OPTIME MED/SURG SUP & DEVICES GENERAL CLASSIFICATION: Performed by: THORACIC SURGERY (CARDIOTHORACIC VASCULAR SURGERY)

## 2019-09-05 PROCEDURE — 37000008 HC ANESTHESIA 1ST 15 MINUTES: Performed by: THORACIC SURGERY (CARDIOTHORACIC VASCULAR SURGERY)

## 2019-09-05 PROCEDURE — 99900035 HC TECH TIME PER 15 MIN (STAT)

## 2019-09-05 PROCEDURE — 37799 UNLISTED PX VASCULAR SURGERY: CPT

## 2019-09-05 PROCEDURE — 83735 ASSAY OF MAGNESIUM: CPT | Mod: 91

## 2019-09-05 PROCEDURE — 36000713 HC OR TIME LEV V EA ADD 15 MIN: Performed by: THORACIC SURGERY (CARDIOTHORACIC VASCULAR SURGERY)

## 2019-09-05 PROCEDURE — P9045 ALBUMIN (HUMAN), 5%, 250 ML: HCPCS | Mod: JG

## 2019-09-05 PROCEDURE — 85027 COMPLETE CBC AUTOMATED: CPT

## 2019-09-05 PROCEDURE — 33533 CABG ARTERIAL SINGLE: CPT | Mod: ,,, | Performed by: THORACIC SURGERY (CARDIOTHORACIC VASCULAR SURGERY)

## 2019-09-05 PROCEDURE — 84100 ASSAY OF PHOSPHORUS: CPT

## 2019-09-05 PROCEDURE — 93005 ELECTROCARDIOGRAM TRACING: CPT

## 2019-09-05 PROCEDURE — 63600175 PHARM REV CODE 636 W HCPCS: Performed by: THORACIC SURGERY (CARDIOTHORACIC VASCULAR SURGERY)

## 2019-09-05 PROCEDURE — 33508 PR ENDOSCOPY W/VIDEO-ASST VEIN HARVEST,CABG: ICD-10-PCS | Mod: ,,, | Performed by: THORACIC SURGERY (CARDIOTHORACIC VASCULAR SURGERY)

## 2019-09-05 PROCEDURE — C9248 INJ, CLEVIDIPINE BUTYRATE: HCPCS

## 2019-09-05 PROCEDURE — 25000003 PHARM REV CODE 250: Performed by: THORACIC SURGERY (CARDIOTHORACIC VASCULAR SURGERY)

## 2019-09-05 PROCEDURE — P9045 ALBUMIN (HUMAN), 5%, 250 ML: HCPCS | Mod: JG | Performed by: THORACIC SURGERY (CARDIOTHORACIC VASCULAR SURGERY)

## 2019-09-05 PROCEDURE — 21000000 HC CCU ICU ROOM CHARGE

## 2019-09-05 PROCEDURE — 36000712 HC OR TIME LEV V 1ST 15 MIN: Performed by: THORACIC SURGERY (CARDIOTHORACIC VASCULAR SURGERY)

## 2019-09-05 PROCEDURE — 37000009 HC ANESTHESIA EA ADD 15 MINS: Performed by: THORACIC SURGERY (CARDIOTHORACIC VASCULAR SURGERY)

## 2019-09-05 PROCEDURE — 33533 PR CABG, ARTERIAL, SINGLE: ICD-10-PCS | Mod: ,,, | Performed by: THORACIC SURGERY (CARDIOTHORACIC VASCULAR SURGERY)

## 2019-09-05 PROCEDURE — 33518 PR CABG, ARTERY-VEIN, TWO: ICD-10-PCS | Mod: ,,, | Performed by: THORACIC SURGERY (CARDIOTHORACIC VASCULAR SURGERY)

## 2019-09-05 PROCEDURE — 33518 CABG ARTERY-VEIN TWO: CPT | Mod: ,,, | Performed by: THORACIC SURGERY (CARDIOTHORACIC VASCULAR SURGERY)

## 2019-09-05 PROCEDURE — 27201423 OPTIME MED/SURG SUP & DEVICES STERILE SUPPLY: Performed by: THORACIC SURGERY (CARDIOTHORACIC VASCULAR SURGERY)

## 2019-09-05 PROCEDURE — 85025 COMPLETE CBC W/AUTO DIFF WBC: CPT

## 2019-09-05 PROCEDURE — 82330 ASSAY OF CALCIUM: CPT

## 2019-09-05 PROCEDURE — 33508 ENDOSCOPIC VEIN HARVEST: CPT | Mod: ,,, | Performed by: THORACIC SURGERY (CARDIOTHORACIC VASCULAR SURGERY)

## 2019-09-05 PROCEDURE — S0017 INJECTION, AMINOCAPROIC ACID: HCPCS | Performed by: NURSE ANESTHETIST, CERTIFIED REGISTERED

## 2019-09-05 PROCEDURE — 80048 BASIC METABOLIC PNL TOTAL CA: CPT

## 2019-09-05 PROCEDURE — 63600175 PHARM REV CODE 636 W HCPCS: Performed by: NURSE ANESTHETIST, CERTIFIED REGISTERED

## 2019-09-05 PROCEDURE — 20000000 HC ICU ROOM

## 2019-09-05 PROCEDURE — 36415 COLL VENOUS BLD VENIPUNCTURE: CPT

## 2019-09-05 PROCEDURE — 25000003 PHARM REV CODE 250: Performed by: ANESTHESIOLOGY

## 2019-09-05 PROCEDURE — 25000003 PHARM REV CODE 250: Performed by: NURSE ANESTHETIST, CERTIFIED REGISTERED

## 2019-09-05 PROCEDURE — C1729 CATH, DRAINAGE: HCPCS | Performed by: THORACIC SURGERY (CARDIOTHORACIC VASCULAR SURGERY)

## 2019-09-05 PROCEDURE — 85014 HEMATOCRIT: CPT

## 2019-09-05 PROCEDURE — 84295 ASSAY OF SERUM SODIUM: CPT

## 2019-09-05 PROCEDURE — 25000003 PHARM REV CODE 250

## 2019-09-05 PROCEDURE — 94002 VENT MGMT INPAT INIT DAY: CPT

## 2019-09-05 PROCEDURE — 84132 ASSAY OF SERUM POTASSIUM: CPT

## 2019-09-05 PROCEDURE — 94770 HC EXHALED C02 TEST: CPT

## 2019-09-05 PROCEDURE — 27000221 HC OXYGEN, UP TO 24 HOURS

## 2019-09-05 PROCEDURE — 63600175 PHARM REV CODE 636 W HCPCS

## 2019-09-05 DEVICE — PLEDGET FELT BARD 4.8MM X 9.5MM 007963: Type: IMPLANTABLE DEVICE | Site: HEART | Status: FUNCTIONAL

## 2019-09-05 RX ORDER — FENTANYL CITRATE 50 UG/ML
INJECTION, SOLUTION INTRAMUSCULAR; INTRAVENOUS
Status: DISCONTINUED | OUTPATIENT
Start: 2019-09-05 | End: 2019-09-05

## 2019-09-05 RX ORDER — MAGNESIUM SULFATE HEPTAHYDRATE 40 MG/ML
4 INJECTION, SOLUTION INTRAVENOUS
Status: DISCONTINUED | OUTPATIENT
Start: 2019-09-05 | End: 2019-09-10 | Stop reason: HOSPADM

## 2019-09-05 RX ORDER — ROCURONIUM BROMIDE 10 MG/ML
INJECTION, SOLUTION INTRAVENOUS
Status: DISCONTINUED | OUTPATIENT
Start: 2019-09-05 | End: 2019-09-05

## 2019-09-05 RX ORDER — MIDAZOLAM HYDROCHLORIDE 1 MG/ML
INJECTION INTRAMUSCULAR; INTRAVENOUS
Status: DISCONTINUED | OUTPATIENT
Start: 2019-09-05 | End: 2019-09-05

## 2019-09-05 RX ORDER — POTASSIUM CHLORIDE 14.9 MG/ML
INJECTION INTRAVENOUS
Status: DISPENSED
Start: 2019-09-05 | End: 2019-09-06

## 2019-09-05 RX ORDER — MORPHINE SULFATE 4 MG/ML
4 INJECTION, SOLUTION INTRAMUSCULAR; INTRAVENOUS
Status: DISCONTINUED | OUTPATIENT
Start: 2019-09-05 | End: 2019-09-10 | Stop reason: HOSPADM

## 2019-09-05 RX ORDER — PHENYLEPHRINE HYDROCHLORIDE 10 MG/ML
INJECTION INTRAVENOUS
Status: DISCONTINUED | OUTPATIENT
Start: 2019-09-05 | End: 2019-09-05

## 2019-09-05 RX ORDER — SODIUM CHLORIDE, SODIUM LACTATE, POTASSIUM CHLORIDE, CALCIUM CHLORIDE 600; 310; 30; 20 MG/100ML; MG/100ML; MG/100ML; MG/100ML
INJECTION, SOLUTION INTRAVENOUS CONTINUOUS PRN
Status: DISCONTINUED | OUTPATIENT
Start: 2019-09-05 | End: 2019-09-05

## 2019-09-05 RX ORDER — ALBUMIN HUMAN 50 G/1000ML
500 SOLUTION INTRAVENOUS ONCE
Status: COMPLETED | OUTPATIENT
Start: 2019-09-05 | End: 2019-09-05

## 2019-09-05 RX ORDER — MORPHINE SULFATE 2 MG/ML
2 INJECTION, SOLUTION INTRAMUSCULAR; INTRAVENOUS
Status: DISCONTINUED | OUTPATIENT
Start: 2019-09-05 | End: 2019-09-10 | Stop reason: HOSPADM

## 2019-09-05 RX ORDER — HEPARIN SODIUM 5000 [USP'U]/ML
INJECTION, SOLUTION INTRAVENOUS; SUBCUTANEOUS
Status: DISCONTINUED | OUTPATIENT
Start: 2019-09-05 | End: 2019-09-05

## 2019-09-05 RX ORDER — PROTAMINE SULFATE 10 MG/ML
INJECTION, SOLUTION INTRAVENOUS
Status: DISCONTINUED | OUTPATIENT
Start: 2019-09-05 | End: 2019-09-05

## 2019-09-05 RX ORDER — MIDAZOLAM HYDROCHLORIDE 1 MG/ML
2 INJECTION INTRAMUSCULAR; INTRAVENOUS
Status: DISPENSED | OUTPATIENT
Start: 2019-09-05 | End: 2019-09-06

## 2019-09-05 RX ORDER — ONDANSETRON 2 MG/ML
4 INJECTION INTRAMUSCULAR; INTRAVENOUS EVERY 6 HOURS PRN
Status: DISCONTINUED | OUTPATIENT
Start: 2019-09-05 | End: 2019-09-10 | Stop reason: HOSPADM

## 2019-09-05 RX ORDER — HEPARIN SODIUM 1000 [USP'U]/ML
INJECTION, SOLUTION INTRAVENOUS; SUBCUTANEOUS
Status: DISCONTINUED | OUTPATIENT
Start: 2019-09-05 | End: 2019-09-05

## 2019-09-05 RX ORDER — INDOMETHACIN 25 MG/1
CAPSULE ORAL
Status: DISPENSED
Start: 2019-09-05 | End: 2019-09-06

## 2019-09-05 RX ORDER — POTASSIUM CHLORIDE 14.9 MG/ML
INJECTION INTRAVENOUS
Status: COMPLETED
Start: 2019-09-05 | End: 2019-09-05

## 2019-09-05 RX ORDER — PANTOPRAZOLE SODIUM 40 MG/10ML
40 INJECTION, POWDER, LYOPHILIZED, FOR SOLUTION INTRAVENOUS DAILY
Status: DISCONTINUED | OUTPATIENT
Start: 2019-09-05 | End: 2019-09-10 | Stop reason: HOSPADM

## 2019-09-05 RX ORDER — LIDOCAINE HYDROCHLORIDE 20 MG/ML
INJECTION, SOLUTION EPIDURAL; INFILTRATION; INTRACAUDAL; PERINEURAL
Status: DISCONTINUED | OUTPATIENT
Start: 2019-09-05 | End: 2019-09-05

## 2019-09-05 RX ORDER — POTASSIUM CHLORIDE 14.9 MG/ML
60 INJECTION INTRAVENOUS
Status: DISCONTINUED | OUTPATIENT
Start: 2019-09-05 | End: 2019-09-10 | Stop reason: HOSPADM

## 2019-09-05 RX ORDER — CEFAZOLIN SODIUM 1 G/3ML
INJECTION, POWDER, FOR SOLUTION INTRAMUSCULAR; INTRAVENOUS
Status: DISCONTINUED | OUTPATIENT
Start: 2019-09-05 | End: 2019-09-05

## 2019-09-05 RX ORDER — BACITRACIN 50000 [IU]/1
INJECTION, POWDER, FOR SOLUTION INTRAMUSCULAR
Status: DISCONTINUED | OUTPATIENT
Start: 2019-09-05 | End: 2019-09-05

## 2019-09-05 RX ORDER — ASPIRIN 81 MG/1
81 TABLET ORAL DAILY
Status: DISCONTINUED | OUTPATIENT
Start: 2019-09-06 | End: 2019-09-10 | Stop reason: HOSPADM

## 2019-09-05 RX ORDER — SODIUM CHLORIDE 9 MG/ML
INJECTION, SOLUTION INTRAVENOUS
Status: DISCONTINUED | OUTPATIENT
Start: 2019-09-05 | End: 2019-09-05

## 2019-09-05 RX ORDER — ETOMIDATE 2 MG/ML
INJECTION INTRAVENOUS
Status: DISCONTINUED | OUTPATIENT
Start: 2019-09-05 | End: 2019-09-05

## 2019-09-05 RX ORDER — SUCCINYLCHOLINE CHLORIDE 20 MG/ML
INJECTION INTRAMUSCULAR; INTRAVENOUS
Status: DISCONTINUED | OUTPATIENT
Start: 2019-09-05 | End: 2019-09-05

## 2019-09-05 RX ORDER — VECURONIUM BROMIDE FOR INJECTION 1 MG/ML
INJECTION, POWDER, LYOPHILIZED, FOR SOLUTION INTRAVENOUS
Status: DISCONTINUED | OUTPATIENT
Start: 2019-09-05 | End: 2019-09-05

## 2019-09-05 RX ORDER — HYDROCODONE BITARTRATE AND ACETAMINOPHEN 5; 325 MG/1; MG/1
1 TABLET ORAL EVERY 4 HOURS PRN
Status: DISCONTINUED | OUTPATIENT
Start: 2019-09-05 | End: 2019-09-10 | Stop reason: HOSPADM

## 2019-09-05 RX ORDER — ALBUMIN HUMAN 50 G/1000ML
25 SOLUTION INTRAVENOUS ONCE
Status: COMPLETED | OUTPATIENT
Start: 2019-09-05 | End: 2019-09-05

## 2019-09-05 RX ORDER — ATORVASTATIN CALCIUM 10 MG/1
10 TABLET, FILM COATED ORAL DAILY
Status: DISCONTINUED | OUTPATIENT
Start: 2019-09-05 | End: 2019-09-10 | Stop reason: HOSPADM

## 2019-09-05 RX ORDER — MAGNESIUM SULFATE 1 G/100ML
1 INJECTION INTRAVENOUS ONCE
Status: COMPLETED | OUTPATIENT
Start: 2019-09-05 | End: 2019-09-05

## 2019-09-05 RX ORDER — POTASSIUM CHLORIDE 29.8 MG/ML
40 INJECTION INTRAVENOUS
Status: DISCONTINUED | OUTPATIENT
Start: 2019-09-05 | End: 2019-09-10 | Stop reason: HOSPADM

## 2019-09-05 RX ORDER — MAGNESIUM SULFATE HEPTAHYDRATE 40 MG/ML
2 INJECTION, SOLUTION INTRAVENOUS
Status: DISCONTINUED | OUTPATIENT
Start: 2019-09-05 | End: 2019-09-10 | Stop reason: HOSPADM

## 2019-09-05 RX ORDER — SODIUM CHLORIDE 450 MG/100ML
INJECTION, SOLUTION INTRAVENOUS CONTINUOUS
Status: DISCONTINUED | OUTPATIENT
Start: 2019-09-05 | End: 2019-09-10 | Stop reason: HOSPADM

## 2019-09-05 RX ORDER — CEFAZOLIN SODIUM 2 G/50ML
2 SOLUTION INTRAVENOUS
Status: COMPLETED | OUTPATIENT
Start: 2019-09-05 | End: 2019-09-06

## 2019-09-05 RX ORDER — MUPIROCIN 20 MG/G
1 OINTMENT TOPICAL 2 TIMES DAILY
Status: COMPLETED | OUTPATIENT
Start: 2019-09-05 | End: 2019-09-10

## 2019-09-05 RX ORDER — LIDOCAINE HYDROCHLORIDE 10 MG/ML
INJECTION, SOLUTION EPIDURAL; INFILTRATION; INTRACAUDAL; PERINEURAL
Status: COMPLETED | OUTPATIENT
Start: 2019-09-05 | End: 2019-09-05

## 2019-09-05 RX ORDER — MAGNESIUM SULFATE 1 G/100ML
INJECTION INTRAVENOUS
Status: COMPLETED
Start: 2019-09-05 | End: 2019-09-05

## 2019-09-05 RX ORDER — MIDAZOLAM HYDROCHLORIDE 1 MG/ML
1 INJECTION INTRAMUSCULAR; INTRAVENOUS
Status: ACTIVE | OUTPATIENT
Start: 2019-09-05 | End: 2019-09-06

## 2019-09-05 RX ORDER — DOCUSATE SODIUM 100 MG/1
100 CAPSULE, LIQUID FILLED ORAL 2 TIMES DAILY
Status: DISCONTINUED | OUTPATIENT
Start: 2019-09-06 | End: 2019-09-10 | Stop reason: HOSPADM

## 2019-09-05 RX ORDER — ALBUMIN HUMAN 50 G/1000ML
SOLUTION INTRAVENOUS
Status: COMPLETED
Start: 2019-09-05 | End: 2019-09-05

## 2019-09-05 RX ADMIN — SODIUM CHLORIDE, SODIUM LACTATE, POTASSIUM CHLORIDE, AND CALCIUM CHLORIDE: .6; .31; .03; .02 INJECTION, SOLUTION INTRAVENOUS at 01:09

## 2019-09-05 RX ADMIN — SODIUM CHLORIDE: 0.9 INJECTION, SOLUTION INTRAVENOUS at 03:09

## 2019-09-05 RX ADMIN — FENTANYL CITRATE 250 MCG: 50 INJECTION INTRAMUSCULAR; INTRAVENOUS at 11:09

## 2019-09-05 RX ADMIN — PHENYLEPHRINE HYDROCHLORIDE 80 MCG: 10 INJECTION INTRAVENOUS at 12:09

## 2019-09-05 RX ADMIN — ALBUMIN (HUMAN) 500 ML: 12.5 SOLUTION INTRAVENOUS at 04:09

## 2019-09-05 RX ADMIN — POTASSIUM CHLORIDE 20 MEQ: 14.9 INJECTION, SOLUTION INTRAVENOUS at 04:09

## 2019-09-05 RX ADMIN — ALBUMIN (HUMAN) 25 G: 12.5 SOLUTION INTRAVENOUS at 09:09

## 2019-09-05 RX ADMIN — VECURONIUM BROMIDE 5 MG: 1 INJECTION, POWDER, LYOPHILIZED, FOR SOLUTION INTRAVENOUS at 03:09

## 2019-09-05 RX ADMIN — MUPIROCIN 1 G: 20 OINTMENT TOPICAL at 09:09

## 2019-09-05 RX ADMIN — VECURONIUM BROMIDE 5 MG: 1 INJECTION, POWDER, LYOPHILIZED, FOR SOLUTION INTRAVENOUS at 11:09

## 2019-09-05 RX ADMIN — SUCCINYLCHOLINE CHLORIDE 140 MG: 20 INJECTION, SOLUTION INTRAMUSCULAR; INTRAVENOUS at 10:09

## 2019-09-05 RX ADMIN — MIDAZOLAM HYDROCHLORIDE 1 MG: 1 INJECTION, SOLUTION INTRAMUSCULAR; INTRAVENOUS at 10:09

## 2019-09-05 RX ADMIN — CEFAZOLIN SODIUM 2 G: 2 SOLUTION INTRAVENOUS at 06:09

## 2019-09-05 RX ADMIN — FENTANYL CITRATE 150 MCG: 50 INJECTION INTRAMUSCULAR; INTRAVENOUS at 11:09

## 2019-09-05 RX ADMIN — PHENYLEPHRINE HYDROCHLORIDE 80 MCG: 10 INJECTION INTRAVENOUS at 11:09

## 2019-09-05 RX ADMIN — LIDOCAINE HYDROCHLORIDE 60 MG: 20 INJECTION, SOLUTION EPIDURAL; INFILTRATION; INTRACAUDAL; PERINEURAL at 10:09

## 2019-09-05 RX ADMIN — VECURONIUM BROMIDE 5 MG: 1 INJECTION, POWDER, LYOPHILIZED, FOR SOLUTION INTRAVENOUS at 12:09

## 2019-09-05 RX ADMIN — MIDAZOLAM HYDROCHLORIDE 3 MG: 1 INJECTION, SOLUTION INTRAMUSCULAR; INTRAVENOUS at 11:09

## 2019-09-05 RX ADMIN — PROTAMINE SULFATE 250 MG: 10 INJECTION, SOLUTION INTRAVENOUS at 03:09

## 2019-09-05 RX ADMIN — FENTANYL CITRATE 100 MCG: 50 INJECTION INTRAMUSCULAR; INTRAVENOUS at 10:09

## 2019-09-05 RX ADMIN — HEPARIN SODIUM 25000 UNITS: 5000 INJECTION INTRAVENOUS; SUBCUTANEOUS at 01:09

## 2019-09-05 RX ADMIN — CEFAZOLIN 2 G: 330 INJECTION, POWDER, FOR SOLUTION INTRAMUSCULAR; INTRAVENOUS at 12:09

## 2019-09-05 RX ADMIN — FENTANYL CITRATE 150 MCG: 50 INJECTION INTRAMUSCULAR; INTRAVENOUS at 12:09

## 2019-09-05 RX ADMIN — FENTANYL CITRATE 250 MCG: 50 INJECTION INTRAMUSCULAR; INTRAVENOUS at 12:09

## 2019-09-05 RX ADMIN — MIDAZOLAM HYDROCHLORIDE 2 MG: 1 INJECTION, SOLUTION INTRAMUSCULAR; INTRAVENOUS at 10:09

## 2019-09-05 RX ADMIN — ETOMIDATE 14 MG: 2 INJECTION, SOLUTION INTRAVENOUS at 10:09

## 2019-09-05 RX ADMIN — MIDAZOLAM HYDROCHLORIDE 1 MG: 1 INJECTION, SOLUTION INTRAMUSCULAR; INTRAVENOUS at 03:09

## 2019-09-05 RX ADMIN — PANTOPRAZOLE SODIUM 40 MG: 40 INJECTION, POWDER, LYOPHILIZED, FOR SOLUTION INTRAVENOUS at 06:09

## 2019-09-05 RX ADMIN — FENTANYL CITRATE 100 MCG: 50 INJECTION INTRAMUSCULAR; INTRAVENOUS at 12:09

## 2019-09-05 RX ADMIN — FENTANYL CITRATE 250 MCG: 50 INJECTION INTRAMUSCULAR; INTRAVENOUS at 03:09

## 2019-09-05 RX ADMIN — LIDOCAINE HYDROCHLORIDE 20 MG: 10 INJECTION, SOLUTION EPIDURAL; INFILTRATION; INTRACAUDAL; PERINEURAL at 10:09

## 2019-09-05 RX ADMIN — SODIUM CHLORIDE: 0.45 INJECTION, SOLUTION INTRAVENOUS at 05:09

## 2019-09-05 RX ADMIN — POTASSIUM CHLORIDE 20 MEQ: 14.9 INJECTION, SOLUTION INTRAVENOUS at 10:09

## 2019-09-05 RX ADMIN — POTASSIUM CHLORIDE 40 MEQ: 29.8 INJECTION, SOLUTION INTRAVENOUS at 10:09

## 2019-09-05 RX ADMIN — MIDAZOLAM HYDROCHLORIDE 2 MG: 1 INJECTION, SOLUTION INTRAMUSCULAR; INTRAVENOUS at 02:09

## 2019-09-05 RX ADMIN — ALBUMIN HUMAN 500 ML: 50 SOLUTION INTRAVENOUS at 04:09

## 2019-09-05 RX ADMIN — AMINOCAPROIC ACID 5 G: 250 INJECTION, SOLUTION INTRAVENOUS at 03:09

## 2019-09-05 RX ADMIN — SODIUM PHOSPHATE, MONOBASIC, MONOHYDRATE AND SODIUM PHOSPHATE, DIBASIC, ANHYDROUS 30 MMOL: 276; 142 INJECTION, SOLUTION INTRAVENOUS at 08:09

## 2019-09-05 RX ADMIN — FENTANYL CITRATE 150 MCG: 50 INJECTION INTRAMUSCULAR; INTRAVENOUS at 03:09

## 2019-09-05 RX ADMIN — AMINOCAPROIC ACID 5 G: 250 INJECTION, SOLUTION INTRAVENOUS at 12:09

## 2019-09-05 RX ADMIN — SODIUM CHLORIDE, SODIUM LACTATE, POTASSIUM CHLORIDE, AND CALCIUM CHLORIDE: .6; .31; .03; .02 INJECTION, SOLUTION INTRAVENOUS at 10:09

## 2019-09-05 RX ADMIN — SODIUM CHLORIDE 0.9 UNITS/HR: 9 INJECTION, SOLUTION INTRAVENOUS at 02:09

## 2019-09-05 RX ADMIN — MAGNESIUM SULFATE 1 G: 1 INJECTION INTRAVENOUS at 06:09

## 2019-09-05 RX ADMIN — ROCURONIUM BROMIDE 5 MG: 10 INJECTION, SOLUTION INTRAVENOUS at 10:09

## 2019-09-05 RX ADMIN — SODIUM CHLORIDE 1.8 UNITS/HR: 9 INJECTION, SOLUTION INTRAVENOUS at 04:09

## 2019-09-05 RX ADMIN — SODIUM CHLORIDE: 0.9 INJECTION, SOLUTION INTRAVENOUS at 11:09

## 2019-09-05 RX ADMIN — MAGNESIUM SULFATE HEPTAHYDRATE 1 G: 1 INJECTION, SOLUTION INTRAVENOUS at 06:09

## 2019-09-05 RX ADMIN — MIDAZOLAM HYDROCHLORIDE 2 MG: 1 INJECTION, SOLUTION INTRAMUSCULAR; INTRAVENOUS at 05:09

## 2019-09-05 RX ADMIN — CLEVIPIDINE 25 MG: 0.5 EMULSION INTRAVENOUS at 04:09

## 2019-09-05 RX ADMIN — ROCURONIUM BROMIDE 45 MG: 10 INJECTION, SOLUTION INTRAVENOUS at 10:09

## 2019-09-05 NOTE — OP NOTE
This is Dr. Julien dictating operative note on the 5th September 2019.  Preoperative diagnosis:  Severe atherosclerotic coronary artery disease.  Postop diagnosis:  Same.  Operation:  Coronary artery bypass grafting x3 using left internal mammary artery to left anterior descending coronary artery and separate segments of saphenous vein from the aorta to the posterior descending coronary artery in from the aorta to the obtuse marginal coronary artery using a combination of antegrade and retrograde cardioplegia, mild systemic hypothermia an endoscopic vein harvest from the right leg.  Operation in detail:  The patient is prepped and draped in usual sterile fashion.  Saphenous vein was harvested from the right leg with the Terumo endoscopic harvest system.  The side branches were initially controlled with electrocautery.  The side branches were reinforced with hemoclips and silk ties once the vein was excised.  The leg was closed in a single layer of the subcutaneous tissue with running 2 0 Monocryl stitch and the skin was closed with running 3 0 subcuticular Monocryl stitch.  Of note incision was made on the left leg to try and expose the saphenous vein for harvest.  The vein was not satisfactory at the level just above the knee.  This incision was also closed with running 2 0 Monocryl stitch of subcutaneous tissue and the skin was closed with running 3 0 subcuticular Monocryl stitch.  The chest was opened through median sternotomy and the left internal mammary artery was harvested with the cautery and hemoclips.  After heparinization mammary artery was divided distally and found have satisfactory flow.  The pericardium was incised and retraction sutures placed.  The patient was cannulated using the aorta and right atrial appendage into stage cannulas venous drainage.  Antegrade and retrograde cardioplegia lines were placed in the aorta and right atrial free wall and into the coronary sinus respectively.  The  patient was placed on cardiopulmonary bypass and cooled to systemic moderate hypothermia.  The cross-clamp was applied and the heart arrested initially with antegrade cardioplegia and then intermittently with retrograde cardioplegia.  Once the heart was satisfactorily arrested the bypasses were done 1st with saphenous vein sewn in end-to-side fashion to the posterior descending coronary artery with running 7 0 Prolene suture.  The graft was measured to length and cut and sewn proximally to the aorta in an end-to-side fashion with running 6 0 Prolene suture.  The vein was then next sewn in an end-to-side fashion to the obtuse marginal coronary artery with running 7 0 Prolene suture.  The graft was measured to length and cut and sewn proximally to the aorta in an end-to-side fashion with running 6 0 Prolene suture.  The left internal mammary artery was sewn to the left anterior descending coronary artery in an end-to-side fashion with running 7 0 Prolene suture.  Flow was checked in seem to be satisfactory.  The pedicle was fixed to the epicardium with interrupted 6 0 Prolene suture.  The patient was rewarming during this time and the cross-clamp released.  The patient was weaned from cardiopulmonary bypass once satisfactory hemodynamics were maintained and rewarming complete.  Chest tubes were inserted into the left hemithorax and 2 into the anterior mediastinum through separate stab wounds and secured to skin with 0 silk suture.  Two pacing wires were placed on the right ventricle and brought out through separate stab wounds and secured to skin with 2 0 silk suture.  Once the patient was doing well decannulation was performed and the site secured with their respective pursestrings and oversewn with Prolene suture.  Closure of the sternum was done with interrupted stainless steel wire.  The presternal fashion subcutaneous tissues were run closed with 0 in 2 0 Monocryl stitch respectively.  The skin was closed with  running 3 0 subcuticular Monocryl stitch.  Overall the patient tolerated the procedure well with no major obvious intraoperative complications.  Estimated blood loss was 500 cc.  The patient was brought to the ICU in satisfactory condition.

## 2019-09-05 NOTE — TRANSFER OF CARE
"Anesthesia Transfer of Care Note    Patient: Nancy Muñoz    Procedure(s) Performed: Procedure(s) (LRB):  CABG w/ EVH (N/A)  HARVEST-VEIN-ENDOVASCULAR (N/A)    Patient location: ICU    Anesthesia Type: general    Transport from OR: Transported from OR intubated on 100% O2 by AMBU with assisted ventilation    Post pain: adequate analgesia    Post assessment: no apparent anesthetic complications    Post vital signs: stable    Level of consciousness: sedated    Nausea/Vomiting: no nausea/vomiting    Complications: none    Transfer of care protocol was followedComments: Patient transferred from OR to ICU with cardiac monitor and ambu bag 100% 15/min. Patient remain intubated and sedated. Vital signs are stable. Report given to RN.       Last vitals:   Visit Vitals  BP (!) 100/53 (BP Location: Right arm, Patient Position: Lying)   Pulse 85   Temp 36 °C (96.8 °F) (Oral)   Resp 16   Ht 5' 3.5" (1.613 m)   Wt 69.7 kg (153 lb 10.6 oz)   LMP 01/27/2002 (Approximate)   SpO2 100%   Breastfeeding? No   BMI 26.79 kg/m²     "

## 2019-09-05 NOTE — PLAN OF CARE
This note also relates to the following rows which could not be included:  Oxygen Concentration (%) - Cannot attach notes to unvalidated device data  SpO2 - Cannot attach notes to unvalidated device data  Pulse - Cannot attach notes to unvalidated device data  Resp - Cannot attach notes to unvalidated device data  Ventilation Type - Cannot attach notes to unvalidated device data  Vent Mode - Cannot attach notes to unvalidated device data  Set Rate - Cannot attach notes to unvalidated device data  Vt Set - Cannot attach notes to unvalidated device data  PEEP/CPAP - Cannot attach notes to unvalidated device data  Pressure Support - Cannot attach notes to unvalidated device data  Waveform - Cannot attach notes to unvalidated device data  Peak Flow - Cannot attach notes to unvalidated device data  Plateau Set/Insp. Hold (sec) - Cannot attach notes to unvalidated device data  Insp Rise Time  - Cannot attach notes to unvalidated device data  Trigger Sensitivity Flow/I-Trigger - Cannot attach notes to unvalidated device data  Resp Rate Total - Cannot attach notes to unvalidated device data  Peak Airway Pressure - Cannot attach notes to unvalidated device data  Mean Airway Pressure - Cannot attach notes to unvalidated device data  Plateau Pressure - Cannot attach notes to unvalidated device data  Exhaled Vt - Cannot attach notes to unvalidated device data  Total Ve - Cannot attach notes to unvalidated device data  Spont Ve - Cannot attach notes to unvalidated device data  I:E Ratio Measured - Cannot attach notes to unvalidated device data  Resp Rate High Alarm - Cannot attach notes to unvalidated device data  Press High Alarm - Cannot attach notes to unvalidated device data  Apnea Rate - Cannot attach notes to unvalidated device data  Apnea Volume (mL) - Cannot attach notes to unvalidated device data  Apnea Oxygen Concentration  - Cannot attach notes to unvalidated device data  Apnea Flow Rate (L/min) - Cannot attach notes  to unvalidated device data  T Apnea - Cannot attach notes to unvalidated device data       09/05/19 1621   Patient Assessment/Suction   Level of Consciousness (AVPU) unresponsive   All Lung Fields Breath Sounds diminished;clear   PRE-TX-O2   O2 Device (Oxygen Therapy) ventilator   $ Is the patient on Low Flow Oxygen? Yes   Pulse Oximetry Type Continuous   $ Pulse Oximetry - Multiple Charge Pulse Oximetry - Multiple   Vent Select   Conventional Vent Y   Ventilator Initiated Yes   $ Ventilator Initial 1   Charged w/in last 24h YES   Preset Conventional Ventilator Settings   Vent ID [id number]   Vent Type

## 2019-09-05 NOTE — PROGRESS NOTES
Patient in ICU status post coronary artery bypass graft surgery earlier today.  Portable chest x-ray obtained which shows endotracheal tube and central venous access lines all in good position. There is no evidence of pneumothorax or other line related complications at this time.    We will follow up with patient tomorrow morning for full postoperative evaluation.

## 2019-09-05 NOTE — ANESTHESIA PROCEDURE NOTES
Central Line    Diagnosis: CABG  Patient location during procedure: done in OR  Procedure start time: 9/5/2019 10:49 AM  Timeout: 9/5/2019 10:47 AM  Procedure end time: 9/5/2019 11:03 AM    Staffing  Authorizing Provider: Billy Zhang MD  Performing Provider: Billy Zhang MD    Staffing  Anesthesiologist: Billy Zhang MD  Performed: anesthesiologist   Anesthesiologist was present at the time of the procedure.  Preanesthetic Checklist  Completed: patient identified, site marked, surgical consent, pre-op evaluation, timeout performed, IV checked, risks and benefits discussed, monitors and equipment checked and anesthesia consent given  Indication   Indication: hemodynamic monitoring, vascular access, med administration     Anesthesia   general anesthesia    Central Line   Skin Prep: skin prepped with ChloraPrep, skin prep agent completely dried prior to procedure  maximum sterile barriers used during central venous catheter insertion  hand hygiene performed prior to central venous catheter insertion  Location: right, internal jugular.   Catheter type: triple lumen  Catheter Size: 7 Fr  Ultrasound: vascular probe with ultrasound  Vessel Caliber: medium, patent  Vascular Doppler:  not done, compressibility normal  Needle advanced into vessel with real time Ultrasound guidance.  Guidewire confirmed in vessel.  Manometry: none  Insertion Attempts: 1   Securement:line sutured, sterile dressing applied, chlorhexidine patch and blood return through all ports    Post-Procedure   Adverse Events:none    Guidewire Guidewire removed intact.

## 2019-09-05 NOTE — ANESTHESIA PROCEDURE NOTES
[place] Line    Diagnosis: CABG  Patient location during procedure: done in OR  Procedure start time: 9/5/2019 11:49 AM  Timeout: 9/5/2019 11:48 AM  Procedure end time: 9/5/2019 11:56 AM    Staffing  Authorizing Provider: Billy Zhang MD  Performing Provider: Billy Zhang MD    Anesthesiologist was present at the time of the procedure.  Preanesthetic Checklist  Completed: patient identified, site marked, surgical consent, pre-op evaluation, timeout performed, IV checked, risks and benefits discussed, monitors and equipment checked and anesthesia consent given  [place] Line  Skin Prep: chlorhexidine gluconate  Local Infiltration: none  Location: right,  internal jugular vein  Vessel Caliber: medium, patent, compressibility normal  Introducer: 9 Fr single lumen, manometry not used.  Device: CCO/Oximetric Catheter  Catheter Size: 7.5 Fr  Catheter placement by yes. Heme positive aspiration all ports. PAC floated with balloon up until wedgedSterile sheath used  Locked at: 48 cm.Insertion Attempts: 1  Indication: hemodynamic monitoring  Ultrasound Guidance  Needle advanced into vessel with real time Ultrasound guidance.  Guidewire confirmed in vessel.  Sterile sheath used.  Assessment  Central Line Bundle Protocol followed. Hand hygiene before procedure, surgical cap worn, surgical mask worn, sterile surgical gloves worn, large sterile drape used.  Dressing: sutured in place and taped and tegaderm  Patient: Tolerated Well

## 2019-09-05 NOTE — ANESTHESIA PREPROCEDURE EVALUATION
09/05/2019  Nancy Muñoz is a 63 y.o., female.    Patient Active Problem List   Diagnosis    Lumbar radiculitis    Diverticulitis    Lower abdominal pain    Hypertension    Hypercalcemia    Abnormal abdominal CT scan    Cholecystitis    NSTEMI (non-ST elevated myocardial infarction)       Results for orders placed or performed during the hospital encounter of 08/25/19   EKG 12-lead    Collection Time: 08/29/19 12:39 PM    Narrative    Test Reason : R07.9,    Vent. Rate : 072 BPM     Atrial Rate : 072 BPM     P-R Int : 154 ms          QRS Dur : 078 ms      QT Int : 370 ms       P-R-T Axes : 066 -06 062 degrees     QTc Int : 405 ms    Normal sinus rhythm  Possible Left atrial enlargement  Inferior infarct ,age undetermined  Abnormal ECG  When compared with ECG of 27-AUG-2019 13:09,  Inferior infarct is now Present  Confirmed by Luz GIBBS, Jorge ([id number]) on 9/3/2019 8:24:42 AM    Referred By: JULIA FORREST           Confirmed By:Jorge Escobar MD        Lab Results   Component Value Date    WBC 7.01 09/04/2019    HGB 15.8 09/04/2019    HCT 46.9 09/04/2019    MCV 96 09/04/2019     09/04/2019     BMP  Lab Results   Component Value Date     09/04/2019    K 3.9 09/04/2019     09/04/2019    CO2 31 (H) 09/04/2019    BUN 28 (H) 09/04/2019    CREATININE 0.9 09/04/2019    CALCIUM 11.2 (H) 09/04/2019    ANIONGAP 8 09/04/2019    ESTGFRAFRICA >60.0 09/04/2019    EGFRNONAA >60.0 09/04/2019     CXR 8/25/19    Impression       No acute radiographic abnormality in the chest.     Stess/Echo 8/26/19    · Concentric left ventricular remodeling.  · Normal left ventricular systolic function. The estimated ejection fraction is 60%  · Normal LV diastolic function.  · No wall motion abnormalities.  · Normal right ventricular systolic function.    Cardiac   Cath  8/27/19    · Three vessel coronary artery  disease.  · Prox LAD lesion , 70% stenosed.  · Ost LAD to Prox LAD lesion , 50% stenosed.  · Prox Cx lesion , 95% stenosed.  · Mid RCA lesion , 99% stenosed reduced to 90%..  · Prox RCA to Mid RCA lesion , 99% stenosed.  · A STENT RUSTAM KDOI 2.5 X 30 stent was not successfully placed.  · Estimated blood loss: none  · The left ventricular systolic function is normal.  · The ejection fraction is greater than 55% by visual estimate.  · Mild (2+) mitral regurgitation.  · No aortic valve stenosis.  · LVEDP (Pre): 12    Pre-op Assessment    I have reviewed the Patient Summary Reports.    I have reviewed the Nursing Notes.   I have reviewed the Medications.     Review of Systems  Anesthesia Hx:  History of prior surgery of interest to airway management or planning: Previous anesthesia: General Denies Family Hx of Anesthesia complications.   Denies Personal Hx of Anesthesia complications.   Social:  Smoker    Cardiovascular:   Hypertension, well controlled Past MI (hx of NSTEMI ) CAD (3 vessel disease)      Pulmonary:   Asthma mild    Renal/:   Chronic Renal Disease renal calculi    Musculoskeletal:  Spine Disorders: chronic LBP, chronic neck pain, FROM without exacerbation.    Neurological:   Neuromuscular Disease, (lumbar radicular pain, chronic)   Peripheral Neuropathy: bilateral upper extremity intermittent numbness.    Endocrine:   Hyperthyroidism (on synthroid)        Physical Exam  General:  Well nourished    Airway/Jaw/Neck:  Airway Findings: Mouth Opening: Normal Tongue: Normal  General Airway Assessment: Adult  Mallampati: II  Improves to II with phonation.  TM Distance: Normal, at least 6 cm  Jaw/Neck Findings:  Neck ROM: Normal ROM       Chest/Lungs:  Chest/Lungs Findings: Clear to auscultation, Normal Respiratory Rate     Heart/Vascular:  Heart Findings: Rate: Normal  Rhythm: Regular Rhythm  Sounds: Normal     Abdomen:  Abdomen Findings: Normal    Musculoskeletal:  Musculoskeletal Findings: Normal   Skin:  Skin  Findings: Normal    Mental Status:  Mental Status Findings:  Cooperative, Alert and Oriented         Anesthesia Plan  Type of Anesthesia, risks & benefits discussed:  Anesthesia Type:  general  Patient's Preference:   Intra-op Monitoring Plan: standard ASA monitors, arterial line, central line and [place]  Intra-op Monitoring Plan Comments:   Post Op Pain Control Plan: per primary service following discharge from PACU  Post Op Pain Control Plan Comments:   Induction:   IV  Beta Blocker:  Patient is on a Beta-Blocker and has received one dose within the past 24 hours (No further documentation required).       Informed Consent: Patient understands risks and agrees with Anesthesia plan.  Questions answered. Anesthesia consent signed with patient.  ASA Score: 3     Day of Surgery Review of History & Physical: I have interviewed and examined the patient. I have reviewed the patient's H&P dated: 8/25/19. There are no significant changes.      Anesthesia Plan Notes: GETA  A-line, Central Line, PA Ruby Larose/Tan

## 2019-09-05 NOTE — ANESTHESIA PROCEDURE NOTES
Arterial    Diagnosis: hemodynamic monitoring    Patient location during procedure: ICU  Procedure start time: 9/5/2019 10:33 AM and 9/5/2019 11:43 AM  Timeout: 9/5/2019 10:39 AM  Procedure end time: 9/5/2019 11:49 AM    Staffing  Authorizing Provider: Billy Zhang MD  Performing Provider: Billy Zhang MD    Anesthesiologist was present at the time of the procedure.    Preanesthetic Checklist  Completed: patient identified, timeout performed, risks and benefits discussed, monitors and equipment checked and anesthesia consent givenArterial  Skin Prep: chlorhexidine gluconate  Local Infiltration: lidocaine  Location: radial  Catheter Size: 20 G  Catheter placement by Anatomical landmarks. Heme positive aspiration all ports.Insertion Attempts: 1  Assessment  Dressing: sutured in place and taped and tegaderm  Patient: Tolerated well             - - -

## 2019-09-06 LAB
ANION GAP SERPL CALC-SCNC: 9 MMOL/L (ref 8–16)
ANION GAP SERPL CALC-SCNC: 9 MMOL/L (ref 8–16)
BASOPHILS # BLD AUTO: 0.01 K/UL (ref 0–0.2)
BASOPHILS NFR BLD: 0.1 % (ref 0–1.9)
BUN SERPL-MCNC: 25 MG/DL (ref 8–23)
BUN SERPL-MCNC: 25 MG/DL (ref 8–23)
CALCIUM SERPL-MCNC: 8.4 MG/DL (ref 8.7–10.5)
CALCIUM SERPL-MCNC: 8.5 MG/DL (ref 8.7–10.5)
CHLORIDE SERPL-SCNC: 113 MMOL/L (ref 95–110)
CHLORIDE SERPL-SCNC: 113 MMOL/L (ref 95–110)
CO2 SERPL-SCNC: 19 MMOL/L (ref 23–29)
CO2 SERPL-SCNC: 21 MMOL/L (ref 23–29)
CREAT SERPL-MCNC: 0.7 MG/DL (ref 0.5–1.4)
CREAT SERPL-MCNC: 0.8 MG/DL (ref 0.5–1.4)
DIFFERENTIAL METHOD: ABNORMAL
EOSINOPHIL # BLD AUTO: 0 K/UL (ref 0–0.5)
EOSINOPHIL NFR BLD: 0 % (ref 0–8)
ERYTHROCYTE [DISTWIDTH] IN BLOOD BY AUTOMATED COUNT: 11.6 % (ref 11.5–14.5)
ERYTHROCYTE [DISTWIDTH] IN BLOOD BY AUTOMATED COUNT: 11.6 % (ref 11.5–14.5)
EST. GFR  (AFRICAN AMERICAN): >60 ML/MIN/1.73 M^2
EST. GFR  (AFRICAN AMERICAN): >60 ML/MIN/1.73 M^2
EST. GFR  (NON AFRICAN AMERICAN): >60 ML/MIN/1.73 M^2
EST. GFR  (NON AFRICAN AMERICAN): >60 ML/MIN/1.73 M^2
FIO2: 30
FIO2: 60
GLUCOSE SERPL-MCNC: 115 MG/DL (ref 70–110)
GLUCOSE SERPL-MCNC: 143 MG/DL (ref 70–110)
GLUCOSE SERPL-MCNC: 146 MG/DL (ref 70–110)
GLUCOSE SERPL-MCNC: 147 MG/DL (ref 70–110)
GLUCOSE SERPL-MCNC: 154 MG/DL (ref 70–110)
GLUCOSE SERPL-MCNC: 156 MG/DL (ref 70–110)
GLUCOSE SERPL-MCNC: 158 MG/DL (ref 70–110)
GLUCOSE SERPL-MCNC: 159 MG/DL (ref 70–110)
GLUCOSE SERPL-MCNC: 160 MG/DL (ref 70–110)
GLUCOSE SERPL-MCNC: 164 MG/DL (ref 70–110)
GLUCOSE SERPL-MCNC: 164 MG/DL (ref 70–110)
GLUCOSE SERPL-MCNC: 168 MG/DL (ref 70–110)
GLUCOSE SERPL-MCNC: 170 MG/DL (ref 70–110)
GLUCOSE SERPL-MCNC: 173 MG/DL (ref 70–110)
HCO3 UR-SCNC: 18.8 MMOL/L (ref 24–28)
HCO3 UR-SCNC: 19.5 MMOL/L (ref 24–28)
HCO3 UR-SCNC: 19.5 MMOL/L (ref 24–28)
HCO3 UR-SCNC: 21.3 MMOL/L (ref 24–28)
HCT VFR BLD AUTO: 25.9 % (ref 37–48.5)
HCT VFR BLD AUTO: 26.6 % (ref 37–48.5)
HCT VFR BLD CALC: 23 %PCV (ref 36–54)
HCT VFR BLD CALC: 23 %PCV (ref 36–54)
HCT VFR BLD CALC: 25 %PCV (ref 36–54)
HCT VFR BLD CALC: 25 %PCV (ref 36–54)
HGB BLD-MCNC: 8.8 G/DL (ref 12–16)
HGB BLD-MCNC: 9.1 G/DL (ref 12–16)
IMM GRANULOCYTES # BLD AUTO: 0.08 K/UL (ref 0–0.04)
IMM GRANULOCYTES NFR BLD AUTO: 0.6 % (ref 0–0.5)
INR PPP: 1.2
LYMPHOCYTES # BLD AUTO: 0.6 K/UL (ref 1–4.8)
LYMPHOCYTES NFR BLD: 3.9 % (ref 18–48)
MAGNESIUM SERPL-MCNC: 1.7 MG/DL (ref 1.6–2.6)
MAGNESIUM SERPL-MCNC: 2.2 MG/DL (ref 1.6–2.6)
MCH RBC QN AUTO: 32.5 PG (ref 27–31)
MCH RBC QN AUTO: 32.7 PG (ref 27–31)
MCHC RBC AUTO-ENTMCNC: 34 G/DL (ref 32–36)
MCHC RBC AUTO-ENTMCNC: 34.2 G/DL (ref 32–36)
MCV RBC AUTO: 96 FL (ref 82–98)
MCV RBC AUTO: 96 FL (ref 82–98)
MONOCYTES # BLD AUTO: 0.6 K/UL (ref 0.3–1)
MONOCYTES NFR BLD: 3.9 % (ref 4–15)
NEUTROPHILS # BLD AUTO: 13 K/UL (ref 1.8–7.7)
NEUTROPHILS NFR BLD: 91.5 % (ref 38–73)
NRBC BLD-RTO: 0 /100 WBC
PCO2 BLDA: 33.8 MMHG (ref 35–45)
PCO2 BLDA: 33.9 MMHG (ref 35–45)
PCO2 BLDA: 34.6 MMHG (ref 35–45)
PCO2 BLDA: 39.5 MMHG (ref 35–45)
PH SMN: 7.34 [PH] (ref 7.35–7.45)
PH SMN: 7.35 [PH] (ref 7.35–7.45)
PH SMN: 7.36 [PH] (ref 7.35–7.45)
PH SMN: 7.37 [PH] (ref 7.35–7.45)
PHOSPHATE SERPL-MCNC: 2.5 MG/DL (ref 2.7–4.5)
PHOSPHATE SERPL-MCNC: 3.2 MG/DL (ref 2.7–4.5)
PLATELET # BLD AUTO: 105 K/UL (ref 150–350)
PLATELET # BLD AUTO: 116 K/UL (ref 150–350)
PMV BLD AUTO: 10.5 FL (ref 9.2–12.9)
PMV BLD AUTO: 10.5 FL (ref 9.2–12.9)
PO2 BLDA: 102 MMHG (ref 80–100)
PO2 BLDA: 105 MMHG (ref 80–100)
PO2 BLDA: 188 MMHG (ref 80–100)
PO2 BLDA: 86 MMHG (ref 80–100)
POC BE: -4 MMOL/L
POC BE: -6 MMOL/L
POC BE: -6 MMOL/L
POC BE: -7 MMOL/L
POC IONIZED CALCIUM: 1.22 MMOL/L (ref 1.06–1.42)
POC IONIZED CALCIUM: 1.23 MMOL/L (ref 1.06–1.42)
POC IONIZED CALCIUM: 1.26 MMOL/L (ref 1.06–1.42)
POC IONIZED CALCIUM: 1.27 MMOL/L (ref 1.06–1.42)
POC SATURATED O2: 100 % (ref 95–100)
POC SATURATED O2: 96 % (ref 95–100)
POC SATURATED O2: 98 % (ref 95–100)
POC SATURATED O2: 98 % (ref 95–100)
POC TCO2: 20 MMOL/L (ref 23–27)
POC TCO2: 21 MMOL/L (ref 23–27)
POC TCO2: 21 MMOL/L (ref 23–27)
POC TCO2: 22 MMOL/L (ref 23–27)
POTASSIUM BLD-SCNC: 3.4 MMOL/L (ref 3.5–5.1)
POTASSIUM BLD-SCNC: 3.6 MMOL/L (ref 3.5–5.1)
POTASSIUM BLD-SCNC: 3.9 MMOL/L (ref 3.5–5.1)
POTASSIUM BLD-SCNC: 4.2 MMOL/L (ref 3.5–5.1)
POTASSIUM SERPL-SCNC: 3.4 MMOL/L (ref 3.5–5.1)
POTASSIUM SERPL-SCNC: 3.9 MMOL/L (ref 3.5–5.1)
PROTHROMBIN TIME: 14.7 SEC (ref 11.7–14)
RBC # BLD AUTO: 2.71 M/UL (ref 4–5.4)
RBC # BLD AUTO: 2.78 M/UL (ref 4–5.4)
SAMPLE: ABNORMAL
SODIUM BLD-SCNC: 144 MMOL/L (ref 136–145)
SODIUM SERPL-SCNC: 141 MMOL/L (ref 136–145)
SODIUM SERPL-SCNC: 143 MMOL/L (ref 136–145)
WBC # BLD AUTO: 13.01 K/UL (ref 3.9–12.7)
WBC # BLD AUTO: 14.24 K/UL (ref 3.9–12.7)

## 2019-09-06 PROCEDURE — 63600175 PHARM REV CODE 636 W HCPCS: Performed by: INTERNAL MEDICINE

## 2019-09-06 PROCEDURE — 21000000 HC CCU ICU ROOM CHARGE

## 2019-09-06 PROCEDURE — 25000003 PHARM REV CODE 250: Performed by: INTERNAL MEDICINE

## 2019-09-06 PROCEDURE — 99900017 HC EXTUBATION W/PARAMETERS (STAT)

## 2019-09-06 PROCEDURE — 85027 COMPLETE CBC AUTOMATED: CPT

## 2019-09-06 PROCEDURE — 37799 UNLISTED PX VASCULAR SURGERY: CPT

## 2019-09-06 PROCEDURE — 27000683 HC PILLOW THERAPEUTIC

## 2019-09-06 PROCEDURE — 82803 BLOOD GASES ANY COMBINATION: CPT

## 2019-09-06 PROCEDURE — 99900035 HC TECH TIME PER 15 MIN (STAT)

## 2019-09-06 PROCEDURE — 25000003 PHARM REV CODE 250: Performed by: THORACIC SURGERY (CARDIOTHORACIC VASCULAR SURGERY)

## 2019-09-06 PROCEDURE — 82962 GLUCOSE BLOOD TEST: CPT

## 2019-09-06 PROCEDURE — 85610 PROTHROMBIN TIME: CPT

## 2019-09-06 PROCEDURE — C9113 INJ PANTOPRAZOLE SODIUM, VIA: HCPCS | Performed by: THORACIC SURGERY (CARDIOTHORACIC VASCULAR SURGERY)

## 2019-09-06 PROCEDURE — 25000003 PHARM REV CODE 250

## 2019-09-06 PROCEDURE — 93005 ELECTROCARDIOGRAM TRACING: CPT

## 2019-09-06 PROCEDURE — 83735 ASSAY OF MAGNESIUM: CPT | Mod: 91

## 2019-09-06 PROCEDURE — 82330 ASSAY OF CALCIUM: CPT

## 2019-09-06 PROCEDURE — 63600175 PHARM REV CODE 636 W HCPCS: Performed by: THORACIC SURGERY (CARDIOTHORACIC VASCULAR SURGERY)

## 2019-09-06 PROCEDURE — 84100 ASSAY OF PHOSPHORUS: CPT

## 2019-09-06 PROCEDURE — 80048 BASIC METABOLIC PNL TOTAL CA: CPT

## 2019-09-06 PROCEDURE — 84132 ASSAY OF SERUM POTASSIUM: CPT

## 2019-09-06 PROCEDURE — 85014 HEMATOCRIT: CPT

## 2019-09-06 PROCEDURE — 27000221 HC OXYGEN, UP TO 24 HOURS

## 2019-09-06 PROCEDURE — 94770 HC EXHALED C02 TEST: CPT

## 2019-09-06 PROCEDURE — 94761 N-INVAS EAR/PLS OXIMETRY MLT: CPT

## 2019-09-06 PROCEDURE — 83735 ASSAY OF MAGNESIUM: CPT

## 2019-09-06 PROCEDURE — 84295 ASSAY OF SERUM SODIUM: CPT

## 2019-09-06 PROCEDURE — 80048 BASIC METABOLIC PNL TOTAL CA: CPT | Mod: 91

## 2019-09-06 PROCEDURE — 85025 COMPLETE CBC W/AUTO DIFF WBC: CPT

## 2019-09-06 PROCEDURE — 84100 ASSAY OF PHOSPHORUS: CPT | Mod: 91

## 2019-09-06 PROCEDURE — 20000000 HC ICU ROOM

## 2019-09-06 RX ORDER — KETOROLAC TROMETHAMINE 30 MG/ML
30 INJECTION, SOLUTION INTRAMUSCULAR; INTRAVENOUS EVERY 6 HOURS PRN
Status: DISPENSED | OUTPATIENT
Start: 2019-09-06 | End: 2019-09-09

## 2019-09-06 RX ORDER — METOPROLOL SUCCINATE 25 MG/1
25 TABLET, EXTENDED RELEASE ORAL ONCE
Status: COMPLETED | OUTPATIENT
Start: 2019-09-06 | End: 2019-09-06

## 2019-09-06 RX ORDER — METOPROLOL SUCCINATE 25 MG/1
25 TABLET, EXTENDED RELEASE ORAL DAILY
Status: DISCONTINUED | OUTPATIENT
Start: 2019-09-06 | End: 2019-09-06

## 2019-09-06 RX ORDER — METOPROLOL TARTRATE 50 MG/1
50 TABLET ORAL 2 TIMES DAILY
Status: DISCONTINUED | OUTPATIENT
Start: 2019-09-06 | End: 2019-09-06

## 2019-09-06 RX ORDER — METOPROLOL SUCCINATE 25 MG/1
25 TABLET, EXTENDED RELEASE ORAL 2 TIMES DAILY
Status: DISCONTINUED | OUTPATIENT
Start: 2019-09-06 | End: 2019-09-08

## 2019-09-06 RX ORDER — CHLORHEXIDINE GLUCONATE ORAL RINSE 1.2 MG/ML
15 SOLUTION DENTAL 2 TIMES DAILY
Status: DISCONTINUED | OUTPATIENT
Start: 2019-09-06 | End: 2019-09-10 | Stop reason: HOSPADM

## 2019-09-06 RX ADMIN — ONDANSETRON 4 MG: 2 INJECTION INTRAMUSCULAR; INTRAVENOUS at 04:09

## 2019-09-06 RX ADMIN — SODIUM CHLORIDE 500 ML: 0.9 INJECTION, SOLUTION INTRAVENOUS at 10:09

## 2019-09-06 RX ADMIN — MAGNESIUM SULFATE 2 G: 2 INJECTION INTRAVENOUS at 01:09

## 2019-09-06 RX ADMIN — ASPIRIN 81 MG: 81 TABLET, COATED ORAL at 08:09

## 2019-09-06 RX ADMIN — CEFAZOLIN SODIUM 2 G: 2 SOLUTION INTRAVENOUS at 06:09

## 2019-09-06 RX ADMIN — MUPIROCIN 1 G: 20 OINTMENT TOPICAL at 08:09

## 2019-09-06 RX ADMIN — SODIUM PHOSPHATE, MONOBASIC, MONOHYDRATE AND SODIUM PHOSPHATE, DIBASIC, ANHYDROUS 15 MMOL: 276; 142 INJECTION, SOLUTION INTRAVENOUS at 02:09

## 2019-09-06 RX ADMIN — METOPROLOL SUCCINATE 25 MG: 25 TABLET, FILM COATED, EXTENDED RELEASE ORAL at 08:09

## 2019-09-06 RX ADMIN — CEFAZOLIN SODIUM 2 G: 2 SOLUTION INTRAVENOUS at 12:09

## 2019-09-06 RX ADMIN — KETOROLAC TROMETHAMINE 30 MG: 30 INJECTION, SOLUTION INTRAMUSCULAR at 06:09

## 2019-09-06 RX ADMIN — CHLORHEXIDINE GLUCONATE 15 ML: 1.2 RINSE ORAL at 10:09

## 2019-09-06 RX ADMIN — MUPIROCIN 1 G: 20 OINTMENT TOPICAL at 09:09

## 2019-09-06 RX ADMIN — CHLORHEXIDINE GLUCONATE 15 ML: 1.2 RINSE ORAL at 08:09

## 2019-09-06 RX ADMIN — ATORVASTATIN CALCIUM 10 MG: 10 TABLET, FILM COATED ORAL at 08:09

## 2019-09-06 RX ADMIN — DOCUSATE SODIUM 100 MG: 100 CAPSULE, LIQUID FILLED ORAL at 08:09

## 2019-09-06 RX ADMIN — HYDROCODONE BITARTRATE AND ACETAMINOPHEN 1 TABLET: 5; 325 TABLET ORAL at 11:09

## 2019-09-06 RX ADMIN — MORPHINE SULFATE 2 MG: 2 INJECTION, SOLUTION INTRAMUSCULAR; INTRAVENOUS at 08:09

## 2019-09-06 RX ADMIN — MORPHINE SULFATE 2 MG: 2 INJECTION, SOLUTION INTRAMUSCULAR; INTRAVENOUS at 11:09

## 2019-09-06 RX ADMIN — POTASSIUM CHLORIDE 60 MEQ: 14.9 INJECTION, SOLUTION INTRAVENOUS at 05:09

## 2019-09-06 RX ADMIN — PANTOPRAZOLE SODIUM 40 MG: 40 INJECTION, POWDER, LYOPHILIZED, FOR SOLUTION INTRAVENOUS at 08:09

## 2019-09-06 RX ADMIN — HYDROCODONE BITARTRATE AND ACETAMINOPHEN 1 TABLET: 5; 325 TABLET ORAL at 05:09

## 2019-09-06 RX ADMIN — HYDROCODONE BITARTRATE AND ACETAMINOPHEN 1 TABLET: 5; 325 TABLET ORAL at 08:09

## 2019-09-06 RX ADMIN — SODIUM CHLORIDE: 0.45 INJECTION, SOLUTION INTRAVENOUS at 04:09

## 2019-09-06 RX ADMIN — METOPROLOL SUCCINATE 25 MG: 25 TABLET, EXTENDED RELEASE ORAL at 10:09

## 2019-09-06 RX ADMIN — METOPROLOL SUCCINATE 25 MG: 25 TABLET, FILM COATED, EXTENDED RELEASE ORAL at 06:09

## 2019-09-06 RX ADMIN — MORPHINE SULFATE 2 MG: 2 INJECTION, SOLUTION INTRAMUSCULAR; INTRAVENOUS at 04:09

## 2019-09-06 RX ADMIN — KETOROLAC TROMETHAMINE 30 MG: 30 INJECTION, SOLUTION INTRAMUSCULAR at 12:09

## 2019-09-06 NOTE — NURSING
Patient S/P CABG x 3 with Dr. Julien. Patient arrived to room at 1600. No pressors. VSS. Will wean vent as tolerated.

## 2019-09-06 NOTE — PROGRESS NOTES
[facility]  Adult Nutrition  Progress Note    SUMMARY       Recommendations    Recommendation/Intervention:   1. Continue current diet, encourage PO intake of meals and provide foods patient can tolerate.   2. RD added ensure enlive TID (to provide 1050 kcal/day and 60 g/day protein).   3. RD educated pateint on heart healthy diet and diet for divertiiculitis.   · RD educated patient and family on heart healthy diet. Patient and daughters also requated diverticulitis diet education. RD educated patient and family on diverticulitis recommendations. Provided written heart healthy nutrition therapy, high fiber nutrition therapy and low fiber nutrition therapy. Provided RD contact info should questions arise. Patient and family expressed understanding of diet recommendations.     Goals:   1. Patient to meet at least 75% of estimated needs PO via meals and supplments.   2. Patient to express understanding of diet recommendations. - met     Nutrition Goal Status: new  Communication of RD Recs: reviewed with RN    Reason for Assessment    Reason For Assessment: other (see comments)(ICU, RD identified )  Diagnosis: surgery/postoperative complications, cardiac disease  Patient Active Problem List   Diagnosis    Lumbar radiculitis    Diverticulitis    Lower abdominal pain    Hypertension    Hypercalcemia    Abnormal abdominal CT scan    Cholecystitis    NSTEMI (non-ST elevated myocardial infarction)    Atherosclerosis of native coronary artery of native heart    CAD (coronary artery disease)     General Information Comments: s/p CABG     Nutrition Risk Screen    Nutrition Risk Screen: no indicators present    Nutrition/Diet History    Patient Reported Diet/Restrictions/Preferences: general  Spiritual, Cultural Beliefs, Congregation Practices, Values that Affect Care: no  Food Allergies: other (see comments)(Pineapple )  Factors Affecting Nutritional Intake: decreased appetite(s/p surgery, intubated for  "sx, first real meal today)    Anthropometrics    Temp: 98.4 °F (36.9 °C)  Height: 5' 3.5" (161.3 cm)  Height (inches): 63.5 in  Weight Method: Bed Scale  Weight: 70 kg (154 lb 5.2 oz)  Weight (lb): 154.32 lb  Ideal Body Weight (IBW), Female: 117.5 lb  % Ideal Body Weight, Female (lb): 131.34 lb  BMI (Calculated): 27  BMI Grade: 25 - 29.9 - overweight       Lab/Procedures/Meds    Pertinent Labs Reviewed: reviewed  CBC:  Recent Labs   Lab 09/06/19 0429 09/06/19  0544   WBC 14.24*  --    HGB 9.1*  --    HCT 26.6* 25*   *  --      CMP:  Recent Labs   Lab 09/06/19 0429   CALCIUM 8.5*      K 3.4*   CO2 21*   *   BUN 25*   CREATININE 0.7     Lab Results   Component Value Date    HGBA1C 5.2 02/23/2018     Pertinent Medications Reviewed: reviewed  Scheduled Meds:   aspirin  81 mg Oral Daily    atorvastatin  10 mg Oral Daily    chlorhexidine  15 mL Mouth/Throat BID    docusate sodium  100 mg Oral BID    metoprolol succinate  25 mg Oral BID    mupirocin  1 g Nasal BID    pantoprazole  40 mg Intravenous Daily     Continuous Infusions:   sodium chloride 0.45% 50 mL/hr at 09/06/19 0600    clevidipine       PRN Meds:.calcium gluconate IVPB, calcium gluconate IVPB, calcium gluconate IVPB, dextrose 50%, dextrose 50%, HYDROcodone-acetaminophen, ketorolac, magnesium sulfate IVPB, magnesium sulfate IVPB, morphine, morphine, ondansetron, potassium chloride in water **AND** potassium chloride in water, sodium phosphate IVPB, sodium phosphate IVPB, sodium phosphate IVPB    Estimated/Assessed Needs    Weight Used For Calorie Calculations: 70 kg (154 lb 5.2 oz)  Energy Calorie Requirements (kcal): 1750 - 2100 (25 - 30)   Energy Need Method: Kcal/kg  Protein Requirements: 84 - 105 (1.2 -1.5 g/kg)   Weight Used For Protein Calculations: 70 kg (154 lb 5.2 oz)     Estimated Fluid Requirement Method: RDA Method  RDA Method (mL): 1750       Nutrition Prescription Ordered    Current Diet Order: cardiac     Evaluation " of Received Nutrient/Fluid Intake    Energy Calories Required: not meeting needs  Protein Required: not meeting needs  Fluid Required: not meeting needs  Comments: Patient has a decreased appeite after surgery. Poor intake. RD offered supplement, pt accepted.   Tolerance: tolerating  % Intake of Estimated Energy Needs: 0 - 25 %  % Meal Intake: 0 - 25 %    Nutrition Risk    Level of Risk/Frequency of Follow-up: high     Monitor and Evaluation    Food and Nutrient Intake: food and beverage intake  Food and Nutrient Adminstration: diet order  Knowledge/Beliefs/Attitudes: beliefs and attitudes, food and nutrition knowledge/skill  Physical Activity and Function: nutrition-related ADLs and IADLs  Anthropometric Measurements: weight change  Biochemical Data, Medical Tests and Procedures: electrolyte and renal panel, lipid profile, gastrointestinal profile, glucose/endocrine profile, inflammatory profile  Nutrition-Focused Physical Findings: overall appearance     Nutrition Follow-Up    RD Follow-up?: Yes    Sandrita Gamino RD 09/06/2019 6:53 PM

## 2019-09-06 NOTE — CARE UPDATE
09/05/19 1926   Patient Assessment/Suction   Level of Consciousness (AVPU) unresponsive   All Lung Fields Breath Sounds diminished   PRE-TX-O2   O2 Device (Oxygen Therapy) ventilator   $ Is the patient on Low Flow Oxygen? Yes   Oxygen Concentration (%) 40   SpO2 99 %   Pulse Oximetry Type Continuous   $ Pulse Oximetry - Multiple Charge Pulse Oximetry - Multiple   Pulse 89   Resp 16   ETCO2   $ ETCO2 Charge Exhaled CO2 Monitoring   $ ETCO2 Usage Currently wearing   ETCO2 (mmHg) 31 mmHg   ETCO2 Device Type Bedside Monitor   Vent Select   Conventional Vent Y   Charged w/in last 24h YES   Preset Conventional Ventilator Settings   Vent ID [id number]   Vent Type    Ventilation Type VC   Vent Mode SIMV   Humidity HME   Set Rate 16 bmp   Vt Set 500 mL   PEEP/CPAP 5 cmH20   Pressure Support 10 cmH20   Waveform RAMP   Peak Flow 50 L/min   Plateau Set/Insp. Hold (sec) 0   Insp Rise Time  50 %   Trigger Sensitivity Flow/I-Trigger 3 L/min   Patient Ventilator Parameters   Resp Rate Total 16 br/min   Peak Airway Pressure 21 cmH2O   Mean Airway Pressure 11 cmH20   Plateau Pressure 0 cmH20   Exhaled Vt 515 mL   Total Ve 8.21 mL   Spont Ve 0 L   I:E Ratio Measured 1:2.40   Conventional Ventilator Alarms   Alarms On Y   Resp Rate High Alarm 50 br/min   Press High Alarm 60 cmH2O   Apnea Rate 12   Apnea Volume (mL) 0 mL   Apnea Oxygen Concentration  100   Apnea Flow Rate (L/min) 44   T Apnea 20 sec(s)   Ready to Wean/Extubation Screen   FIO2<=50 (chart decimal) 0.4   MV<16L (chart vol.) 8.21   PEEP <=8 (chart #) 5   Ready to Wean Parameters   F/VT Ratio<105 (RSBI) (!) 31.07

## 2019-09-06 NOTE — PLAN OF CARE
Problem: Oral Intake Inadequate  Goal: Improved Oral Intake  Outcome: Ongoing (interventions implemented as appropriate)  Decreased appetite and poor intake after surgery. Provide foods patient can tolerate. Added ensure enlive TID. Encourage PO intake.

## 2019-09-06 NOTE — CARE UPDATE
Patient still very asleep leaving patient intubated till she can lift her head and wake up a little more.

## 2019-09-06 NOTE — PROGRESS NOTES
This patient is status post coronary artery bypass grafting.  She is doing well.  Her vital signs are stable.  She is extubated and alert.  Her surgical wounds are dressed.  Her chest tube output is minimal.  At this point she is doing well status post coronary artery bypass grafting.  Activity will be increased.  Central access will be removed.  Her urinary catheter can be removed as well.  She will be mobilized this morning.

## 2019-09-06 NOTE — PROGRESS NOTES
[facility]     Nancy Phillipsn   [id number]   9/6/2019         Cardiac Rehab Phase Taught: Phase 1    Teaching Method: Verbal, Written, Audio/Visual    Handouts: Activity Illustrations/Plan Sheet    Educational Videos: Recovery - Your First Few Days    Understanding:  Learning indicated by feedback and Verbalize understanding    Comments: pt sitting up in chair, family at bedside. Review of sternal precautions, activity, pain meds, use of IS. Pt/family voiced understanding    Total time spent: 30mins            Ina Rcio RN

## 2019-09-06 NOTE — CONSULTS
[facility]  Cardiology  Consult Note    Patient Name: Nancy Muñoz  MRN: [id number]  Admission Date: 9/5/2019  Hospital Length of Stay: 1 days  Code Status: Full Code   Attending Provider: Freddy Julien MD   Consulting Provider: Sean England MD  Primary Care Physician: Jan Petersen MD  Principal Problem:Atherosclerosis of native coronary artery of native heart    Patient information was obtained from patient and past medical records.     Consult to Cardiology  Consult performed by: Sean England MD  Consult ordered by: Freddy Julien MD  Reason for consult: s/p CABG  Assessment/Recommendations: 1. CAD: multivessel CAD. S/p CABG x 3V  2. HTN: Toprol initiated  3. HLP: on statin  4. Post op anemia  5. Thrombocytopenia  6. Tachycardia: sinus        Subjective:     Chief Complaint:  S/p CABG     HPI: Nancy Muñoz is a 63 year old female, s/p CABG x 3 (LIMA/LAD, SVG/OM, SVG/PDA). Recent NSTEMI. LHC revealed multivessel CAD. Extubated. Off pressors. C/o incision site pain. Tachycardic. Minimal chest tube output.    Past Medical History:   Diagnosis Date    Allergy     Arthritis     spine-ddd    Asthma     usu allergy related    Coronary artery disease 08/2019    cabg scheduled    Diverticulosis     High calcium levels 2015    Hypercholesteremia 2017    Hypertension     dr petersen & dr england    Hyperthyroidism     ???    Indigestion 08/2019    slight problem yrs ago    Lumbar radiculitis 1/30/2018    MI (myocardial infarction) 08/22/2019    angio done- cabg scheduled    NSTEMI (non-ST elevated myocardial infarction) 8/25/2019    Pain management 08/2019    dr mays ([place])    Renal stone 08/2019    to f/u with urology post op    Renal stone 2015    stone removed    Staph infection 2005    treated       Past Surgical History:   Procedure Laterality Date    ANGIOGRAM, CORONARY ARTERY Left 8/27/2019    Performed [facility] Sean England [facility] CATH[facility]EP LAB     Angiogram, Coronary, with Left Heart Cath  8/27/2019    Performed by [facility]am [facility] at [facility] CATH/EP LAB    APPENDECTOMY      CHOLECYSTECTOMY-LAPAROSCOPIC N/A 2/27/2018    [facility]    COLONOSCOPY      x 2    INJECTION-STEROID-EPIDURAL-TRANSFORAMINAL Left 1/30/2018    Performed [facility] Oziel [facility]ustos [facility] OR    PTCA, SINGLE VESSEL Right 8/27/2019    Performed by Sean Pham MD at [facility] CATH/EP LAB    SPINE SURGERY      TONSILLECTOMY         Review of patient's allergies indicates:   Allergen Reactions    Pineapple Anaphylaxis       No current facility-administered medications on file prior to encounter.      Current Outpatient Medications on File Prior to Encounter   Medication Sig    albuterol (PROAIR HFA) 90 mcg/actuation inhaler Inhale 2 puffs into the lungs every 6 (six) hours as needed for Wheezing. Rescue    aspirin (ECOTRIN) 81 MG EC tablet Take 1 tablet (81 mg total) by mouth once daily.    atorvastatin (LIPITOR) 10 MG tablet Take 10 mg by mouth once daily.    diphenhydrAMINE (BENADRYL) 25 mg capsule Take by mouth.    metoprolol succinate (TOPROL-XL) 25 MG 24 hr tablet Take 0.5 tablets (12.5 mg total) by mouth once daily.    oxyCODONE-acetaminophen (PERCOCET)  mg per tablet Take 1 tablet by mouth every 8 (eight) hours as needed for Pain. Sometimes doubles dose to cover pain    triamterene-hydrochlorothiazide 37.5-25 mg (DYAZIDE) 37.5-25 mg per capsule      Family History     None        Tobacco Use    Smoking status: Current Every Day Smoker     Packs/day: 0.50     Years: 15.00     Pack years: 7.50    Smokeless tobacco: Current User   Substance and Sexual Activity    Alcohol use: Yes     Alcohol/week: 0.6 oz     Types: 1 Glasses of wine per week    Drug use: No    Sexual activity: Yes     Review of Systems   Cardiovascular: Negative for chest pain, irregular heartbeat and palpitations.   Respiratory: Negative for cough, shortness of breath,  sputum production and wheezing.      Objective:     Vital Signs (Most Recent):  Temp: 99.4 °F (37.4 °C) (09/06/19 0600)  Pulse: (!) 112 (09/06/19 0600)  Resp: (!) 21 (09/06/19 0600)  BP: 109/63 (09/06/19 0600)  SpO2: 99 % (09/06/19 0600) Vital Signs (24h Range):  Temp:  [96.8 °F (36 °C)-99.5 °F (37.5 °C)] 99.4 °F (37.4 °C)  Pulse:  [] 112  Resp:  [0-22] 21  SpO2:  [93 %-100 %] 99 %  BP: ()/(50-72) 109/63  Arterial Line BP: ()/(49-75) 159/56     Weight: 70 kg (154 lb 5.2 oz)  Body mass index is 26.91 kg/m².    SpO2: 99 %  O2 Device (Oxygen Therapy): ventilator      Intake/Output Summary (Last 24 hours) at 9/6/2019 0734  Last data filed at 9/6/2019 0600  Gross per 24 hour   Intake 4696.83 ml   Output 4014 ml   Net 682.83 ml       Lines/Drains/Airways     Central Venous Catheter Line                 Percutaneous Central Line Insertion/Assessment - triple lumen  09/05/19 1049 less than 1 day          Drain                 Chest Tube 09/05/19 1500 1 Pleural less than 1 day         Y Chest Tube 1 and 2 09/05/19 1500 Mediastinal Mediastinal less than 1 day          Arterial Line                 Arterial Line 09/05/19 1143 Radial less than 1 day          Line                 Pacer Wires 09/05/19 1500 less than 1 day          Peripheral Intravenous Line                 Peripheral IV - Single Lumen 09/05/19 0918 20 G Right Wrist less than 1 day         Peripheral IV - Single Lumen 09/05/19 0924 18 G Left Forearm less than 1 day                Physical Exam   Constitutional: She is oriented to person, place, and time.   Cardiovascular: Intact distal pulses. Tachycardia present.   Pulmonary/Chest: She has no decreased breath sounds. She has no wheezes. She has no rhonchi. She has no rales.   Abdominal: Soft. Bowel sounds are normal.   Neurological: She is alert and oriented to person, place, and time.   Vitals reviewed.      Significant Labs:   CMP   Recent Labs   Lab 09/05/19 2005 09/06/19  0035  09/06/19 0429    141 143   K 3.1* 3.9 3.4*   * 113* 113*   CO2 19* 19* 21*   * 170* 173*   BUN 24* 25* 25*   CREATININE 0.8 0.8 0.7   CALCIUM 8.1* 8.4* 8.5*   ANIONGAP 9 9 9   ESTGFRAFRICA >60.0 >60.0 >60.0   EGFRNONAA >60.0 >60.0 >60.0   , CBC   Recent Labs   Lab 09/05/19 2005 09/06/19  0030  09/06/19 0429 09/06/19  0544   WBC 15.60*  --  13.01*  --  14.24*  --    HGB 9.8*  --  8.8*  --  9.1*  --    HCT 28.8*   < > 25.9*   < > 26.6* 25*   *  --  105*  --  116*  --     < > = values in this interval not displayed.    and Lipid Panel No results for input(s): CHOL, HDL, LDLCALC, TRIG, CHOLHDL in the last 48 hours.    Significant Imaging: X-Ray: CXR: X-Ray Chest 1 View (CXR): No results found for this visit on 09/05/19.  Assessment and Plan:     Active Diagnoses:    Diagnosis Date Noted POA    PRINCIPAL PROBLEM:  Atherosclerosis of native coronary artery of native heart [I25.10] 09/05/2019 Unknown    CAD (coronary artery disease) [I25.10] 09/05/2019 Yes      Problems Resolved During this Admission:       VTE Risk Mitigation (From admission, onward)    None          Thank you for your consult. I will follow-up with patient. Please contact us if you have any additional questions.    Sean Pham MD  Cardiology   [facility]

## 2019-09-07 LAB
ABO + RH BLD: NORMAL
ANION GAP SERPL CALC-SCNC: 4 MMOL/L (ref 8–16)
BASOPHILS # BLD AUTO: 0.01 K/UL (ref 0–0.2)
BASOPHILS NFR BLD: 0.1 % (ref 0–1.9)
BLD GP AB SCN CELLS X3 SERPL QL: NORMAL
BUN SERPL-MCNC: 36 MG/DL (ref 8–23)
CALCIUM SERPL-MCNC: 9.7 MG/DL (ref 8.7–10.5)
CHLORIDE SERPL-SCNC: 112 MMOL/L (ref 95–110)
CO2 SERPL-SCNC: 27 MMOL/L (ref 23–29)
CREAT SERPL-MCNC: 0.7 MG/DL (ref 0.5–1.4)
DIFFERENTIAL METHOD: ABNORMAL
EOSINOPHIL # BLD AUTO: 0 K/UL (ref 0–0.5)
EOSINOPHIL NFR BLD: 0 % (ref 0–8)
ERYTHROCYTE [DISTWIDTH] IN BLOOD BY AUTOMATED COUNT: 12.1 % (ref 11.5–14.5)
EST. GFR  (AFRICAN AMERICAN): >60 ML/MIN/1.73 M^2
EST. GFR  (NON AFRICAN AMERICAN): >60 ML/MIN/1.73 M^2
GLUCOSE SERPL-MCNC: 132 MG/DL (ref 70–110)
HCT VFR BLD AUTO: 22.6 % (ref 37–48.5)
HGB BLD-MCNC: 7.6 G/DL (ref 12–16)
IMM GRANULOCYTES # BLD AUTO: 0.11 K/UL (ref 0–0.04)
IMM GRANULOCYTES NFR BLD AUTO: 0.8 % (ref 0–0.5)
LYMPHOCYTES # BLD AUTO: 1.5 K/UL (ref 1–4.8)
LYMPHOCYTES NFR BLD: 11 % (ref 18–48)
MAGNESIUM SERPL-MCNC: 2.1 MG/DL (ref 1.6–2.6)
MCH RBC QN AUTO: 33.5 PG (ref 27–31)
MCHC RBC AUTO-ENTMCNC: 33.6 G/DL (ref 32–36)
MCV RBC AUTO: 100 FL (ref 82–98)
MONOCYTES # BLD AUTO: 1.1 K/UL (ref 0.3–1)
MONOCYTES NFR BLD: 8 % (ref 4–15)
NEUTROPHILS # BLD AUTO: 10.6 K/UL (ref 1.8–7.7)
NEUTROPHILS NFR BLD: 80.1 % (ref 38–73)
NRBC BLD-RTO: 0 /100 WBC
PHOSPHATE SERPL-MCNC: 1.9 MG/DL (ref 2.7–4.5)
PLATELET # BLD AUTO: 95 K/UL (ref 150–350)
PMV BLD AUTO: 10.7 FL (ref 9.2–12.9)
POTASSIUM SERPL-SCNC: 4.9 MMOL/L (ref 3.5–5.1)
RBC # BLD AUTO: 2.27 M/UL (ref 4–5.4)
SODIUM SERPL-SCNC: 143 MMOL/L (ref 136–145)
WBC # BLD AUTO: 13.22 K/UL (ref 3.9–12.7)

## 2019-09-07 PROCEDURE — 25000003 PHARM REV CODE 250: Performed by: THORACIC SURGERY (CARDIOTHORACIC VASCULAR SURGERY)

## 2019-09-07 PROCEDURE — 25000003 PHARM REV CODE 250: Performed by: INTERNAL MEDICINE

## 2019-09-07 PROCEDURE — 85025 COMPLETE CBC W/AUTO DIFF WBC: CPT

## 2019-09-07 PROCEDURE — C9113 INJ PANTOPRAZOLE SODIUM, VIA: HCPCS | Performed by: THORACIC SURGERY (CARDIOTHORACIC VASCULAR SURGERY)

## 2019-09-07 PROCEDURE — 80048 BASIC METABOLIC PNL TOTAL CA: CPT

## 2019-09-07 PROCEDURE — 99900035 HC TECH TIME PER 15 MIN (STAT)

## 2019-09-07 PROCEDURE — 94761 N-INVAS EAR/PLS OXIMETRY MLT: CPT

## 2019-09-07 PROCEDURE — 93005 ELECTROCARDIOGRAM TRACING: CPT

## 2019-09-07 PROCEDURE — 25000003 PHARM REV CODE 250

## 2019-09-07 PROCEDURE — 63600175 PHARM REV CODE 636 W HCPCS: Performed by: THORACIC SURGERY (CARDIOTHORACIC VASCULAR SURGERY)

## 2019-09-07 PROCEDURE — 83735 ASSAY OF MAGNESIUM: CPT

## 2019-09-07 PROCEDURE — 27000221 HC OXYGEN, UP TO 24 HOURS

## 2019-09-07 PROCEDURE — 20000000 HC ICU ROOM

## 2019-09-07 PROCEDURE — 84100 ASSAY OF PHOSPHORUS: CPT

## 2019-09-07 PROCEDURE — 36430 TRANSFUSION BLD/BLD COMPNT: CPT

## 2019-09-07 PROCEDURE — P9016 RBC LEUKOCYTES REDUCED: HCPCS

## 2019-09-07 PROCEDURE — 21000000 HC CCU ICU ROOM CHARGE

## 2019-09-07 RX ORDER — HYDROCODONE BITARTRATE AND ACETAMINOPHEN 500; 5 MG/1; MG/1
TABLET ORAL
Status: DISCONTINUED | OUTPATIENT
Start: 2019-09-07 | End: 2019-09-10 | Stop reason: HOSPADM

## 2019-09-07 RX ADMIN — CHLORHEXIDINE GLUCONATE 15 ML: 1.2 RINSE ORAL at 08:09

## 2019-09-07 RX ADMIN — DOCUSATE SODIUM 100 MG: 100 CAPSULE, LIQUID FILLED ORAL at 08:09

## 2019-09-07 RX ADMIN — HYDROCODONE BITARTRATE AND ACETAMINOPHEN 1 TABLET: 5; 325 TABLET ORAL at 04:09

## 2019-09-07 RX ADMIN — ASPIRIN 81 MG: 81 TABLET, COATED ORAL at 08:09

## 2019-09-07 RX ADMIN — HYDROCODONE BITARTRATE AND ACETAMINOPHEN 1 TABLET: 5; 325 TABLET ORAL at 12:09

## 2019-09-07 RX ADMIN — MUPIROCIN 1 G: 20 OINTMENT TOPICAL at 08:09

## 2019-09-07 RX ADMIN — ATORVASTATIN CALCIUM 10 MG: 10 TABLET, FILM COATED ORAL at 08:09

## 2019-09-07 RX ADMIN — HYDROCODONE BITARTRATE AND ACETAMINOPHEN 1 TABLET: 5; 325 TABLET ORAL at 08:09

## 2019-09-07 RX ADMIN — MORPHINE SULFATE 2 MG: 2 INJECTION, SOLUTION INTRAMUSCULAR; INTRAVENOUS at 02:09

## 2019-09-07 RX ADMIN — KETOROLAC TROMETHAMINE 30 MG: 30 INJECTION, SOLUTION INTRAMUSCULAR at 04:09

## 2019-09-07 RX ADMIN — MUPIROCIN 1 G: 20 OINTMENT TOPICAL at 09:09

## 2019-09-07 RX ADMIN — METOPROLOL SUCCINATE 25 MG: 25 TABLET, FILM COATED, EXTENDED RELEASE ORAL at 08:09

## 2019-09-07 RX ADMIN — SODIUM PHOSPHATE, MONOBASIC, MONOHYDRATE AND SODIUM PHOSPHATE, DIBASIC, ANHYDROUS 20.01 MMOL: 276; 142 INJECTION, SOLUTION INTRAVENOUS at 07:09

## 2019-09-07 RX ADMIN — PANTOPRAZOLE SODIUM 40 MG: 40 INJECTION, POWDER, LYOPHILIZED, FOR SOLUTION INTRAVENOUS at 08:09

## 2019-09-07 RX ADMIN — KETOROLAC TROMETHAMINE 30 MG: 30 INJECTION, SOLUTION INTRAMUSCULAR at 07:09

## 2019-09-07 RX ADMIN — KETOROLAC TROMETHAMINE 30 MG: 30 INJECTION, SOLUTION INTRAMUSCULAR at 12:09

## 2019-09-07 RX ADMIN — HYDROCODONE BITARTRATE AND ACETAMINOPHEN 1 TABLET: 5; 325 TABLET ORAL at 07:09

## 2019-09-07 NOTE — PLAN OF CARE
Problem: Adult Inpatient Plan of Care  Goal: Plan of Care Review  Patient received 2 units PRBC. Up in chair now. Minimal chest tube output.

## 2019-09-07 NOTE — CARE UPDATE
09/06/19 2005   Patient Assessment/Suction   Level of Consciousness (AVPU) alert   PRE-TX-O2   O2 Device (Oxygen Therapy) nasal cannula   $ Is the patient on Low Flow Oxygen? Yes   Flow (L/min) 1   SpO2 97 %   Pulse Oximetry Type Continuous   $ Pulse Oximetry - Multiple Charge Pulse Oximetry - Multiple   Pulse 100   Resp 14

## 2019-09-07 NOTE — PROGRESS NOTES
Progress Note  Cardiothoracic Surgery    CHIEF COMPLAINT:  CORONARY ARTERY DISEASE    Admit Date: 9/5/2019  Post-operative Day: 2 Days Post-Op  Hospital Day: 3    SUBJECTIVE:  No complaint     Follow-up For: Procedure(s) (LRB):  CABG w/ EVH (N/A)  HARVEST-VEIN-ENDOVASCULAR (N/A)    Scheduled Meds:   aspirin  81 mg Oral Daily    atorvastatin  10 mg Oral Daily    chlorhexidine  15 mL Mouth/Throat BID    docusate sodium  100 mg Oral BID    metoprolol succinate  25 mg Oral BID    mupirocin  1 g Nasal BID    pantoprazole  40 mg Intravenous Daily     Infusions/Drips:   sodium chloride 0.45% 50 mL/hr at 09/06/19 0600    clevidipine       PRN Meds: sodium chloride, calcium gluconate IVPB, calcium gluconate IVPB, calcium gluconate IVPB, dextrose 50%, dextrose 50%, HYDROcodone-acetaminophen, ketorolac, magnesium sulfate IVPB, magnesium sulfate IVPB, morphine, morphine, ondansetron, potassium chloride in water **AND** potassium chloride in water, sodium phosphate IVPB, sodium phosphate IVPB, sodium phosphate IVPB    Review of patient's allergies indicates:   Allergen Reactions    Pineapple Anaphylaxis       OBJECTIVE:     Vital Signs (Most Recent)  Temp: 98.8 °F (37.1 °C) (09/07/19 1030)  Pulse: 105 (09/07/19 1030)  Resp: (!) 22 (09/07/19 1030)  BP: (!) 94/55 (09/07/19 1030)  SpO2: (!) 94 % (09/07/19 1030)    Admission Weight: 69.7 kg (153 lb 10.6 oz) (09/05/19 0853)   Most Recent Weight: 70 kg (154 lb 5.2 oz) (09/05/19 1600)    Vital Signs Range (Last 24H):  Temp:  [97.8 °F (36.6 °C)-98.8 °F (37.1 °C)]   Pulse:  []   Resp:  [11-34]   BP: ()/(50-63)   SpO2:  [94 %-100 %]     I & O (Last 24H):    Intake/Output Summary (Last 24 hours) at 9/7/2019 1051  Last data filed at 9/7/2019 0600  Gross per 24 hour   Intake 1190 ml   Output 1900 ml   Net -710 ml     Physical Exam:  Awake and alert  Heart regular rate and rhythm  Sternum is stable    Wound/Incision:  clean, dry, intact    Laboratory:  CBC:   Recent Labs    Lab 09/07/19 0442   WBC 13.22*   RBC 2.27*   HGB 7.6*   HCT 22.6*   PLT 95*   *   MCH 33.5*   MCHC 33.6     BMP:   Recent Labs   Lab 09/07/19 0442   *      K 4.9   *   CO2 27   BUN 36*   CREATININE 0.7   CALCIUM 9.7   MG 2.1       Diagnostic Results:  X-Ray: Reviewed    ASSESSMENT/PLAN:     Assessment: Doing well, expected acute post-operative blood loss anemia.    Plan: Transfuse.  Mobilize.

## 2019-09-07 NOTE — PROGRESS NOTES
[facility]  Cardiology  Progress Note    Patient Name: Nancy Muñoz  MRN: [id number]  Admission Date: 9/5/2019  Hospital Length of Stay: 2 days  Code Status: Full Code   Attending Physician: Freddy Julien MD   Primary Care Physician: Jan Petersen MD  Expected Discharge Date:   Principal Problem:Atherosclerosis of native coronary artery of native heart    Subjective:     Hospital Course: s/p CABG x 3v    Interval History: Receiving PRBCs    Review of Systems   Constitution: Positive for malaise/fatigue.   Cardiovascular: Negative for chest pain and irregular heartbeat.   Respiratory: Negative for shortness of breath and wheezing.      Objective:     Vital Signs (Most Recent):  Temp: 97.8 °F (36.6 °C) (09/07/19 0800)  Pulse: 96 (09/07/19 1000)  Resp: 18 (09/07/19 1000)  BP: (!) 95/50 (09/07/19 1000)  SpO2: 95 % (09/07/19 1000) Vital Signs (24h Range):  Temp:  [97.8 °F (36.6 °C)-98.5 °F (36.9 °C)] 97.8 °F (36.6 °C)  Pulse:  [] 96  Resp:  [11-34] 18  SpO2:  [94 %-100 %] 95 %  BP: ()/(50-63) 95/50     Weight: 70 kg (154 lb 5.2 oz)  Body mass index is 26.91 kg/m².    SpO2: 95 %  O2 Device (Oxygen Therapy): nasal cannula      Intake/Output Summary (Last 24 hours) at 9/7/2019 1037  Last data filed at 9/7/2019 0600  Gross per 24 hour   Intake 1190 ml   Output 1900 ml   Net -710 ml       Lines/Drains/Airways     Central Venous Catheter Line                 Percutaneous Central Line Insertion/Assessment - triple lumen  09/05/19 1049 1 day          Drain                 Chest Tube 09/05/19 1500 1 Pleural 1 day         Y Chest Tube 1 and 2 09/05/19 1500 Mediastinal Mediastinal 1 day          Line                 Pacer Wires 09/05/19 1500 1 day          Peripheral Intravenous Line                 Peripheral IV - Single Lumen 09/05/19 0918 20 G Right Wrist 2 days         Peripheral IV - Single Lumen 09/05/19 0924 18 G Left Forearm 2 days                Physical Exam   Constitutional: She is  oriented to person, place, and time. No distress.   Cardiovascular: Normal rate, regular rhythm, normal heart sounds and intact distal pulses.   Pulmonary/Chest: Effort normal and breath sounds normal.   Abdominal: Soft. Bowel sounds are normal.   Neurological: She is alert and oriented to person, place, and time.   Skin: She is not diaphoretic.   Nursing note and vitals reviewed.      Significant Labs:   CMP   Recent Labs   Lab 09/06/19  0030 09/06/19 0429 09/07/19  0442    143 143   K 3.9 3.4* 4.9   * 113* 112*   CO2 19* 21* 27   * 173* 132*   BUN 25* 25* 36*   CREATININE 0.8 0.7 0.7   CALCIUM 8.4* 8.5* 9.7   ANIONGAP 9 9 4*   ESTGFRAFRICA >60.0 >60.0 >60.0   EGFRNONAA >60.0 >60.0 >60.0   , CBC   Recent Labs   Lab 09/06/19 0030  09/06/19 0429 09/07/19 0442   WBC 13.01*  --  14.24*  --  13.22*   HGB 8.8*  --  9.1*  --  7.6*   HCT 25.9*   < > 26.6*   < > 22.6*   *  --  116*  --  95*    < > = values in this interval not displayed.    and Lipid Panel No results for input(s): CHOL, HDL, LDLCALC, TRIG, CHOLHDL in the last 48 hours.    Significant Imaging: Telemetry: sinus  Assessment and Plan:     Brief HPI: 64 yo c multivessel cad. S/p cabg. Post op anemia, receiving PRBCs.    1. CAD: multivessel CAD. S/p CABG x 3V  2. HTN: monitor  3. HLP: on statin  4. Post op anemia: PRBCs        Active Diagnoses:    Diagnosis Date Noted POA    PRINCIPAL PROBLEM:  Atherosclerosis of native coronary artery of native heart [I25.10] 09/05/2019 Unknown    CAD (coronary artery disease) [I25.10] 09/05/2019 Yes      Problems Resolved During this Admission:       VTE Risk Mitigation (From admission, onward)    None          Sean Pham MD  Cardiology  [facility]

## 2019-09-08 LAB
ANION GAP SERPL CALC-SCNC: 4 MMOL/L (ref 8–16)
BASOPHILS # BLD AUTO: 0.01 K/UL (ref 0–0.2)
BASOPHILS NFR BLD: 0.1 % (ref 0–1.9)
BUN SERPL-MCNC: 39 MG/DL (ref 8–23)
CALCIUM SERPL-MCNC: 9.3 MG/DL (ref 8.7–10.5)
CHLORIDE SERPL-SCNC: 113 MMOL/L (ref 95–110)
CO2 SERPL-SCNC: 29 MMOL/L (ref 23–29)
CREAT SERPL-MCNC: 0.7 MG/DL (ref 0.5–1.4)
DIFFERENTIAL METHOD: ABNORMAL
EOSINOPHIL # BLD AUTO: 0.1 K/UL (ref 0–0.5)
EOSINOPHIL NFR BLD: 0.5 % (ref 0–8)
ERYTHROCYTE [DISTWIDTH] IN BLOOD BY AUTOMATED COUNT: 13.7 % (ref 11.5–14.5)
EST. GFR  (AFRICAN AMERICAN): >60 ML/MIN/1.73 M^2
EST. GFR  (NON AFRICAN AMERICAN): >60 ML/MIN/1.73 M^2
GLUCOSE SERPL-MCNC: 110 MG/DL (ref 70–110)
HCT VFR BLD AUTO: 27.5 % (ref 37–48.5)
HGB BLD-MCNC: 9.1 G/DL (ref 12–16)
IMM GRANULOCYTES # BLD AUTO: 0.08 K/UL (ref 0–0.04)
IMM GRANULOCYTES NFR BLD AUTO: 0.8 % (ref 0–0.5)
LYMPHOCYTES # BLD AUTO: 1.6 K/UL (ref 1–4.8)
LYMPHOCYTES NFR BLD: 16.5 % (ref 18–48)
MAGNESIUM SERPL-MCNC: 2 MG/DL (ref 1.6–2.6)
MCH RBC QN AUTO: 31.7 PG (ref 27–31)
MCHC RBC AUTO-ENTMCNC: 33.1 G/DL (ref 32–36)
MCV RBC AUTO: 96 FL (ref 82–98)
MONOCYTES # BLD AUTO: 1.1 K/UL (ref 0.3–1)
MONOCYTES NFR BLD: 11.1 % (ref 4–15)
NEUTROPHILS # BLD AUTO: 7 K/UL (ref 1.8–7.7)
NEUTROPHILS NFR BLD: 71 % (ref 38–73)
NRBC BLD-RTO: 0 /100 WBC
PHOSPHATE SERPL-MCNC: 1.6 MG/DL (ref 2.7–4.5)
PLATELET # BLD AUTO: 84 K/UL (ref 150–350)
PMV BLD AUTO: 10.7 FL (ref 9.2–12.9)
POTASSIUM SERPL-SCNC: 4.4 MMOL/L (ref 3.5–5.1)
RBC # BLD AUTO: 2.87 M/UL (ref 4–5.4)
SODIUM SERPL-SCNC: 146 MMOL/L (ref 136–145)
WBC # BLD AUTO: 9.9 K/UL (ref 3.9–12.7)

## 2019-09-08 PROCEDURE — 85025 COMPLETE CBC W/AUTO DIFF WBC: CPT

## 2019-09-08 PROCEDURE — 25000003 PHARM REV CODE 250: Performed by: INTERNAL MEDICINE

## 2019-09-08 PROCEDURE — 63600175 PHARM REV CODE 636 W HCPCS: Performed by: THORACIC SURGERY (CARDIOTHORACIC VASCULAR SURGERY)

## 2019-09-08 PROCEDURE — 84100 ASSAY OF PHOSPHORUS: CPT

## 2019-09-08 PROCEDURE — 25000003 PHARM REV CODE 250

## 2019-09-08 PROCEDURE — 93005 ELECTROCARDIOGRAM TRACING: CPT

## 2019-09-08 PROCEDURE — 20000000 HC ICU ROOM

## 2019-09-08 PROCEDURE — 25000003 PHARM REV CODE 250: Performed by: THORACIC SURGERY (CARDIOTHORACIC VASCULAR SURGERY)

## 2019-09-08 PROCEDURE — 21000000 HC CCU ICU ROOM CHARGE

## 2019-09-08 PROCEDURE — 83735 ASSAY OF MAGNESIUM: CPT

## 2019-09-08 PROCEDURE — 80048 BASIC METABOLIC PNL TOTAL CA: CPT

## 2019-09-08 PROCEDURE — C9113 INJ PANTOPRAZOLE SODIUM, VIA: HCPCS | Performed by: THORACIC SURGERY (CARDIOTHORACIC VASCULAR SURGERY)

## 2019-09-08 RX ORDER — METOPROLOL SUCCINATE 50 MG/1
50 TABLET, EXTENDED RELEASE ORAL 2 TIMES DAILY
Status: DISCONTINUED | OUTPATIENT
Start: 2019-09-08 | End: 2019-09-10 | Stop reason: HOSPADM

## 2019-09-08 RX ADMIN — METOPROLOL SUCCINATE 25 MG: 25 TABLET, FILM COATED, EXTENDED RELEASE ORAL at 08:09

## 2019-09-08 RX ADMIN — PANTOPRAZOLE SODIUM 40 MG: 40 INJECTION, POWDER, LYOPHILIZED, FOR SOLUTION INTRAVENOUS at 08:09

## 2019-09-08 RX ADMIN — MORPHINE SULFATE 2 MG: 2 INJECTION, SOLUTION INTRAMUSCULAR; INTRAVENOUS at 09:09

## 2019-09-08 RX ADMIN — KETOROLAC TROMETHAMINE 30 MG: 30 INJECTION, SOLUTION INTRAMUSCULAR at 05:09

## 2019-09-08 RX ADMIN — CHLORHEXIDINE GLUCONATE 15 ML: 1.2 RINSE ORAL at 08:09

## 2019-09-08 RX ADMIN — METOPROLOL SUCCINATE 50 MG: 50 TABLET, FILM COATED, EXTENDED RELEASE ORAL at 09:09

## 2019-09-08 RX ADMIN — KETOROLAC TROMETHAMINE 30 MG: 30 INJECTION, SOLUTION INTRAMUSCULAR at 11:09

## 2019-09-08 RX ADMIN — MORPHINE SULFATE 2 MG: 2 INJECTION, SOLUTION INTRAMUSCULAR; INTRAVENOUS at 03:09

## 2019-09-08 RX ADMIN — ASPIRIN 81 MG: 81 TABLET, COATED ORAL at 08:09

## 2019-09-08 RX ADMIN — DOCUSATE SODIUM 100 MG: 100 CAPSULE, LIQUID FILLED ORAL at 08:09

## 2019-09-08 RX ADMIN — MUPIROCIN 1 G: 20 OINTMENT TOPICAL at 09:09

## 2019-09-08 RX ADMIN — MUPIROCIN 1 G: 20 OINTMENT TOPICAL at 08:09

## 2019-09-08 RX ADMIN — KETOROLAC TROMETHAMINE 30 MG: 30 INJECTION, SOLUTION INTRAMUSCULAR at 02:09

## 2019-09-08 RX ADMIN — DOCUSATE SODIUM 100 MG: 100 CAPSULE, LIQUID FILLED ORAL at 09:09

## 2019-09-08 RX ADMIN — ATORVASTATIN CALCIUM 10 MG: 10 TABLET, FILM COATED ORAL at 08:09

## 2019-09-08 RX ADMIN — CHLORHEXIDINE GLUCONATE 15 ML: 1.2 RINSE ORAL at 09:09

## 2019-09-08 RX ADMIN — KETOROLAC TROMETHAMINE 30 MG: 30 INJECTION, SOLUTION INTRAMUSCULAR at 09:09

## 2019-09-08 NOTE — PROGRESS NOTES
[facility]  Cardiology  Progress Note    Patient Name: Nancy Muñoz  MRN: [id number]  Admission Date: 9/5/2019  Hospital Length of Stay: 3 days  Code Status: Full Code   Attending Physician: Freddy Julien MD   Primary Care Physician: Jan Petersen MD  Expected Discharge Date:   Principal Problem:Atherosclerosis of native coronary artery of native heart    Subjective:     Hospital Course: s/p CABG x 3v    Interval History: No new events overnight    Review of Systems   Constitution: Negative for malaise/fatigue.   Cardiovascular: Negative for chest pain and irregular heartbeat.   Respiratory: Negative for shortness of breath and wheezing.      Objective:     Vital Signs (Most Recent):  Temp: 98.1 °F (36.7 °C) (09/08/19 0800)  Pulse: 93 (09/08/19 0833)  Resp: 16 (09/08/19 0800)  BP: 106/60 (09/08/19 0833)  SpO2: 98 % (09/08/19 0800) Vital Signs (24h Range):  Temp:  [98 °F (36.7 °C)-99.2 °F (37.3 °C)] 98.1 °F (36.7 °C)  Pulse:  [] 93  Resp:  [14-33] 16  SpO2:  [93 %-99 %] 98 %  BP: ()/(50-61) 106/60     Weight: 70 kg (154 lb 5.2 oz)  Body mass index is 26.91 kg/m².    SpO2: 98 %  O2 Device (Oxygen Therapy): nasal cannula      Intake/Output Summary (Last 24 hours) at 9/8/2019 0906  Last data filed at 9/8/2019 0601  Gross per 24 hour   Intake 1580 ml   Output 1305 ml   Net 275 ml       Lines/Drains/Airways     Central Venous Catheter Line                 Percutaneous Central Line Insertion/Assessment - triple lumen  09/05/19 1049 2 days          Drain                 Chest Tube 09/05/19 1500 1 Pleural 2 days         Y Chest Tube 1 and 2 09/05/19 1500 Mediastinal Mediastinal 2 days          Line                 Pacer Wires 09/05/19 1500 2 days          Peripheral Intravenous Line                 Peripheral IV - Single Lumen 09/05/19 0918 20 G Right Wrist 2 days         Peripheral IV - Single Lumen 09/05/19 0924 18 G Left Forearm 2 days                Physical Exam   Constitutional: She  is oriented to person, place, and time. No distress.   Cardiovascular: Normal rate, regular rhythm, normal heart sounds and intact distal pulses.   Pulmonary/Chest: Effort normal and breath sounds normal.   Abdominal: Soft. Bowel sounds are normal.   Neurological: She is alert and oriented to person, place, and time.   Skin: She is not diaphoretic.   Nursing note and vitals reviewed.      Significant Labs:   CMP   Recent Labs   Lab 09/07/19 0442 09/08/19  0345    146*   K 4.9 4.4   * 113*   CO2 27 29   * 110   BUN 36* 39*   CREATININE 0.7 0.7   CALCIUM 9.7 9.3   ANIONGAP 4* 4*   ESTGFRAFRICA >60.0 >60.0   EGFRNONAA >60.0 >60.0   , CBC   Recent Labs   Lab 09/07/19 0442 09/08/19  0345   WBC 13.22* 9.90   HGB 7.6* 9.1*   HCT 22.6* 27.5*   PLT 95* 84*    and Lipid Panel No results for input(s): CHOL, HDL, LDLCALC, TRIG, CHOLHDL in the last 48 hours.    Significant Imaging: Telemetry: sinus  Assessment and Plan:     Brief HPI: 62 yo c multivessel cad. S/p cabg. Post op anemia, receiving PRBCs.    1. CAD: multivessel CAD. S/p CABG x 3V  2. HTN: -> relative hypotension. Monitor. Will try to make toprol 50 bid.  3. HLP: on statin  4. Post op anemia: s/p PRBCs        Active Diagnoses:    Diagnosis Date Noted POA    PRINCIPAL PROBLEM:  Atherosclerosis of native coronary artery of native heart [I25.10] 09/05/2019 Unknown    CAD (coronary artery disease) [I25.10] 09/05/2019 Yes      Problems Resolved During this Admission:       VTE Risk Mitigation (From admission, onward)    None          Sean Pham MD  Cardiology  [facility]

## 2019-09-08 NOTE — PLAN OF CARE
Problem: Adult Inpatient Plan of Care  Goal: Plan of Care Review  Outcome: Ongoing (interventions implemented as appropriate)     09/07/19 0944   Plan of Care Review   Plan of Care Reviewed With patient   Progress improving

## 2019-09-08 NOTE — CARE UPDATE
09/07/19 2130   Patient Assessment/Suction   Level of Consciousness (AVPU) alert   PRE-TX-O2   O2 Device (Oxygen Therapy) nasal cannula   $ Is the patient on Low Flow Oxygen? Yes   Flow (L/min) 1   SpO2 97 %   Pulse Oximetry Type Continuous   $ Pulse Oximetry - Multiple Charge Pulse Oximetry - Multiple   Pulse 88   Resp (!) 22

## 2019-09-08 NOTE — PROGRESS NOTES
Progress Note  Cardiothoracic Surgery    CHIEF COMPLAINT:  CORONARY ARTERY DISEASE    Admit Date: 9/5/2019  Post-operative Day: 3 Days Post-Op  Hospital Day: 4    SUBJECTIVE:  No complaint     Follow-up For: Procedure(s) (LRB):  CABG w/ EVH (N/A)  HARVEST-VEIN-ENDOVASCULAR (N/A)    Scheduled Meds:   aspirin  81 mg Oral Daily    atorvastatin  10 mg Oral Daily    chlorhexidine  15 mL Mouth/Throat BID    docusate sodium  100 mg Oral BID    metoprolol succinate  50 mg Oral BID    mupirocin  1 g Nasal BID    pantoprazole  40 mg Intravenous Daily     Infusions/Drips:   sodium chloride 0.45% 50 mL/hr at 09/06/19 0600    clevidipine       PRN Meds: sodium chloride, calcium gluconate IVPB, calcium gluconate IVPB, calcium gluconate IVPB, dextrose 50%, dextrose 50%, HYDROcodone-acetaminophen, ketorolac, magnesium sulfate IVPB, magnesium sulfate IVPB, morphine, morphine, ondansetron, potassium chloride in water **AND** potassium chloride in water, sodium phosphate IVPB, sodium phosphate IVPB, sodium phosphate IVPB    Review of patient's allergies indicates:   Allergen Reactions    Pineapple Anaphylaxis       OBJECTIVE:     Vital Signs (Most Recent)  Temp: 98.1 °F (36.7 °C) (09/08/19 0800)  Pulse: 94 (09/08/19 0900)  Resp: 19 (09/08/19 0900)  BP: (!) 99/59 (09/08/19 0900)  SpO2: 98 % (09/08/19 0900)    Admission Weight: 69.7 kg (153 lb 10.6 oz) (09/05/19 0853)   Most Recent Weight: 70 kg (154 lb 5.2 oz) (09/05/19 1600)    Vital Signs Range (Last 24H):  Temp:  [98 °F (36.7 °C)-99.2 °F (37.3 °C)]   Pulse:  []   Resp:  [14-33]   BP: ()/(50-61)   SpO2:  [93 %-99 %]     I & O (Last 24H):    Intake/Output Summary (Last 24 hours) at 9/8/2019 1043  Last data filed at 9/8/2019 0601  Gross per 24 hour   Intake 1580 ml   Output 1305 ml   Net 275 ml     Physical Exam:  Awake and alert  Heart regular rate and rhythm  Sternum is stable    Wound/Incision:  clean, dry, intact    Laboratory:  CBC:   Recent Labs   Lab  09/08/19  0345   WBC 9.90   RBC 2.87*   HGB 9.1*   HCT 27.5*   PLT 84*   MCV 96   MCH 31.7*   MCHC 33.1     BMP:   Recent Labs   Lab 09/08/19  0345      *   K 4.4   *   CO2 29   BUN 39*   CREATININE 0.7   CALCIUM 9.3   MG 2.0       Diagnostic Results:  X-Ray: Reviewed    ASSESSMENT/PLAN:     Assessment: Doing well, expected acute post-operative blood loss anemia.    Plan: MSTRs and LT removed. Increase BB. Ambulate.

## 2019-09-09 LAB
ALBUMIN SERPL BCP-MCNC: 2.7 G/DL (ref 3.5–5.2)
ALP SERPL-CCNC: 46 U/L (ref 55–135)
ALT SERPL W/O P-5'-P-CCNC: 21 U/L (ref 10–44)
ANION GAP SERPL CALC-SCNC: 6 MMOL/L (ref 8–16)
AST SERPL-CCNC: 20 U/L (ref 10–40)
BASOPHILS # BLD AUTO: 0.02 K/UL (ref 0–0.2)
BASOPHILS NFR BLD: 0.3 % (ref 0–1.9)
BILIRUB SERPL-MCNC: 1.6 MG/DL (ref 0.1–1)
BLD PROD TYP BPU: NORMAL
BLOOD UNIT EXPIRATION DATE: NORMAL
BLOOD UNIT TYPE CODE: 5100
BLOOD UNIT TYPE: NORMAL
BUN SERPL-MCNC: 32 MG/DL (ref 8–23)
CALCIUM SERPL-MCNC: 9.1 MG/DL (ref 8.7–10.5)
CHLORIDE SERPL-SCNC: 108 MMOL/L (ref 95–110)
CO2 SERPL-SCNC: 29 MMOL/L (ref 23–29)
CODING SYSTEM: NORMAL
CREAT SERPL-MCNC: 0.7 MG/DL (ref 0.5–1.4)
DIFFERENTIAL METHOD: ABNORMAL
DISPENSE STATUS: NORMAL
EOSINOPHIL # BLD AUTO: 0.2 K/UL (ref 0–0.5)
EOSINOPHIL NFR BLD: 2.9 % (ref 0–8)
ERYTHROCYTE [DISTWIDTH] IN BLOOD BY AUTOMATED COUNT: 12.9 % (ref 11.5–14.5)
EST. GFR  (AFRICAN AMERICAN): >60 ML/MIN/1.73 M^2
EST. GFR  (NON AFRICAN AMERICAN): >60 ML/MIN/1.73 M^2
GLUCOSE SERPL-MCNC: 103 MG/DL (ref 70–110)
HCT VFR BLD AUTO: 26.6 % (ref 37–48.5)
HGB BLD-MCNC: 8.9 G/DL (ref 12–16)
IMM GRANULOCYTES # BLD AUTO: 0.07 K/UL (ref 0–0.04)
IMM GRANULOCYTES NFR BLD AUTO: 0.9 % (ref 0–0.5)
LYMPHOCYTES # BLD AUTO: 1.9 K/UL (ref 1–4.8)
LYMPHOCYTES NFR BLD: 23.8 % (ref 18–48)
MCH RBC QN AUTO: 32.1 PG (ref 27–31)
MCHC RBC AUTO-ENTMCNC: 33.5 G/DL (ref 32–36)
MCV RBC AUTO: 96 FL (ref 82–98)
MONOCYTES # BLD AUTO: 0.8 K/UL (ref 0.3–1)
MONOCYTES NFR BLD: 10.2 % (ref 4–15)
NEUTROPHILS # BLD AUTO: 4.9 K/UL (ref 1.8–7.7)
NEUTROPHILS NFR BLD: 61.9 % (ref 38–73)
NRBC BLD-RTO: 0 /100 WBC
NUM UNITS TRANS PACKED RBC: NORMAL
PLATELET # BLD AUTO: 105 K/UL (ref 150–350)
PMV BLD AUTO: 10.4 FL (ref 9.2–12.9)
POTASSIUM SERPL-SCNC: 3.7 MMOL/L (ref 3.5–5.1)
PROT SERPL-MCNC: 4.9 G/DL (ref 6–8.4)
RBC # BLD AUTO: 2.77 M/UL (ref 4–5.4)
SODIUM SERPL-SCNC: 143 MMOL/L (ref 136–145)
WBC # BLD AUTO: 7.97 K/UL (ref 3.9–12.7)

## 2019-09-09 PROCEDURE — 85025 COMPLETE CBC W/AUTO DIFF WBC: CPT

## 2019-09-09 PROCEDURE — 93797 PHYS/QHP OP CAR RHAB WO ECG: CPT

## 2019-09-09 PROCEDURE — 21000000 HC CCU ICU ROOM CHARGE

## 2019-09-09 PROCEDURE — 63600175 PHARM REV CODE 636 W HCPCS: Performed by: THORACIC SURGERY (CARDIOTHORACIC VASCULAR SURGERY)

## 2019-09-09 PROCEDURE — 94761 N-INVAS EAR/PLS OXIMETRY MLT: CPT

## 2019-09-09 PROCEDURE — 25000003 PHARM REV CODE 250: Performed by: PHYSICIAN ASSISTANT

## 2019-09-09 PROCEDURE — 25000003 PHARM REV CODE 250: Performed by: INTERNAL MEDICINE

## 2019-09-09 PROCEDURE — 25000003 PHARM REV CODE 250: Performed by: THORACIC SURGERY (CARDIOTHORACIC VASCULAR SURGERY)

## 2019-09-09 PROCEDURE — 80053 COMPREHEN METABOLIC PANEL: CPT

## 2019-09-09 PROCEDURE — 63600175 PHARM REV CODE 636 W HCPCS: Performed by: PHYSICIAN ASSISTANT

## 2019-09-09 PROCEDURE — C9113 INJ PANTOPRAZOLE SODIUM, VIA: HCPCS | Performed by: THORACIC SURGERY (CARDIOTHORACIC VASCULAR SURGERY)

## 2019-09-09 PROCEDURE — 25000003 PHARM REV CODE 250

## 2019-09-09 RX ADMIN — DOCUSATE SODIUM 100 MG: 100 CAPSULE, LIQUID FILLED ORAL at 08:09

## 2019-09-09 RX ADMIN — CHLORHEXIDINE GLUCONATE 15 ML: 1.2 RINSE ORAL at 08:09

## 2019-09-09 RX ADMIN — MORPHINE SULFATE 4 MG: 4 INJECTION INTRAVENOUS at 09:09

## 2019-09-09 RX ADMIN — METOPROLOL SUCCINATE 50 MG: 50 TABLET, FILM COATED, EXTENDED RELEASE ORAL at 08:09

## 2019-09-09 RX ADMIN — MUPIROCIN 1 G: 20 OINTMENT TOPICAL at 08:09

## 2019-09-09 RX ADMIN — KETOROLAC TROMETHAMINE 30 MG: 30 INJECTION, SOLUTION INTRAMUSCULAR at 08:09

## 2019-09-09 RX ADMIN — HYDROCODONE BITARTRATE AND ACETAMINOPHEN 1 TABLET: 5; 325 TABLET ORAL at 04:09

## 2019-09-09 RX ADMIN — ASPIRIN 81 MG: 81 TABLET, COATED ORAL at 08:09

## 2019-09-09 RX ADMIN — PANTOPRAZOLE SODIUM 40 MG: 40 INJECTION, POWDER, LYOPHILIZED, FOR SOLUTION INTRAVENOUS at 08:09

## 2019-09-09 RX ADMIN — ATORVASTATIN CALCIUM 10 MG: 10 TABLET, FILM COATED ORAL at 08:09

## 2019-09-09 RX ADMIN — HYDROCODONE BITARTRATE AND ACETAMINOPHEN 1 TABLET: 5; 325 TABLET ORAL at 08:09

## 2019-09-09 NOTE — PROGRESS NOTES
[facility]  Cardiology  Progress Note    Patient Name: Nancy Muñoz  MRN: [id number]  Admission Date: 9/5/2019  Hospital Length of Stay: 4 days  Code Status: Full Code   Attending Physician: Freddy Julien MD   Primary Care Physician: Jan Petersen MD  Expected Discharge Date:   Principal Problem:Atherosclerosis of native coronary artery of native heart    Subjective:     HPI: Seen and examined this AM. Denies any active cardiac complaints. Resting comfortable this AM. Ambulating without complications and using IS regularly. VSS.      Review of Systems   Constitution: Negative for malaise/fatigue.   Cardiovascular: Negative for chest pain and irregular heartbeat.   Respiratory: Negative for shortness of breath and wheezing.      Objective:     Vital Signs (Most Recent):  Temp: 98.1 °F (36.7 °C) (09/09/19 0301)  Pulse: 98 (09/09/19 0747)  Resp: 18 (09/09/19 0747)  BP: 121/67 (09/09/19 0601)  SpO2: 96 % (09/09/19 0747) Vital Signs (24h Range):  Temp:  [98.1 °F (36.7 °C)-98.9 °F (37.2 °C)] 98.1 °F (36.7 °C)  Pulse:  [] 98  Resp:  [16-29] 18  SpO2:  [92 %-99 %] 96 %  BP: ()/(53-74) 121/67     Weight: 70 kg (154 lb 5.2 oz)  Body mass index is 26.91 kg/m².    SpO2: 96 %  O2 Device (Oxygen Therapy): room air      Intake/Output Summary (Last 24 hours) at 9/9/2019 1001  Last data filed at 9/9/2019 0600  Gross per 24 hour   Intake 881 ml   Output 400 ml   Net 481 ml       Lines/Drains/Airways     Central Venous Catheter Line                 Percutaneous Central Line Insertion/Assessment - triple lumen  09/05/19 1049 3 days          Peripheral Intravenous Line                 Peripheral IV - Single Lumen 09/05/19 0918 20 G Right Wrist 4 days         Peripheral IV - Single Lumen 09/05/19 0924 18 G Left Forearm 4 days                Physical Exam   Constitutional: She is oriented to person, place, and time. No distress.   Cardiovascular: Normal rate, regular rhythm, normal heart sounds and  intact distal pulses.   Pulmonary/Chest: Effort normal and breath sounds normal.   Abdominal: Soft. Bowel sounds are normal.   Neurological: She is alert and oriented to person, place, and time.   Skin: She is not diaphoretic.   Nursing note and vitals reviewed.      Significant Labs:   CMP   Recent Labs   Lab 09/08/19 0345 09/09/19 0417   * 143   K 4.4 3.7   * 108   CO2 29 29    103   BUN 39* 32*   CREATININE 0.7 0.7   CALCIUM 9.3 9.1   PROT  --  4.9*   ALBUMIN  --  2.7*   BILITOT  --  1.6*   ALKPHOS  --  46*   AST  --  20   ALT  --  21   ANIONGAP 4* 6*   ESTGFRAFRICA >60.0 >60.0   EGFRNONAA >60.0 >60.0   , CBC   Recent Labs   Lab 09/08/19 0345 09/09/19 0417   WBC 9.90 7.97   HGB 9.1* 8.9*   HCT 27.5* 26.6*   PLT 84* 105*    and Lipid Panel No results for input(s): CHOL, HDL, LDLCALC, TRIG, CHOLHDL in the last 48 hours.      Assessment and Plan:     ASSESSMENT:  1. CAD: multivessel CAD. S/p CABG x 3V  2. HTN:   3. HLP  4. Post op anemia    PLAN:  Downgrade to Cards A bed but likely will remain in ICU due to unavailability of beds. Plan for D/C tomorrow.   Continue current medical management/ambulation/IS.       Active Diagnoses:    Diagnosis Date Noted POA    PRINCIPAL PROBLEM:  Atherosclerosis of native coronary artery of native heart [I25.10] 09/05/2019 Unknown    CAD (coronary artery disease) [I25.10] 09/05/2019 Yes      Problems Resolved During this Admission:       VTE Risk Mitigation (From admission, onward)    None          Jude Guerra PA-C  Cardiology  [facility]

## 2019-09-09 NOTE — PROGRESS NOTES
"[facility]  Adult Nutrition  Progress Note    SUMMARY       Recommendations    Recommendation/Intervention:   Continue current diet and encourage PO intake of meals and supplements.     Goals:   Patient to meet at least 75% of eatimated needs.     Nutrition Goal Status: progressing towards goal  Communication of RD Recs: reviewed with RN    Reason for Assessment    Reason For Assessment: RD follow-up    Anthropometrics    Temp: 98.8 °F (37.1 °C)  Height: 5' 3.5" (161.3 cm)  Height (inches): 63.5 in  Weight Method: Bed Scale  Weight: 70 kg (154 lb 5.2 oz)  Weight (lb): 154.32 lb  Ideal Body Weight (IBW), Female: 117.5 lb  % Ideal Body Weight, Female (lb): 131.34 lb  BMI (Calculated): 27  BMI Grade: 25 - 29.9 - overweight        Lab/Procedures/Meds    Pertinent Labs Reviewed: reviewed  CBC:  Recent Labs   Lab 09/09/19  0417   WBC 7.97   HGB 8.9*   HCT 26.6*   *     CMP:  Recent Labs   Lab 09/09/19 0417   CALCIUM 9.1   ALBUMIN 2.7*   PROT 4.9*      K 3.7   CO2 29      BUN 32*   CREATININE 0.7   ALKPHOS 46*   ALT 21   AST 20   BILITOT 1.6*     Pertinent Medications Reviewed: reviewed    Scheduled Meds:   aspirin  81 mg Oral Daily    atorvastatin  10 mg Oral Daily    chlorhexidine  15 mL Mouth/Throat BID    docusate sodium  100 mg Oral BID    metoprolol succinate  50 mg Oral BID    mupirocin  1 g Nasal BID    pantoprazole  40 mg Intravenous Daily     Continuous Infusions:   sodium chloride 0.45% 50 mL/hr at 09/06/19 0600    clevidipine       PRN Meds:.sodium chloride, calcium gluconate IVPB, calcium gluconate IVPB, calcium gluconate IVPB, dextrose 50%, dextrose 50%, HYDROcodone-acetaminophen, magnesium sulfate IVPB, magnesium sulfate IVPB, morphine, morphine, ondansetron, potassium chloride in water **AND** potassium chloride in water, sodium phosphate IVPB, sodium phosphate IVPB, sodium phosphate IVPB    Estimated/Assessed Needs    Weight Used For Calorie Calculations: 70 kg " (154 lb 5.2 oz)  Energy Calorie Requirements (kcal): 1750 - 2100 (25 - 30)   Energy Need Method: Kcal/kg  Protein Requirements: 84 - 105 (1.2 -1.5 g/kg)   Weight Used For Protein Calculations: 70 kg (154 lb 5.2 oz)     Estimated Fluid Requirement Method: RDA Method  RDA Method (mL): 1750       Nutrition Prescription Ordered    Current Diet Order: cardiac  Oral Nutrition Supplement: Ensure Enlive TID     Evaluation of Received Nutrient/Fluid Intake    Energy Calories Required: not meeting needs  Protein Required: meeting needs  Fluid Required: meeting needs  Comments: Patient expressed strong dislike of hospital foods taste. Pateint is eating foods she likes and is being offered alternatives. RD encouraged intake and offered and provided patient with multiple food options to choose from if she does not care for menu selections.   Tolerance: tolerating  % Intake of Estimated Energy Needs: 50 - 75 %  % Meal Intake: 50 - 75 %    Nutrition Risk    Level of Risk/Frequency of Follow-up: moderate     Monitor and Evaluation    Food and Nutrient Intake: energy intake, food and beverage intake  Food and Nutrient Adminstration: diet order  Knowledge/Beliefs/Attitudes: beliefs and attitudes, food and nutrition knowledge/skill  Physical Activity and Function: nutrition-related ADLs and IADLs  Anthropometric Measurements: weight change  Biochemical Data, Medical Tests and Procedures: electrolyte and renal panel, lipid profile, gastrointestinal profile, glucose/endocrine profile, inflammatory profile  Nutrition-Focused Physical Findings: overall appearance     Nutrition Follow-Up    RD Follow-up?: Yes     Sandrita Gamino RD 09/09/2019 6:02 PM

## 2019-09-09 NOTE — PROGRESS NOTES
Cardiac Rehab     Nancy Muñoz   [id number]   9/9/2019         Cardiac Rehab Phase Taught: Phase 1    Teaching Method: Verbal    Handouts: After Heart Surgery Booklet, Cardiac Diet Pack, Cardiac Rehab Tear Sheet, Discharge Instructions First Two Weeks Post-Op, Home Activity Sheet, Home Walking Program Sheet, Phase 2 Appointment Sheet and Post-op CABG Booklet    Educational Videos: None    Understanding:  Knowledge indicated by feedback, Learning indicated by feedback and Verbalize understanding    Comments: pt in bed, discharge cabg packet given for review. Pt ambulating ad abby with daughter    Total time spent: 15mins             Ina Rico RN

## 2019-09-09 NOTE — CARE UPDATE
09/08/19 9196   Patient Assessment/Suction   Level of Consciousness (AVPU) alert   PRE-TX-O2   O2 Device (Oxygen Therapy) room air   SpO2 96 %   Pulse Oximetry Type Continuous   Pulse 92   Resp (!) 24

## 2019-09-09 NOTE — PROGRESS NOTES
This patient is status post coronary artery bypass grafting.  She is doing well.  Her vital signs are stable.  Her chest tubes have been removed.  Her surgical wounds are clean and dry.  Mobilization is ongoing.  She should be able to be discharged in the next 1-2 days.

## 2019-09-09 NOTE — PROGRESS NOTES
Cardiac Rehab     Nancy Muñoz   [id number]   9/9/2019         Cardiac Rehab Phase Taught: Phase 1    Teaching Method: Verbal    Handouts: None    Educational Videos: None    Understanding:  Knowledge indicated by feedback, Learning indicated by feedback and Verbalize understanding    Comments: pt in bed, daughter at bedside. Ambulated in hallway x2, tolerated well. Review of sternal precautions, IS, daily activity.    Total time spent: 30mins            Ina Rico RN

## 2019-09-09 NOTE — PLAN OF CARE
Problem: Adult Inpatient Plan of Care  Goal: Plan of Care Review  Outcome: Ongoing (interventions implemented as appropriate)     09/09/19 9072   Plan of Care Review   Plan of Care Reviewed With patient   Progress improving

## 2019-09-10 VITALS
WEIGHT: 154.31 LBS | TEMPERATURE: 98 F | DIASTOLIC BLOOD PRESSURE: 58 MMHG | BODY MASS INDEX: 26.34 KG/M2 | OXYGEN SATURATION: 95 % | SYSTOLIC BLOOD PRESSURE: 102 MMHG | RESPIRATION RATE: 20 BRPM | HEIGHT: 64 IN | HEART RATE: 88 BPM

## 2019-09-10 PROCEDURE — 25000003 PHARM REV CODE 250: Performed by: PHYSICIAN ASSISTANT

## 2019-09-10 PROCEDURE — C9113 INJ PANTOPRAZOLE SODIUM, VIA: HCPCS | Performed by: PHYSICIAN ASSISTANT

## 2019-09-10 PROCEDURE — 63600175 PHARM REV CODE 636 W HCPCS: Performed by: PHYSICIAN ASSISTANT

## 2019-09-10 RX ADMIN — HYDROCODONE BITARTRATE AND ACETAMINOPHEN 1 TABLET: 5; 325 TABLET ORAL at 03:09

## 2019-09-10 RX ADMIN — ATORVASTATIN CALCIUM 10 MG: 10 TABLET, FILM COATED ORAL at 08:09

## 2019-09-10 RX ADMIN — HYDROCODONE BITARTRATE AND ACETAMINOPHEN 1 TABLET: 5; 325 TABLET ORAL at 08:09

## 2019-09-10 RX ADMIN — CHLORHEXIDINE GLUCONATE 15 ML: 1.2 RINSE ORAL at 08:09

## 2019-09-10 RX ADMIN — DOCUSATE SODIUM 100 MG: 100 CAPSULE, LIQUID FILLED ORAL at 08:09

## 2019-09-10 RX ADMIN — ASPIRIN 81 MG: 81 TABLET, COATED ORAL at 08:09

## 2019-09-10 RX ADMIN — MUPIROCIN 1 G: 20 OINTMENT TOPICAL at 08:09

## 2019-09-10 RX ADMIN — METOPROLOL SUCCINATE 50 MG: 50 TABLET, FILM COATED, EXTENDED RELEASE ORAL at 08:09

## 2019-09-10 RX ADMIN — PANTOPRAZOLE SODIUM 40 MG: 40 INJECTION, POWDER, LYOPHILIZED, FOR SOLUTION INTRAVENOUS at 08:09

## 2019-09-10 NOTE — PROGRESS NOTES
[facility]  Cardiology  Progress Note    Patient Name: Nancy Muñoz  MRN: [id number]  Admission Date: 9/5/2019  Hospital Length of Stay: 5 days  Code Status: Full Code   Attending Physician: Freddy Julien MD   Primary Care Physician: Jan Petersen MD  Expected Discharge Date:   Principal Problem:Atherosclerosis of native coronary artery of native heart    Subjective:     HPI:  No complaints.  Wants to go home.  No chest pain or shortness of breath.  Spoke with family at bedside.     Review of Systems   Constitution: Negative for malaise/fatigue.   Cardiovascular: Negative for chest pain and irregular heartbeat.   Respiratory: Negative for shortness of breath and wheezing.      Objective:     Vital Signs (Most Recent):  Temp: 98.4 °F (36.9 °C) (09/10/19 0756)  Pulse: 92 (09/10/19 0756)  Resp: (!) 23 (09/10/19 0756)  BP: (!) 111/59 (09/10/19 0756)  SpO2: 96 % (09/10/19 0756) Vital Signs (24h Range):  Temp:  [97.6 °F (36.4 °C)-98.8 °F (37.1 °C)] 98.4 °F (36.9 °C)  Pulse:  [85-96] 92  Resp:  [12-37] 23  SpO2:  [80 %-97 %] 96 %  BP: (106-128)/(55-70) 111/59     Weight: 70 kg (154 lb 5.2 oz)  Body mass index is 26.91 kg/m².    SpO2: 96 %  O2 Device (Oxygen Therapy): room air      Intake/Output Summary (Last 24 hours) at 9/10/2019 0942  Last data filed at 9/10/2019 0935  Gross per 24 hour   Intake 1240 ml   Output 800 ml   Net 440 ml       Lines/Drains/Airways     Peripheral Intravenous Line                 Peripheral IV - Single Lumen 09/05/19 0924 18 G Left Forearm 5 days                Physical Exam   Constitutional: She is oriented to person, place, and time. No distress.   Cardiovascular: Normal rate, regular rhythm, normal heart sounds and intact distal pulses.   Pulmonary/Chest: Effort normal and breath sounds normal.   Abdominal: Soft. Bowel sounds are normal.   Neurological: She is alert and oriented to person, place, and time.   Skin: She is not diaphoretic.   Nursing note and vitals  reviewed.      Significant Labs:   CMP   Recent Labs   Lab 09/09/19  0417      K 3.7      CO2 29      BUN 32*   CREATININE 0.7   CALCIUM 9.1   PROT 4.9*   ALBUMIN 2.7*   BILITOT 1.6*   ALKPHOS 46*   AST 20   ALT 21   ANIONGAP 6*   ESTGFRAFRICA >60.0   EGFRNONAA >60.0   , CBC   Recent Labs   Lab 09/09/19  0417   WBC 7.97   HGB 8.9*   HCT 26.6*   *    and Lipid Panel No results for input(s): CHOL, HDL, LDLCALC, TRIG, CHOLHDL in the last 48 hours.      Assessment and Plan:     ASSESSMENT:  1. CAD: multivessel CAD. S/p CABG x 3V  2. HTN:   3. HLP  4. Expected blood loss Post op anemia    PLAN:    Ok for d/c from cardiac stand point  F/u 2 weeks    Active Diagnoses:    Diagnosis Date Noted POA    PRINCIPAL PROBLEM:  Atherosclerosis of native coronary artery of native heart [I25.10] 09/05/2019 Unknown    CAD (coronary artery disease) [I25.10] 09/05/2019 Yes      Problems Resolved During this Admission:       VTE Risk Mitigation (From admission, onward)    None          JAIDEN Olmstead  Cardiology  [facility]

## 2019-09-10 NOTE — CARE UPDATE
This note also relates to the following rows which could not be included:  Pulse - Cannot attach notes to unvalidated device data  Resp - Cannot attach notes to unvalidated device data       09/09/19 8865   Patient Assessment/Suction   Level of Consciousness (AVPU) alert   Respiratory Effort Normal;Unlabored   Expansion/Accessory Muscles/Retractions expansion symmetric   LLL Breath Sounds diminished   RLL Breath Sounds diminished   Rhythm/Pattern, Respiratory pattern regular;unlabored   Cough Type good;nonproductive   PRE-TX-O2   O2 Device (Oxygen Therapy) room air   SpO2 96 %   Pulse Oximetry Type Continuous   $ Pulse Oximetry - Multiple Charge Pulse Oximetry - Multiple

## 2019-09-10 NOTE — DISCHARGE SUMMARY
This patient was brought in for elective coronary artery bypass grafting.  She had routine angiography which showed multivessel coronary artery disease.  She will was referred for surgical bypass.  Her past history is well documented in the epic record.  Her physical exam was largely unremarkable for the present illness.  She underwent the procedure as described.  Her postoperative course was largely unremarkable.  She had no major complications.  Her surgical wounds were clean and dry.  Her laboratory work was acceptable.  She was ambulatory and tolerating a diet.  Her medicines were largely unchanged and I wrote a prescription for Percocet to be taken as needed for pain.  She should take daily aspirin.  She should plan to see me in 2 weeks.  Her condition satisfactory.

## 2019-09-11 ENCOUNTER — TELEPHONE (OUTPATIENT)
Dept: CARDIAC REHAB | Facility: HOSPITAL | Age: 64
End: 2019-09-11

## 2019-09-11 NOTE — TELEPHONE ENCOUNTER
Nancy Muñoz   [id number]   9/11/2019         Spoke with: Nancy Muñoz    Received Medications?:yes    Follow Up Appt?:yes    Cardio Pulmonary Rehab Appt?:yes    Comments: Cardiac rehab orientation appt scheduled on September 19 @ 4129    Minnie Glez RN

## 2019-09-12 ENCOUNTER — TELEPHONE (OUTPATIENT)
Dept: VASCULAR SURGERY | Facility: CLINIC | Age: 64
End: 2019-09-12

## 2019-09-12 NOTE — TELEPHONE ENCOUNTER
----- Message from Phil Gayle sent at 9/12/2019  8:32 AM CDT -----  Contact: Patient  Type: Needs Medical Advice    Who Called:  Patient  Best Call Back Number: [phone]  Additional Information: Patient would like to schedule 2-week post-op on 9/26, but they are unable to make the times available. Patient would also like to discuss paperwork for work as their work has not received for it for 2 weeks. Please call to advise. Thanks!

## 2019-09-17 ENCOUNTER — TELEPHONE (OUTPATIENT)
Dept: UROLOGY | Facility: CLINIC | Age: 64
End: 2019-09-17

## 2019-09-18 ENCOUNTER — OFFICE VISIT (OUTPATIENT)
Dept: VASCULAR SURGERY | Facility: CLINIC | Age: 64
End: 2019-09-18
Payer: COMMERCIAL

## 2019-09-18 VITALS
SYSTOLIC BLOOD PRESSURE: 104 MMHG | HEIGHT: 64 IN | WEIGHT: 150 LBS | DIASTOLIC BLOOD PRESSURE: 66 MMHG | HEART RATE: 96 BPM | BODY MASS INDEX: 25.61 KG/M2

## 2019-09-18 DIAGNOSIS — Z95.1 S/P CABG (CORONARY ARTERY BYPASS GRAFT): Primary | ICD-10-CM

## 2019-09-18 PROCEDURE — 99024 PR POST-OP FOLLOW-UP VISIT: ICD-10-PCS | Mod: S$GLB,,, | Performed by: THORACIC SURGERY (CARDIOTHORACIC VASCULAR SURGERY)

## 2019-09-18 PROCEDURE — 99024 POSTOP FOLLOW-UP VISIT: CPT | Mod: S$GLB,,, | Performed by: THORACIC SURGERY (CARDIOTHORACIC VASCULAR SURGERY)

## 2019-09-18 PROCEDURE — 99999 PR PBB SHADOW E&M-EST. PATIENT-LVL III: CPT | Mod: PBBFAC,,, | Performed by: THORACIC SURGERY (CARDIOTHORACIC VASCULAR SURGERY)

## 2019-09-18 PROCEDURE — 99999 PR PBB SHADOW E&M-EST. PATIENT-LVL III: ICD-10-PCS | Mod: PBBFAC,,, | Performed by: THORACIC SURGERY (CARDIOTHORACIC VASCULAR SURGERY)

## 2019-09-18 RX ORDER — METOPROLOL SUCCINATE 25 MG/1
25 TABLET, EXTENDED RELEASE ORAL DAILY
Qty: 30 TABLET | Refills: 0
Start: 2019-09-18 | End: 2020-01-28

## 2019-09-18 RX ORDER — OXYCODONE AND ACETAMINOPHEN 10; 325 MG/1; MG/1
1 TABLET ORAL EVERY 4 HOURS PRN
COMMUNITY
End: 2019-09-26

## 2019-09-18 NOTE — PROGRESS NOTES
This patient is status post coronary artery bypass grafting.  She comes back to the office today in follow-up.  She is doing well overall.  She has no major complaints.  She has the expected soreness related to the sternotomy incision.  Her medicines and problems were reviewed.  On physical exam vital signs are stable.  Her surgical wounds are clean and dry.  Her chest tube stitches were removed.  At this point the patient is doing well status post coronary artery bypass grafting.  She is scheduled to begin cardiac rehabilitation.  She will also make an appointment to see a cardiologist in the near future.  She can see me on an as-needed basis.  But hopefully she will continue to do well.

## 2019-09-19 ENCOUNTER — CLINICAL SUPPORT (OUTPATIENT)
Dept: CARDIAC REHAB | Facility: HOSPITAL | Age: 64
End: 2019-09-19
Payer: COMMERCIAL

## 2019-09-19 DIAGNOSIS — Z95.1 S/P CABG (CORONARY ARTERY BYPASS GRAFT): Primary | ICD-10-CM

## 2019-09-19 PROCEDURE — 93797 PHYS/QHP OP CAR RHAB WO ECG: CPT

## 2019-09-19 PROCEDURE — 93798 PHYS/QHP OP CAR RHAB W/ECG: CPT

## 2019-09-26 ENCOUNTER — APPOINTMENT (OUTPATIENT)
Dept: LAB | Facility: HOSPITAL | Age: 64
End: 2019-09-26
Attending: UROLOGY
Payer: COMMERCIAL

## 2019-09-26 ENCOUNTER — OFFICE VISIT (OUTPATIENT)
Dept: UROLOGY | Facility: CLINIC | Age: 64
End: 2019-09-26
Payer: COMMERCIAL

## 2019-09-26 VITALS
SYSTOLIC BLOOD PRESSURE: 105 MMHG | HEART RATE: 83 BPM | HEIGHT: 63 IN | BODY MASS INDEX: 26.98 KG/M2 | DIASTOLIC BLOOD PRESSURE: 68 MMHG | WEIGHT: 152.25 LBS

## 2019-09-26 DIAGNOSIS — N20.0 KIDNEY STONE: Primary | ICD-10-CM

## 2019-09-26 LAB
BACTERIA #/AREA URNS HPF: ABNORMAL /HPF
BILIRUB SERPL-MCNC: ABNORMAL MG/DL
BILIRUB UR QL STRIP: NEGATIVE
BLOOD URINE, POC: ABNORMAL
CLARITY UR: CLEAR
COLOR UR: YELLOW
COLOR, POC UA: YELLOW
GLUCOSE UR QL STRIP: ABNORMAL
GLUCOSE UR QL STRIP: NEGATIVE
HGB UR QL STRIP: ABNORMAL
KETONES UR QL STRIP: ABNORMAL
KETONES UR QL STRIP: NEGATIVE
LEUKOCYTE ESTERASE UR QL STRIP: ABNORMAL
LEUKOCYTE ESTERASE URINE, POC: ABNORMAL
MICROSCOPIC COMMENT: ABNORMAL
NITRITE UR QL STRIP: NEGATIVE
NITRITE, POC UA: ABNORMAL
PH UR STRIP: 8 [PH] (ref 5–8)
PH, POC UA: 6
PROT UR QL STRIP: NEGATIVE
PROTEIN, POC: 30
RBC #/AREA URNS HPF: 4 /HPF (ref 0–4)
SP GR UR STRIP: 1.02 (ref 1–1.03)
SPECIFIC GRAVITY, POC UA: 1.02
SQUAMOUS #/AREA URNS HPF: 1 /HPF
URN SPEC COLLECT METH UR: ABNORMAL
UROBILINOGEN UR STRIP-ACNC: NEGATIVE EU/DL
UROBILINOGEN, POC UA: ABNORMAL
WBC #/AREA URNS HPF: 60 /HPF (ref 0–5)

## 2019-09-26 PROCEDURE — 3074F SYST BP LT 130 MM HG: CPT | Mod: CPTII,S$GLB,, | Performed by: UROLOGY

## 2019-09-26 PROCEDURE — 99999 PR PBB SHADOW E&M-EST. PATIENT-LVL III: ICD-10-PCS | Mod: PBBFAC,,, | Performed by: UROLOGY

## 2019-09-26 PROCEDURE — 81000 URINALYSIS NONAUTO W/SCOPE: CPT

## 2019-09-26 PROCEDURE — 3008F PR BODY MASS INDEX (BMI) DOCUMENTED: ICD-10-PCS | Mod: CPTII,S$GLB,, | Performed by: UROLOGY

## 2019-09-26 PROCEDURE — 87086 URINE CULTURE/COLONY COUNT: CPT

## 2019-09-26 PROCEDURE — 51701 PR INSERTION OF NON-INDWELLING BLADDER CATHETERIZATION FOR RESIDUAL UR: ICD-10-PCS | Mod: S$GLB,,, | Performed by: UROLOGY

## 2019-09-26 PROCEDURE — 51701 INSERT BLADDER CATHETER: CPT | Mod: S$GLB,,, | Performed by: UROLOGY

## 2019-09-26 PROCEDURE — 99205 PR OFFICE/OUTPT VISIT, NEW, LEVL V, 60-74 MIN: ICD-10-PCS | Mod: 25,S$GLB,, | Performed by: UROLOGY

## 2019-09-26 PROCEDURE — 81002 URINALYSIS NONAUTO W/O SCOPE: CPT | Mod: S$GLB,,, | Performed by: UROLOGY

## 2019-09-26 PROCEDURE — 99999 PR PBB SHADOW E&M-EST. PATIENT-LVL III: CPT | Mod: PBBFAC,,, | Performed by: UROLOGY

## 2019-09-26 PROCEDURE — 3078F PR MOST RECENT DIASTOLIC BLOOD PRESSURE < 80 MM HG: ICD-10-PCS | Mod: CPTII,S$GLB,, | Performed by: UROLOGY

## 2019-09-26 PROCEDURE — 3078F DIAST BP <80 MM HG: CPT | Mod: CPTII,S$GLB,, | Performed by: UROLOGY

## 2019-09-26 PROCEDURE — 87088 URINE BACTERIA CULTURE: CPT

## 2019-09-26 PROCEDURE — 87077 CULTURE AEROBIC IDENTIFY: CPT

## 2019-09-26 PROCEDURE — 81002 POCT URINE DIPSTICK WITHOUT MICROSCOPE: ICD-10-PCS | Mod: S$GLB,,, | Performed by: UROLOGY

## 2019-09-26 PROCEDURE — 3074F PR MOST RECENT SYSTOLIC BLOOD PRESSURE < 130 MM HG: ICD-10-PCS | Mod: CPTII,S$GLB,, | Performed by: UROLOGY

## 2019-09-26 PROCEDURE — 87186 SC STD MICRODIL/AGAR DIL: CPT

## 2019-09-26 PROCEDURE — 99205 OFFICE O/P NEW HI 60 MIN: CPT | Mod: 25,S$GLB,, | Performed by: UROLOGY

## 2019-09-26 PROCEDURE — 3008F BODY MASS INDEX DOCD: CPT | Mod: CPTII,S$GLB,, | Performed by: UROLOGY

## 2019-09-26 RX ORDER — CIPROFLOXACIN 500 MG/1
500 TABLET ORAL 2 TIMES DAILY
Qty: 14 TABLET | Refills: 0 | Status: SHIPPED | OUTPATIENT
Start: 2019-09-26 | End: 2019-10-03

## 2019-09-26 RX ORDER — TAMSULOSIN HYDROCHLORIDE 0.4 MG/1
0.4 CAPSULE ORAL
Qty: 30 CAPSULE | Refills: 0 | Status: ON HOLD | OUTPATIENT
Start: 2019-09-26 | End: 2019-10-16 | Stop reason: SDUPTHER

## 2019-09-26 NOTE — PROGRESS NOTES
[facility] Note - Ye[place]  Staff: MD Nando    Referring provider and please cc:   Niharika Collier MD  [address] [place] [address] [address]  [address]  [place] [address]     PCP: Jan Petersen MD    [facility] Utilization: active    Chief Complaint: No chief complaint on file.        Subjective:        HPI: Nancy Muñoz is a 63 y.o. female     -h/o ureteral R requiring extraction around 2011. None since. No family hx of stones.   -She went to [facility] for CP and was found to have MI, sent to [facility] for angiogram by , unsuccessful and then underwent a CABG by  around 8/5, currently ASA 81mg. Then developed Gh w/o UTI sx. Never had GH before.  Found to have proteus UTI, ct showed  Large 1.5cm R lower pole stone and 3mm LLP stone.  Denies any flank pain. Cytology 8/28/19 was neg. (+smoker but quit)  -h/o 2 back surgeries last year and 3rd bad disc.   -already on oxycodone 10/325mg written by Dr.Jonathan Schmitz (has a pain contract, last saw him 2 months ago-sees Oct 3rd) takes 3 a day for back pain    Urine history  Urinalysis void: large leuk/mod bld/30 prot- no sx uti  ua cath: sent for ua and culture, pvr by in and out cath: 100cc cloudy  Urine history:   9/4/19  No cx, void: 1+leuk, 19 wbc/8 rbc  8/28/19 P.mirabilis, void: 2+bld/2+leuk, 15 wbc, >100 rbc, cath: 3+ bld/3+leuk  2/23/18 Ng, void: 1+leuk, 38 wbc/2 rbc      REVIEW OF SYSTEMS:  General ROS: no fevers, no chills  Psychological ROS: no depression  Endocrine ROS: no heat or cold  Respiratory ROS:+ SOB since MI  Cardiovascular ROS: no CP  Gastrointestinal ROS: no abdominal pain, no constipation, no diarrhea, noBRBPR  Musculoskeletal ROS: no muscle pain  Neurological ROS: no headaches  Dermatological ROS: no rashes  HEENT: noglasses, no sinus   ROS: per HPI     PMHx:  Past Medical History:   Diagnosis Date    Allergy     Arthritis     spine-ddd    Asthma     usu allergy related    Coronary artery disease  08/2019    cabg scheduled    Diverticulosis     High calcium levels 2015    Hypercholesteremia 2017    Hypertension     dr plunkett & dr pham    Hyperthyroidism     ???    Indigestion 08/2019    slight problem yrs ago    Lumbar radiculitis 1/30/2018    MI (myocardial infarction) 08/22/2019    angio done- cabg scheduled    NSTEMI (non-ST elevated myocardial infarction) 8/25/2019    Pain management 08/2019    dr mays ([place])    Renal stone 08/2019    to f/u with urology post op    Renal stone 2015    stone removed    Staph infection 2005    treated       PSHx:  Past Surgical History:   Procedure Laterality Date    ANGIOGRAM, CORONARY, WITH LEFT HEART CATHETERIZATION  8/27/2019    Procedure: Angiogram, Coronary, with Left Heart Cath;  Surgeon: Sean Pham MD;  Location: [facility] CATH/EP LAB;  Service: Cardiology;;    APPENDECTOMY      COLONOSCOPY      x 2    CORONARY ANGIOGRAPHY Left 8/27/2019    Procedure: ANGIOGRAM, CORONARY ARTERY;  Surgeon: Sean Pham MD;  Location: [facility] CATH/EP LAB;  Service: Cardiology;  Laterality: Left;    CORONARY ARTERY BYPASS GRAFT (CABG) N/A 9/5/2019    Procedure: CABG w/ EVH;  Surgeon: Freddy Julien MD;  Location: [facility] OR;  Service: Cardiothoracic;  Laterality: N/A;    ENDOSCOPIC HARVEST OF VEIN N/A 9/5/2019    Procedure: HARVEST-VEIN-ENDOVASCULAR;  Surgeon: Freddy Julien MD;  Location: [facility] OR;  Service: Cardiothoracic;  Laterality: N/A;    SPINE SURGERY      TONSILLECTOMY         Stents/Valves/Foreign Bodies/Cardiac Evaluation/Cardiologist: /  GI: , last colonoscopy:18 polyps, 2017 - was told to come back yearly but hasn't been able to go back    No family history on file.   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 No wheezing.   Cardiovascular: No obvious extremity edema. Warm and well perfused.   GI: no palpation of masses. No tenderness. No hepatosplenomegaly to palpation.  Musculoskeletal: normal range of motion of bilateral upper extremities. Normal muscle strength and tone.  Skin: no obvious rashes or lesions. No tightening of skin noted.  Neurologic: CN grossly normal. Normal sensation.   Psychiatric: awake, alert and oriented x 3. Mood and affect normal. Cooperative.    Pelvic exam   In and out cath performed with 100c residual  Levator ani tenderness: none    LABS REVIEW:  Recent Labs   Lab 19  0442 19  0345 19  0417   WBC 13.22 H 9.90 7.97   Hemoglobin 7.6 L 9.1 L 8.9 L   Hematocrit 22.6 L 27.5 L 26.6 L   Platelets 95 L 84 L 105 L   ]  Recent Labs   Lab 19  0343 19  0325  19  0429 19  0442 19  0345 19  0417   Sodium 143  143 138  138   < > 143 143 146 H 143   Potassium 3.7  3.7 3.5  3.5   < > 3.4 L 4.9 4.4 3.7   Chloride 110  110 105  105   < > 113 H 112 H 113 H 108   CO2 26  26 25  25   < > 21 L 27 29 29   BUN, Bld 33 H  33 H 37 H  37 H   < > 25 H 36 H 39 H 32 H   Creatinine 0.7  0.7 0.9  0.9   < > 0.7 0.7 0.7 0.7   Glucose 105  105 108  108   < > 173 H 132 H 110 103   Calcium 10.6 H  10.6 H 10.6 H  10.6 H   < > 8.5 L 9.7 9.3  9.1   Magnesium 1.8 2.0   < > 2.2 2.1 2.0  --    Phosphorus 2.7 3.6   < > 3.2 1.9 L 1.6 L  --    Alkaline Phosphatase 92 104  --   --   --   --  46 L   Total Protein 6.6 7.5  --   --   --   --  4.9 L   Albumin 4.0 4.4  --   --   --   --  2.7 L   Total Bilirubin 1.9 H 1.5 H  --   --   --   --  1.6 H   AST 21 23  --   --   --   --  20   ALT 23 30  --   --   --   --  21    < > = values in this interval not displayed.   ]    Lab Results   Component Value Date    HGBA1C 5.2 02/23/2018          Recent Pertinent urologic PATHOLOGY REVIEW:  Urine cytology [facility] 8/28/19     Negative for malignanct cells  Acute inflammation  satisfactor for eval but with scant urothelial component  bloody      Recent Pertinent Urologic RADIOGRAPHIC REVIEW: (remainder of images reviewed)  Ctu 8/28/19  CT abdomen without contrast demonstrates bilateral renal calculi with a large staghorn type calculus filling the lower pole calyx of the right kidney (1.5cm).  Early arterial phase and delayed contrast enhanced phases demonstrates no renal masses.    cta 2/23/18  T abdomen without contrast demonstrates bilateral renal calculi with a large staghorn type calculus filling the lower pole calyx of the left kidney.  Early arterial phase and delayed contrast enhanced phases demonstrates no renal masses.    Assessment:       1. Kidney stone          Plan:     There are no diagnoses linked to this encounter.    She has over 1.5 to 2cm stone in Right lower pole. Mostly anterior  -bc she has so much stone burden would have is shorter treatment course with a percutaneous nephrolithotomy, however with anterior location not a good perc   -will start patient on Flomax nightly and take for 2 weeks to help stretch the ureter  -scheduling stent and possible ureteroscopy if the ureter is dilated/stretch ([employer] scope)  -will likely need at least 2 stone procedures due to amount of stone burden  -still has stone in left side but can eventually get shockwave  about 1 to only about 4 mm  -will need UTI treated prior to each procedure, sending voided urine today but if it is contaminated will  need to return for catheter  -would need clearance from Dr. Pham for general anesthesia surgery however does not need to stop aspirin if she has ureteroscopy.  If she has percutaneous nephrolithotomy would need to see what partner recommend terms of stopping aspirin  -went over risks of ureteroscopy and stents as well of risks of not having a procedure  -already on oxycodone 10/325mg written by Dr.Jonathan Schmitz (has a pain contract, last saw him 2 months ago-sees Oct 3rd) takes 3 a day for back pain  -sending cath urine today     We will notify her of plan  -stent and possible urs on oct 2  -then 2 weeks later on oct 16th would do another urs with [employer] scope    Plan:  · Go ahead and start flomax 0.4mg nightly  · Writing for cipro twice a day for 7 days to start today (even if cx neg), stone tends to harbor infection. Sent cath urine from today. If culture is not sensitive to cipro will have to change to October 9th.   · Will go ahead and get cardiac clearance for surgery - from  for general anesthesia (ok to remain on ASA if having ureteroscopy, but may need to stop if having perc-would she be ok to stop if necessary).  · Need clearance from pain doctor by Dr.Jonathan Schmitz to give pain medicines. Would prefer that  writes the meds post-procedure but I can if ok with him  · Had labs drawn earlier this week but will get a cbc and bmp done at pre-op if n/a from 's office  · Will also repeat a CXR  · Had an ekg done at 's office this week as well  · Schedule for pre-op appt for Friday 9/27    Route   Clearances from  and pain contract/pain med clearance from  ([phone])          Niharika Collier MD

## 2019-09-26 NOTE — PATIENT INSTRUCTIONS
She has over 1.5 to 2cm stone in Right lower pole. Mostly anterior  -bc she has so much stone burden would have is shorter treatment course with a percutaneous nephrolithotomy, however with anterior location may not be amenable  -if not amenable to percutaneous nephrolithotomy, then will plan for staged procedure  -will start patient on Flomax nightly and take for 2 weeks to help stretch the ureter  -then will schedule stent and possible ureteroscopy if the ureter is dilated/stretch ([employer] scope)  -will likely need at least 2 stone procedures due to amount of stone burden  -still has stone in left side but can eventually get shockwave about 1 to only about 4 mm  -will need UTI treated prior to each procedure, sending voided urine today but if it is contaminated will  need to return for catheter  -would need clearance from Dr. Pham for general anesthesia surgery however does not need to stop aspirin if she has ureteroscopy.  If she has percutaneous nephrolithotomy would need to see what partner recommend terms of stopping aspirin  -went over risks of ureteroscopy and stents as well of risks of not having a procedure  -already on oxycodone 10/325mg written by Dr.Jonathan Schmitz (has a pain contract, last saw him 2 months ago-sees Oct 3rd) takes 3 a day for back pain  -sending cath urine today     We will notify her of plan  -stent and possible urs on oct 9  -then 2 weeks later on oct 23th would do another urs with [employer] scope    Plan:  · Go ahead and start flomax 0.4mg nightly  · Will plan to give abx 7d before oct 9th (even if cx neg), stone tends to harbor infection. Sent cath urine from today   · Will go ahead and get cardiac clearance for surgery - from  for general anesthesia (ok to remain on ASA if having ureteroscopy, but may need to stop if having perc-would she be ok to stop if necessary).  · Need clearance from pain doctor by Dr.Jonathan Schmitz to give pain medicines.  Would prefer that  writes the meds post-procedure but I can if ok with him    Route   Clearances from  and pain contract/pain med clearance from  ([phone])

## 2019-09-27 ENCOUNTER — HOSPITAL ENCOUNTER (OUTPATIENT)
Dept: RADIOLOGY | Facility: HOSPITAL | Age: 64
Discharge: HOME | End: 2019-09-27
Attending: UROLOGY
Payer: COMMERCIAL

## 2019-09-27 ENCOUNTER — HOSPITAL ENCOUNTER (OUTPATIENT)
Dept: PREADMISSION TESTING | Facility: HOSPITAL | Age: 64
Discharge: HOME | End: 2019-09-27
Attending: UROLOGY
Payer: COMMERCIAL

## 2019-09-27 ENCOUNTER — TELEPHONE (OUTPATIENT)
Dept: UROLOGY | Facility: CLINIC | Age: 64
End: 2019-09-27

## 2019-09-27 DIAGNOSIS — N20.0 KIDNEY STONE: ICD-10-CM

## 2019-09-27 LAB
ANION GAP SERPL CALC-SCNC: 10 MMOL/L (ref 8–16)
BASOPHILS # BLD AUTO: 0.03 K/UL (ref 0–0.2)
BASOPHILS NFR BLD: 0.5 % (ref 0–1.9)
BUN SERPL-MCNC: 22 MG/DL (ref 8–23)
CALCIUM SERPL-MCNC: 11.2 MG/DL (ref 8.7–10.5)
CHLORIDE SERPL-SCNC: 104 MMOL/L (ref 95–110)
CO2 SERPL-SCNC: 31 MMOL/L (ref 23–29)
CREAT SERPL-MCNC: 0.9 MG/DL (ref 0.5–1.4)
DIFFERENTIAL METHOD: ABNORMAL
EOSINOPHIL # BLD AUTO: 0.2 K/UL (ref 0–0.5)
EOSINOPHIL NFR BLD: 3.9 % (ref 0–8)
ERYTHROCYTE [DISTWIDTH] IN BLOOD BY AUTOMATED COUNT: 12.5 % (ref 11.5–14.5)
EST. GFR  (AFRICAN AMERICAN): >60 ML/MIN/1.73 M^2
EST. GFR  (NON AFRICAN AMERICAN): >60 ML/MIN/1.73 M^2
GLUCOSE SERPL-MCNC: 110 MG/DL (ref 70–110)
HCT VFR BLD AUTO: 36.9 % (ref 37–48.5)
HGB BLD-MCNC: 11.9 G/DL (ref 12–16)
IMM GRANULOCYTES # BLD AUTO: 0.04 K/UL (ref 0–0.04)
LYMPHOCYTES # BLD AUTO: 2 K/UL (ref 1–4.8)
LYMPHOCYTES NFR BLD: 32.8 % (ref 18–48)
MCH RBC QN AUTO: 31.6 PG (ref 27–31)
MCHC RBC AUTO-ENTMCNC: 32.2 G/DL (ref 32–36)
MCV RBC AUTO: 98 FL (ref 82–98)
MONOCYTES # BLD AUTO: 0.8 K/UL (ref 0.3–1)
MONOCYTES NFR BLD: 13.5 % (ref 4–15)
NEUTROPHILS # BLD AUTO: 3 K/UL (ref 1.8–7.7)
NEUTROPHILS NFR BLD: 48.6 % (ref 38–73)
NRBC BLD-RTO: 0 /100 WBC
PLATELET # BLD AUTO: 323 K/UL (ref 150–350)
PMV BLD AUTO: 9.1 FL (ref 9.2–12.9)
POTASSIUM SERPL-SCNC: 4.6 MMOL/L (ref 3.5–5.1)
RBC # BLD AUTO: 3.76 M/UL (ref 4–5.4)
SODIUM SERPL-SCNC: 145 MMOL/L (ref 136–145)
WBC # BLD AUTO: 6.15 K/UL (ref 3.9–12.7)

## 2019-09-27 PROCEDURE — 99900103 DSU ONLY-NO CHARGE-INITIAL HR (STAT)

## 2019-09-27 PROCEDURE — 99900104 DSU ONLY-NO CHARGE-EA ADD'L HR (STAT)

## 2019-09-27 PROCEDURE — 80048 BASIC METABOLIC PNL TOTAL CA: CPT

## 2019-09-27 PROCEDURE — 71046 XR CHEST PA AND LATERAL: ICD-10-PCS | Mod: 26,,, | Performed by: RADIOLOGY

## 2019-09-27 PROCEDURE — 85025 COMPLETE CBC W/AUTO DIFF WBC: CPT

## 2019-09-27 PROCEDURE — 36415 COLL VENOUS BLD VENIPUNCTURE: CPT

## 2019-09-27 PROCEDURE — 71046 X-RAY EXAM CHEST 2 VIEWS: CPT | Mod: TC,FY

## 2019-09-27 PROCEDURE — 71046 X-RAY EXAM CHEST 2 VIEWS: CPT | Mod: 26,,, | Performed by: RADIOLOGY

## 2019-09-27 RX ORDER — OXYCODONE AND ACETAMINOPHEN 10; 325 MG/1; MG/1
1 TABLET ORAL EVERY 4 HOURS PRN
Status: ON HOLD | COMMUNITY
End: 2019-10-16 | Stop reason: SDUPTHER

## 2019-09-27 NOTE — DISCHARGE INSTRUCTIONS
To confirm, Your doctor has instructed you that surgery is scheduled for:     Please report to [facility], Registration the morning of surgery. You must check-in and receive a wristband before going to your procedure.    Pre-Op will call the afternoon prior to surgery between 1:00 and 6:00 PM with the final arrival time.  Phone number: [phone]    PLEASE NOTE:  The surgery schedule has many variables which may affect the time of your surgery case.  Family members should be available if your surgery time changes.  Plan to be here the day of your procedure between 4-6 hours.    MEDICATIONS:  TAKE ONLY THESE MEDICATIONS WITH A SMALL SIP OF WATER THE MORNING OF YOUR PROCEDURE:  METOPROLOL, ATORVASTATIN, OXYCODONE    DO NOT TAKE THESE MEDICATIONS 5-7 DAYS PRIOR to your procedure or per your surgeon's request: ASPIRIN, ALEVE, ADVIL, IBUPROFEN, FISH OIL VITAMIN E, HERBALS  (May take Tylenol)    ONLY if you are prescribed any types of blood thinners such as:  Aspirin, Coumadin, Plavix, Pradaxa, Xarelto, Aggrenox, Effient, Eliquis, Savasya, Brilinta, or any other, ask your surgeon whether you should stop taking them and how long before surgery you should stop.  You may also need to verify with the prescribing physician if it is ok to stop your medication.      INSTRUCTIONS IMPORTANT!!  · Do not eat or drink anything between midnight and the time of your procedure- this includes gum, mints, and candy.  · Do not smoke or drink alcoholic beverages 24 hours prior to your procedure.  · Shower the night before AND the morning of your procedure with a Chlorhexidine wash such as Hibiclens or Dial antibacterial soap from the neck down.  Do not get it on your face or in your eyes.  You may use your own shampoo and face wash. This helps your skin to be as bacteria free as possible.    · If you wear contact lenses, dentures, hearing aids or glasses, bring a container to put them in during surgery and give to a  family member for safe keeping.  Please leave all jewelry, piercing's and valuables at home.   · DO NOT remove hair from the surgery site.  Do not shave the incision site unless you are given specific instructions to do so.    · ONLY if you have been diagnosed with sleep apnea please bring your C-PAP machine.  · ONLY if you wear home oxygen please bring your portable oxygen tank the day of your procedure.  · ONLY if you have a history of OPEN HEART SURGERY you will need a clearance from your Cardiologist per Anesthesia.      · ONLY for patients requiring bowel prep, written instructions will be given by your doctor's office.  · ONLY if you have a neuro stimulator, please bring the controller with you the morning of surgery  · ONLY if a type and screen test is needed before surgery, please return:  · If your doctor has scheduled you for an overnight stay, bring a small overnight bag with any personal items you need.  · Make arrangements in advance for transportation home by a responsible adult.  It is not safe to drive a vehicle during the 24 hours after anesthesia.      · Visiting hours are 10:00AM to 8:30PM.  For the safety of all patients, visitors under the age of 12 are not allowed above the first floor of the hospital.    · All [facility] facilities and properties are tobacco free.  Smoking is NOT allowed.       If you have any questions about these instructions, call Pre-Op Admit  Nursing at [phone] or the Pre-Op Day Surgery Unit at [phone].

## 2019-09-27 NOTE — TELEPHONE ENCOUNTER
----- Message from Niharika Collier MD sent at 9/26/2019  5:18 PM CDT -----  Please refax my new note to  and dr england for clearance  Pt scheduled for surgery next wed   Needs preop appt tomnorrkiah 9/27

## 2019-09-27 NOTE — TELEPHONE ENCOUNTER
Office note for clearances faxed to Dr Schmitz and Dr Pham office. Spoke with patient, pre-admit appt made for this afternoon, patient verbally understood.

## 2019-09-29 LAB — BACTERIA UR CULT: ABNORMAL

## 2019-09-30 ENCOUNTER — TELEPHONE (OUTPATIENT)
Dept: UROLOGY | Facility: CLINIC | Age: 64
End: 2019-09-30

## 2019-09-30 NOTE — TELEPHONE ENCOUNTER
----- Message from Chintan Ribeiro sent at 9/30/2019  4:49 PM CDT -----  Type:  Patient Returning Call    Who Called:  Patient  Who Left Message for Patient:  Suasn   Does the patient know what this is regarding?: NA   Best Call Back Number:  [phone]  Additional Information:

## 2019-09-30 NOTE — TELEPHONE ENCOUNTER
Clearance received from  Colette Bo with pain management from [facility]. Received 9/30  No contraindications  Taking percocet 10/235mg. 90 pills dispensed 6/12/19  We will plan to write her pain meds short term     Cleared by , received 9/30  Ok ot hold asa 5d prior  Cleared from cardiac standpoint

## 2019-09-30 NOTE — TELEPHONE ENCOUNTER
Clearances faxed to preop this morning.    Phoned patient no answer left message on voicemail to give the office a call back.

## 2019-10-01 ENCOUNTER — ANESTHESIA EVENT (OUTPATIENT)
Dept: SURGERY | Facility: HOSPITAL | Age: 64
End: 2019-10-01
Payer: COMMERCIAL

## 2019-10-01 NOTE — H&P
[facility] H&P Note - Geff  Staff: MD Nando    Referring provider and please cc:   No referring provider defined for this encounter.     PCP: Jan Petersen MD    [facility] Utilization: active    Chief Complaint: No chief complaint on file.        Subjective:        HPI: Nancy Muñoz is a 63 y.o. female     -h/o ureteral R requiring extraction around 2011. None since. No family hx of stones.   -She went to [facility] for CP and was found to have MI, sent to [facility] for angiogram by , unsuccessful and then underwent a CABG by  around 8/5, currently ASA 81mg. Then developed Gh w/o UTI sx. Never had GH before.  Found to have proteus UTI, ct showed  Large 1.5cm R lower pole stone and 3mm LLP stone.  Denies any flank pain. Cytology 8/28/19 was neg. (+smoker but quit)  -h/o 2 back surgeries last year and 3rd bad disc.   -already on oxycodone 10/325mg written by Dr.Jonathan Schmitz (has a pain contract, last saw him 2 months ago-sees Oct 3rd) takes 3 a day for back pain    Here today for stone treatment      Urine history:   9/26/19 P.mirablis, pvr by in and out cath: 100cc cloudy  9/4/19  No cx, void: 1+leuk, 19 wbc/8 rbc  8/28/19 P.mirabilis, void: 2+bld/2+leuk, 15 wbc, >100 rbc, cath: 3+ bld/3+leuk  2/23/18 Ng, void: 1+leuk, 38 wbc/2 rbc      REVIEW OF SYSTEMS:  General ROS: no fevers, no chills  Psychological ROS: no depression  Endocrine ROS: no heat or cold  Respiratory ROS:+ SOB since MI  Cardiovascular ROS: no CP  Gastrointestinal ROS: no abdominal pain, no constipation, no diarrhea, noBRBPR  Musculoskeletal ROS: no muscle pain  Neurological ROS: no headaches  Dermatological ROS: no rashes  HEENT: noglasses, no sinus   ROS: per HPI     PMHx:  Past Medical History:   Diagnosis Date    Allergy     Arthritis     spine-ddd    Asthma     usu allergy related    Back pain     Coronary artery disease 08/2019    cabg scheduled    Diverticulosis     Encounter for blood  transfusion     High calcium levels 2015    Hypercholesteremia 2017    Hypertension     dr plunkett & dr pham    Hyperthyroidism     ???    Indigestion 08/2019    slight problem yrs ago    Lumbar radiculitis 1/30/2018    MI (myocardial infarction) 08/22/2019    angio done- cabg scheduled    NSTEMI (non-ST elevated myocardial infarction) 8/25/2019    Pain management 08/2019    dr mays ([place])    Renal stone 08/2019    to f/u with urology post op    Renal stone 2015    stone removed    Staph infection 2005    treated       PSHx:  Past Surgical History:   Procedure Laterality Date    ANGIOGRAM, CORONARY, WITH LEFT HEART CATHETERIZATION  8/27/2019    Procedure: Angiogram, Coronary, with Left Heart Cath;  Surgeon: Sean Pham MD;  Location: [facility] CATH/EP LAB;  Service: Cardiology;;    APPENDECTOMY      BACK SURGERY      CERVICAL, LUMBAR    CARDIAC SURGERY      CABG X 3  9-5-19    COLONOSCOPY      x 2    CORONARY ANGIOGRAPHY Left 8/27/2019    Procedure: ANGIOGRAM, CORONARY ARTERY;  Surgeon: Sean Pham MD;  Location: [facility] CATH/EP LAB;  Service: Cardiology;  Laterality: Left;    CORONARY ARTERY BYPASS GRAFT (CABG) N/A 9/5/2019    Procedure: CABG w/ EVH;  Surgeon: Freddy Julien MD;  Location: [facility] OR;  Service: Cardiothoracic;  Laterality: N/A;    ENDOSCOPIC HARVEST OF VEIN N/A 9/5/2019    Procedure: HARVEST-VEIN-ENDOVASCULAR;  Surgeon: Freddy Julien MD;  Location: [facility] OR;  Service: Cardiothoracic;  Laterality: N/A;    SPINE SURGERY      TONSILLECTOMY         Stents/Valves/Foreign Bodies/Cardiac Evaluation/Cardiologist: /  GI: , last colonoscopy:18 polyps, 2017 - was told to come back yearly but hasn't been able to go back    No family history on file.   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 No wheezing.   Cardiovascular: No obvious extremity edema. Warm and well perfused.   GI: no palpation of masses. No tenderness. No hepatosplenomegaly to palpation.  Musculoskeletal: normal range of motion of bilateral upper extremities. Normal muscle strength and tone.  Skin: no obvious rashes or lesions. No tightening of skin noted.  Neurologic: CN grossly normal. Normal sensation.   Psychiatric: awake, alert and oriented x 3. Mood and affect normal. Cooperative.    Pelvic exam   In and out cath performed with 100c residual  Levator ani tenderness: none    LABS REVIEW:  Recent Labs   Lab 19  0345 19  0417 19  1505   WBC 9.90 7.97 6.15   Hemoglobin 9.1 L 8.9 L 11.9 L   Hematocrit 27.5 L 26.6 L 36.9 L   Platelets 84 L 105 L 323   ]  Recent Labs   Lab 19  0343 19  0325  19  0429 19  0442 19  0345 19  0417 19  1505   Sodium 143  143 138  138   < > 143 143 146 H 143 145   Potassium 3.7  3.7 3.5  3.5   < > 3.4 L 4.9 4.4 3.7 4.6   Chloride 110  110 105  105   < > 113 H 112 H 113 H 108 104   CO2 26  26 25  25   < > 21 L 27 29 29 31 H   BUN, Bld 33 H  33 H 37 H  37 H   < > 25 H 36 H 39 H 32 H 22   Creatinine 0.7  0.7 0.9  0.9   < > 0.7 0.7 0.7 0.7 0.9   Glucose 105  105  108  108   < > 173 H 132 H 110 103 110   Calcium 10.6 H  10.6 H 10.6 H  10.6 H   < > 8.5 L 9.7 9.3 9.1 11.2 H   Magnesium 1.8 2.0   < > 2.2 2.1 2.0  --   --    Phosphorus 2.7 3.6   < > 3.2 1.9 L 1.6 L  --   --    Alkaline Phosphatase 92 104  --   --   --   --  46 L  --    Total Protein 6.6 7.5  --   --   --   --  4.9 L  --    Albumin 4.0 4.4  --   --   --   --  2.7 L  --    Total Bilirubin 1.9 H 1.5 H  --   --   --   --  1.6 H  --    AST 21 23  --   --   --   --  20  --    ALT 23 30  --   --   --   --  21  --     < > = values in this interval not displayed.   ]    Lab Results   Component Value Date    HGBA1C 5.2 02/23/2018          Recent Pertinent urologic PATHOLOGY REVIEW:  Urine cytology [facility] 8/28/19     Negative for malignanct cells  Acute inflammation  satisfactor for eval but with scant urothelial component  bloody      Recent Pertinent Urologic RADIOGRAPHIC REVIEW: (remainder of images reviewed)  Ctu 8/28/19  CT abdomen without contrast demonstrates bilateral renal calculi with a large staghorn type calculus filling the lower pole calyx of the right kidney (1.5cm).  Early arterial phase and delayed contrast enhanced phases demonstrates no renal masses.    cta 2/23/18  T abdomen without contrast demonstrates bilateral renal calculi with a large staghorn type calculus filling the lower pole calyx of the left kidney.  Early arterial phase and delayed contrast enhanced phases demonstrates no renal masses.    Assessment:       1. Kidney stone          Plan:     There are no diagnoses linked to this encounter.    She has over 1.5 to 2cm stone in Right lower pole. Mostly anterior  -bc she has so much stone burden would have is shorter treatment course with a percutaneous nephrolithotomy, however with anterior location not a good perc   -will start patient on Flomax nightly and take for 2 weeks to help stretch the ureter  -scheduling stent and possible ureteroscopy if the ureter is dilated/stretch ([employer]  Scientific scope)  -will likely need at least 2 stone procedures due to amount of stone burden  -still has stone in left side but can eventually get shockwave about 1 to only about 4 mm  -will need UTI treated prior to each procedure, sending voided urine today but if it is contaminated will  need to return for catheter  -would need clearance from Dr. Pham for general anesthesia surgery however does not need to stop aspirin if she has ureteroscopy.  If she has percutaneous nephrolithotomy would need to see what partner recommend terms of stopping aspirin  -went over risks of ureteroscopy and stents as well of risks of not having a procedure  -already on oxycodone 10/325mg written by Dr.Jonathan Schmitz (has a pain contract, last saw him 2 months ago-sees Oct 3rd) takes 3 a day for back pain  -sending cath urine today     -stent and possible urs on oct 2  -then 2 weeks later on oct 16th would do another urs with [employer] scope    Plan:  · Has been on flomax for 1 week   · Has been on cipro since 9/26  · Clearance received from   · Clearance received to write pain meds from   · Had labs drawn earlier this week but will get a cbc and bmp done at pre-op if n/a from 's office  · Had an ekg done at 's office this week as well  · Schedule for pre-op appt for Friday 9/27      Clearances from  and pain contract/pain med clearance from  ([phone])          Niharika Collier MD

## 2019-10-02 ENCOUNTER — ANESTHESIA (OUTPATIENT)
Dept: SURGERY | Facility: HOSPITAL | Age: 64
End: 2019-10-02
Payer: COMMERCIAL

## 2019-10-02 ENCOUNTER — HOSPITAL ENCOUNTER (OUTPATIENT)
Facility: HOSPITAL | Age: 64
Discharge: HOME | End: 2019-10-02
Attending: UROLOGY | Admitting: UROLOGY
Payer: COMMERCIAL

## 2019-10-02 ENCOUNTER — TELEPHONE (OUTPATIENT)
Dept: UROLOGY | Facility: CLINIC | Age: 64
End: 2019-10-02

## 2019-10-02 DIAGNOSIS — N20.0 KIDNEY STONE: ICD-10-CM

## 2019-10-02 DIAGNOSIS — N20.0 NEPHROLITHIASIS: Primary | ICD-10-CM

## 2019-10-02 LAB
BACTERIA #/AREA URNS HPF: ABNORMAL /HPF
BILIRUB UR QL STRIP: NEGATIVE
CLARITY UR: CLEAR
COLOR UR: YELLOW
GLUCOSE UR QL STRIP: NEGATIVE
HGB UR QL STRIP: ABNORMAL
KETONES UR QL STRIP: NEGATIVE
LEUKOCYTE ESTERASE UR QL STRIP: ABNORMAL
MICROSCOPIC COMMENT: ABNORMAL
NITRITE UR QL STRIP: NEGATIVE
PH UR STRIP: 6 [PH] (ref 5–8)
PROT UR QL STRIP: NEGATIVE
RBC #/AREA URNS HPF: 19 /HPF (ref 0–4)
SP GR UR STRIP: <=1.005 (ref 1–1.03)
SQUAMOUS #/AREA URNS HPF: 2 /HPF
URN SPEC COLLECT METH UR: ABNORMAL
UROBILINOGEN UR STRIP-ACNC: NEGATIVE EU/DL
WBC #/AREA URNS HPF: 7 /HPF (ref 0–5)

## 2019-10-02 PROCEDURE — 71000015 HC POSTOP RECOV 1ST HR: Performed by: UROLOGY

## 2019-10-02 PROCEDURE — C2617 STENT, NON-COR, TEM W/O DEL: HCPCS | Performed by: UROLOGY

## 2019-10-02 PROCEDURE — 36000707: Performed by: UROLOGY

## 2019-10-02 PROCEDURE — 52356 PR CYSTO/URETERO W/LITHOTRIPSY: ICD-10-PCS | Mod: 22,RT,, | Performed by: UROLOGY

## 2019-10-02 PROCEDURE — 71000016 HC POSTOP RECOV ADDL HR: Performed by: UROLOGY

## 2019-10-02 PROCEDURE — C1769 GUIDE WIRE: HCPCS | Performed by: UROLOGY

## 2019-10-02 PROCEDURE — 99900103 DSU ONLY-NO CHARGE-INITIAL HR (STAT): Performed by: UROLOGY

## 2019-10-02 PROCEDURE — 63600175 PHARM REV CODE 636 W HCPCS: Performed by: ANESTHESIOLOGY

## 2019-10-02 PROCEDURE — 37000008 HC ANESTHESIA 1ST 15 MINUTES: Performed by: UROLOGY

## 2019-10-02 PROCEDURE — 81000 URINALYSIS NONAUTO W/SCOPE: CPT

## 2019-10-02 PROCEDURE — 37000009 HC ANESTHESIA EA ADD 15 MINS: Performed by: UROLOGY

## 2019-10-02 PROCEDURE — C1894 INTRO/SHEATH, NON-LASER: HCPCS | Performed by: UROLOGY

## 2019-10-02 PROCEDURE — 82365 CALCULUS SPECTROSCOPY: CPT

## 2019-10-02 PROCEDURE — 71000033 HC RECOVERY, INTIAL HOUR: Performed by: UROLOGY

## 2019-10-02 PROCEDURE — 27200651 HC AIRWAY, LMA: Performed by: NURSE ANESTHETIST, CERTIFIED REGISTERED

## 2019-10-02 PROCEDURE — 63600175 PHARM REV CODE 636 W HCPCS: Performed by: NURSE ANESTHETIST, CERTIFIED REGISTERED

## 2019-10-02 PROCEDURE — D9220A PRA ANESTHESIA: Mod: ANES,,, | Performed by: ANESTHESIOLOGY

## 2019-10-02 PROCEDURE — 63600175 PHARM REV CODE 636 W HCPCS: Performed by: UROLOGY

## 2019-10-02 PROCEDURE — D9220A PRA ANESTHESIA: ICD-10-PCS | Mod: ANES,,, | Performed by: ANESTHESIOLOGY

## 2019-10-02 PROCEDURE — 25000003 PHARM REV CODE 250: Performed by: ANESTHESIOLOGY

## 2019-10-02 PROCEDURE — 25000003 PHARM REV CODE 250: Performed by: NURSE ANESTHETIST, CERTIFIED REGISTERED

## 2019-10-02 PROCEDURE — D9220A PRA ANESTHESIA: Mod: CRNA,,, | Performed by: NURSE ANESTHETIST, CERTIFIED REGISTERED

## 2019-10-02 PROCEDURE — 74420 PR  X-RAY RETROGRADE PYELOGRAM: ICD-10-PCS | Mod: 26,,, | Performed by: UROLOGY

## 2019-10-02 PROCEDURE — 36000706: Performed by: UROLOGY

## 2019-10-02 PROCEDURE — 99900104 DSU ONLY-NO CHARGE-EA ADD'L HR (STAT): Performed by: UROLOGY

## 2019-10-02 PROCEDURE — 25500020 PHARM REV CODE 255: Performed by: UROLOGY

## 2019-10-02 PROCEDURE — 74420 UROGRAPHY RTRGR +-KUB: CPT | Mod: 26,,, | Performed by: UROLOGY

## 2019-10-02 PROCEDURE — D9220A PRA ANESTHESIA: ICD-10-PCS | Mod: CRNA,,, | Performed by: NURSE ANESTHETIST, CERTIFIED REGISTERED

## 2019-10-02 PROCEDURE — 52356 CYSTO/URETERO W/LITHOTRIPSY: CPT | Mod: 22,RT,, | Performed by: UROLOGY

## 2019-10-02 PROCEDURE — 27201423 OPTIME MED/SURG SUP & DEVICES STERILE SUPPLY: Performed by: UROLOGY

## 2019-10-02 PROCEDURE — C1758 CATHETER, URETERAL: HCPCS | Performed by: UROLOGY

## 2019-10-02 DEVICE — STENT SET URETERAL 6FR 22CM: Type: IMPLANTABLE DEVICE | Site: URETER | Status: FUNCTIONAL

## 2019-10-02 RX ORDER — OXYCODONE AND ACETAMINOPHEN 5; 325 MG/1; MG/1
1 TABLET ORAL
Status: DISCONTINUED | OUTPATIENT
Start: 2019-10-02 | End: 2019-10-02 | Stop reason: HOSPADM

## 2019-10-02 RX ORDER — FENTANYL CITRATE 50 UG/ML
25 INJECTION, SOLUTION INTRAMUSCULAR; INTRAVENOUS EVERY 5 MIN PRN
Status: DISCONTINUED | OUTPATIENT
Start: 2019-10-02 | End: 2019-10-02 | Stop reason: HOSPADM

## 2019-10-02 RX ORDER — SULFAMETHOXAZOLE AND TRIMETHOPRIM 800; 160 MG/1; MG/1
1 TABLET ORAL 2 TIMES DAILY
Qty: 14 TABLET | Refills: 0 | Status: SHIPPED | OUTPATIENT
Start: 2019-10-10 | End: 2019-10-17

## 2019-10-02 RX ORDER — ONDANSETRON 2 MG/ML
INJECTION INTRAMUSCULAR; INTRAVENOUS
Status: DISCONTINUED | OUTPATIENT
Start: 2019-10-02 | End: 2019-10-02

## 2019-10-02 RX ORDER — LIDOCAINE HYDROCHLORIDE 10 MG/ML
1 INJECTION, SOLUTION EPIDURAL; INFILTRATION; INTRACAUDAL; PERINEURAL ONCE
Status: DISCONTINUED | OUTPATIENT
Start: 2019-10-02 | End: 2019-10-02 | Stop reason: HOSPADM

## 2019-10-02 RX ORDER — KETOROLAC TROMETHAMINE 30 MG/ML
INJECTION, SOLUTION INTRAMUSCULAR; INTRAVENOUS
Status: DISCONTINUED | OUTPATIENT
Start: 2019-10-02 | End: 2019-10-02

## 2019-10-02 RX ORDER — METOCLOPRAMIDE HYDROCHLORIDE 5 MG/ML
10 INJECTION INTRAMUSCULAR; INTRAVENOUS EVERY 10 MIN PRN
Status: DISCONTINUED | OUTPATIENT
Start: 2019-10-02 | End: 2019-10-02 | Stop reason: HOSPADM

## 2019-10-02 RX ORDER — GLYCOPYRROLATE 0.2 MG/ML
INJECTION INTRAMUSCULAR; INTRAVENOUS
Status: DISCONTINUED | OUTPATIENT
Start: 2019-10-02 | End: 2019-10-02

## 2019-10-02 RX ORDER — PHENYLEPHRINE HYDROCHLORIDE 10 MG/ML
INJECTION INTRAVENOUS
Status: DISCONTINUED | OUTPATIENT
Start: 2019-10-02 | End: 2019-10-02

## 2019-10-02 RX ORDER — MIDAZOLAM HYDROCHLORIDE 1 MG/ML
INJECTION, SOLUTION INTRAMUSCULAR; INTRAVENOUS
Status: DISCONTINUED | OUTPATIENT
Start: 2019-10-02 | End: 2019-10-02

## 2019-10-02 RX ORDER — PROPOFOL 10 MG/ML
VIAL (ML) INTRAVENOUS
Status: DISCONTINUED | OUTPATIENT
Start: 2019-10-02 | End: 2019-10-02

## 2019-10-02 RX ORDER — DEXAMETHASONE SODIUM PHOSPHATE 4 MG/ML
INJECTION, SOLUTION INTRA-ARTICULAR; INTRALESIONAL; INTRAMUSCULAR; INTRAVENOUS; SOFT TISSUE
Status: DISCONTINUED | OUTPATIENT
Start: 2019-10-02 | End: 2019-10-02

## 2019-10-02 RX ORDER — LIDOCAINE HCL/PF 100 MG/5ML
SYRINGE (ML) INTRAVENOUS
Status: DISCONTINUED | OUTPATIENT
Start: 2019-10-02 | End: 2019-10-02

## 2019-10-02 RX ORDER — KETOROLAC TROMETHAMINE 10 MG/1
10 TABLET, FILM COATED ORAL EVERY 8 HOURS PRN
Qty: 10 TABLET | Refills: 0 | Status: SHIPPED | OUTPATIENT
Start: 2019-10-02 | End: 2019-10-07

## 2019-10-02 RX ORDER — OXYCODONE AND ACETAMINOPHEN 10; 325 MG/1; MG/1
1 TABLET ORAL EVERY 4 HOURS PRN
Qty: 15 TABLET | Refills: 0 | Status: ON HOLD | OUTPATIENT
Start: 2019-10-02 | End: 2019-10-16 | Stop reason: CLARIF

## 2019-10-02 RX ORDER — SODIUM CHLORIDE, SODIUM LACTATE, POTASSIUM CHLORIDE, CALCIUM CHLORIDE 600; 310; 30; 20 MG/100ML; MG/100ML; MG/100ML; MG/100ML
INJECTION, SOLUTION INTRAVENOUS CONTINUOUS
Status: DISCONTINUED | OUTPATIENT
Start: 2019-10-02 | End: 2019-10-02 | Stop reason: HOSPADM

## 2019-10-02 RX ORDER — FENTANYL CITRATE 50 UG/ML
INJECTION, SOLUTION INTRAMUSCULAR; INTRAVENOUS
Status: DISCONTINUED | OUTPATIENT
Start: 2019-10-02 | End: 2019-10-02

## 2019-10-02 RX ADMIN — DEXAMETHASONE SODIUM PHOSPHATE 4 MG: 4 INJECTION, SOLUTION INTRAMUSCULAR; INTRAVENOUS at 10:10

## 2019-10-02 RX ADMIN — LIDOCAINE HYDROCHLORIDE 100 MG: 20 INJECTION, SOLUTION INTRAVENOUS at 10:10

## 2019-10-02 RX ADMIN — GLYCOPYRROLATE 0.2 MG: 0.2 INJECTION, SOLUTION INTRAMUSCULAR; INTRAVENOUS at 10:10

## 2019-10-02 RX ADMIN — PHENYLEPHRINE HYDROCHLORIDE 100 MCG: 10 INJECTION INTRAVENOUS at 10:10

## 2019-10-02 RX ADMIN — SODIUM CHLORIDE, SODIUM LACTATE, POTASSIUM CHLORIDE, AND CALCIUM CHLORIDE: .6; .31; .03; .02 INJECTION, SOLUTION INTRAVENOUS at 11:10

## 2019-10-02 RX ADMIN — PHENYLEPHRINE HYDROCHLORIDE 100 MCG: 10 INJECTION INTRAVENOUS at 11:10

## 2019-10-02 RX ADMIN — OXYCODONE AND ACETAMINOPHEN 1 TABLET: 5; 325 TABLET ORAL at 12:10

## 2019-10-02 RX ADMIN — ONDANSETRON 4 MG: 2 INJECTION, SOLUTION INTRAMUSCULAR; INTRAVENOUS at 10:10

## 2019-10-02 RX ADMIN — FENTANYL CITRATE 25 MCG: 50 INJECTION, SOLUTION INTRAMUSCULAR; INTRAVENOUS at 10:10

## 2019-10-02 RX ADMIN — FENTANYL CITRATE 50 MCG: 50 INJECTION, SOLUTION INTRAMUSCULAR; INTRAVENOUS at 11:10

## 2019-10-02 RX ADMIN — FENTANYL CITRATE 25 MCG: 50 INJECTION, SOLUTION INTRAMUSCULAR; INTRAVENOUS at 11:10

## 2019-10-02 RX ADMIN — CEFTRIAXONE 2 G: 2 INJECTION, SOLUTION INTRAVENOUS at 10:10

## 2019-10-02 RX ADMIN — FENTANYL CITRATE 50 MCG: 50 INJECTION, SOLUTION INTRAMUSCULAR; INTRAVENOUS at 12:10

## 2019-10-02 RX ADMIN — KETOROLAC TROMETHAMINE 30 MG: 30 INJECTION, SOLUTION INTRAMUSCULAR; INTRAVENOUS at 12:10

## 2019-10-02 RX ADMIN — PROPOFOL 160 MG: 10 INJECTION, EMULSION INTRAVENOUS at 10:10

## 2019-10-02 RX ADMIN — MIDAZOLAM 2 MG: 1 INJECTION INTRAMUSCULAR; INTRAVENOUS at 10:10

## 2019-10-02 RX ADMIN — SODIUM CHLORIDE, SODIUM LACTATE, POTASSIUM CHLORIDE, AND CALCIUM CHLORIDE: .6; .31; .03; .02 INJECTION, SOLUTION INTRAVENOUS at 09:10

## 2019-10-02 NOTE — ANESTHESIA POSTPROCEDURE EVALUATION
Anesthesia Post Evaluation    Patient: Nancy Muñoz    Procedure(s) Performed: Procedure(s) (LRB):  CYSTOURETEROSCOPY, WITH RETROGRADE PYELOGRAM AND URETERAL STENT INSERTION (Right)  CYSTOSCOPY, WITH CALCULUS REMOVAL (Right)  LITHOTRIPSY, USING LASER (Right)    Final Anesthesia Type: general  Patient location during evaluation: PACU  Patient participation: Yes- Able to Participate  Level of consciousness: awake and alert  Post-procedure vital signs: reviewed and stable  Pain management: adequate  Airway patency: patent  PONV status at discharge: No PONV  Anesthetic complications: no      Cardiovascular status: blood pressure returned to baseline  Respiratory status: unassisted  Hydration status: euvolemic  Follow-up not needed.          Vitals Value Taken Time   /73 10/2/2019 12:38 PM   Temp 36.4 °C (97.5 °F) 10/2/2019 12:25 PM   Pulse 86 10/2/2019 12:42 PM   Resp 10 10/2/2019 12:42 PM   SpO2 100 % 10/2/2019 12:42 PM   Vitals shown include unvalidated device data.      No case tracking events are documented in the log.      Pain/Ellyn Score: Ellyn Score: 6 (10/2/2019 12:35 PM)

## 2019-10-02 NOTE — PLAN OF CARE
Discharge instructions and handouts given. Questions answered. Pt and spouse verbalized understanding. Pt able to teach back material. Pt voided without issues. Pt escorted off unit per this nurse without incident. Belongings and discharge paperwork sent with pt.

## 2019-10-02 NOTE — PLAN OF CARE
Patient transferred to Phase II.  Report to LAITH Gudino.  Released from Anesthesia.  Text  to let him know to return to hospital. No c/o pain/nausea.  VSS.   NAD noted

## 2019-10-02 NOTE — TRANSFER OF CARE
"Anesthesia Transfer of Care Note    Patient: Nancy Muñoz    Procedure(s) Performed: Procedure(s) (LRB):  CYSTOURETEROSCOPY, WITH RETROGRADE PYELOGRAM AND URETERAL STENT INSERTION (Right)  CYSTOSCOPY, WITH CALCULUS REMOVAL (Right)  LITHOTRIPSY, USING LASER (Right)    Patient location: PACU    Anesthesia Type: general    Transport from OR: Transported from OR on 2-3 L/min O2 by NC with adequate spontaneous ventilation    Post pain: adequate analgesia    Post assessment: no apparent anesthetic complications    Post vital signs: stable    Level of consciousness: awake    Nausea/Vomiting: no nausea/vomiting    Complications: none    Transfer of care protocol was followed      Last vitals:   Visit Vitals  BP (!) 107/57   Pulse 87   Temp 36.9 °C (98.5 °F) (Oral)   Resp 14   Ht 5' 3" (1.6 m)   Wt 68.9 kg (152 lb)   LMP 01/27/2002 (Approximate)   SpO2 96%   Breastfeeding? No   BMI 26.93 kg/m²     "

## 2019-10-02 NOTE — TELEPHONE ENCOUNTER
----- Message from Niharika Collier MD sent at 10/2/2019 12:18 PM CDT -----  Cath urine next tues prior to urs following wed

## 2019-10-02 NOTE — DISCHARGE INSTRUCTIONS
VERY IMPORTANT - PLEASE READ    Your urologist is Dr.Jennifer Collier  Office number: [phone] ([facility])  Address: [address] [place] [address],  (2nd office building on left); [address] (located on 2nd floor).     What  did today: found some large stones in the bottom part of your right kidney. Broke them up with a laser then removed some fragments. However you also have a large pocket hiding stone in it in a very difficult to reach location. Decided to proceed in two weeks because it was diffcult to see from all the stone fragments and blood (expected).  Will return in 2 weeks after everything is healed again to go try to find the pocket, get into the pocket, break up the stones in the pocket then remove them. 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     If you do not hear from us please call clinic to make a post-op appointment.   The following are specific instructions for you, so please read:    percocet 10/325 dispense 15 to take for pain.   Alternate with toradol/ketorolac    ketorolac/toradol   Finish cipro    bactrim and start next Thursday    Ok to restart any blood thinners   Go to ER with any fevers or chills and call office on way   Return next week to office for cath urine for urinalysis and culture    Return for stone extraction of remaining stones that we can reach in 2 weeks on October 16      The ureter is the tube that drains the kidney (where urine is made). It connects the kidney to the bladder (where urine is stored).     A ureteral stent is a is a soft plastic tube with holes in tube that bypasses anything that obstructs (stone, tumor, scar tissue) the urine from flow from the kidney to the bladder. It is placed by going through the urethra and into the bladder. No incisions are made. Its temporarily inserted into a ureter to help drain urine into the bladder. One end goes in the kidney. The other end goes in the bladder.  A coil on each end holds the stent in place. The stent cant be seen from outside the body.        You have a ureteral stent in your RIGHT kidney that was placed on 10/02/2019  -the stent can only remain for a maximum of 3 to 6 months. If the stent remains longer than this you can get recurrent urinary tract infections, the stent can have more stones form along it and urine will not be able to drain and you will lose all function of your kidney requiring a major surgery and a big incision to remove the kidney.    You have a stent that was placed for a stone that was stuck and there is still a stone there, then expect the following:  -you will return in 2-3 weeks to have the stone removed with smaller instruments. This is called ureteroscopy and it will be done while you are asleep (under anesthesia).  -currently your scheduled date for ureteroscopy and stone extraction is _Wednesday, October 16TH_____________. However if you were not given a date, then please call our office at [phone] and let them know you still have a stent and a stone and need to know what happens next. Remember the stent cannot stay in indefinitely.  -one week prior to your return procedure we will have you come in for a nurse visit to give a CATHETERIZED urine sample to confirm no infections.  Our nurse should contact you for this or you should already have an appointment (see above). If you do not, call the office unless  told you you would not need to provide a sample.   -you will likely not see  again until the time of your next procedure. However, if you have any questions, please feel free to contact our office and the nurse will be able to leave a message for the doctor to answer any questions you may have.  -I will place another stent after the stone extraction/ureteroscopy that will need to be removed a few days to weeks later depending on how much inflammation there is during the procedure.       If you  do not receive a follow-up date or further instructions on what is to be done next about the stent, it is your responsibility to make sure that you have follow-up to have your stone or stent removed.      A stent CANNOT REMAIN indefinitely in the kidney. It should not remain if possible more than 3 to 6 months maximum (rarely 1 year if it was placed for cancer).      If you do not follow-up to have your stent removed then you can LOSE YOUR KIDNEY FUNCTION ON THAT SIDE, get RECURRENT URINARY TRACT INFECTIONS,and MORE STONES requiring SURGICAL OPEN removal of your kidney.     You can call our office ([facility] at [phone] and let them know a stent was placed and you need further instructions for follow-up). If there are issues with insurance we will work with you to help you arrange follow-up where it can be removed. Again,  A STENT CANNOT remain indefinitely. Y      A ureteral stent is left in place for many reasons:  -to keep the ureter open after a procedure as there will be much inflammation and if no stent is left you will experience the same pain as having the stone that caused the obstruction.   -to drain the kidney of infection.  -if the stone is up high, stuck, or you have an infection, the stent will stretch open the small ureter (the tube that drains the kidney) for a few weeks (usually 2 to 4 weeks) so that we can return and place instruments into this tube to break up and remove the stone.    Ureteral Stent Symptoms can include but are not limited to   - pain in the kidney or bladder with urination during or especially at the end of voiding.   - constant urge to urinate, frequency of urination, severe bladder spasms and severe pain the kidney, especially during urination.  - blood in the urine, especially with increased activity. This can last the entire time the stent is in, it can sometimes clear and return or you may never have any at all. I recommend increasing fluid intake to  prevent large clots that may be difficult to urinate out.    I wrote for the following medicines to take:  Toradol/ketorolac- this is a stronger form of ibuprofen, you can take up to every 8 hours but stop taking ibuprofen. Too much of this medicine can cause kidney failure. This helps with the inflammation the body is experiencing from the stent. Take this with food.   Norco or percocet - take 1 every 4 to 6 hours for pain not relieved with ibuprofen or Tylenol. If you are taking this regularly you will need to take miralax or stool softeners daily to prevent constipation.   ***Ditropan/Oxybutynin - take as needed once daily for bladder spasms (feels like the urge to urinate). Make sure to take miralax 17g (capful daily) or stool softeners while taking this to prevent constipation.  Flomax/Tamsulosin - take once nightly before bedtime (at least 3 hours apart from other high blood pressure medicines) while the stent is in and for 1 week after stent removed to help relax the tube the stent is in.   Antibiotics - antbiotics were given just before your procedure that will stay in your system for a while.  If you were written for oral antibiotics, take twice a daily. Your doctor will specify amount of days to take.     When to go to ER:   -fever >101.5 or inability to urinate (no urination for >4 hours and bladder pain).   -If you go to the ER with pain, they may check to make sure the stent is in good position with an x-ray or ultrasound but unlikely to do anything else.   -I will let you know when we will plan to have the stent either removed at the ambulatory surgical center as an outpatient procedure while you are awake, which is uncomfortable briefly, but not painful or you will remove it yourself by pulling the strings.      General Information:    1.  Do not drink alcoholic beverages including beer for 24 hours or as long as you are on pain medication..  2.  Do not drive a motor vehicle, operate machinery or  power tools, or signs legal papers for 24 hours or as long as you are on pain medication.   3.  You may experience light-headedness, dizziness, and sleepiness following surgery. Please do not stay alone. A responsible adult should be with you for this 24 hour period.  4.  Go home and rest.    5. Progress slowly to a normal diet unless instructed.  Otherwise, begin with liquids such as soft drinks, then soup and crackers working up to solid foods. Drink plenty of nonalcoholic fluids.  6.  Certain anesthetics and pain medications produce nausea and vomiting in certain       individuals. If nausea becomes a problem at home, call you doctor.    7. A nurse will be calling you sometime after surgery. Do not be alarmed. This is our way of finding out how you are doing.    8. Several times every hour while you are awake, take 2-3 deep breaths and cough. If you had stomach surgery hold a pillow or rolled towel firmly against your stomach before you cough. This will help with any pain the cough might cause.  9. Several times every hour while you are awake, pump and flex your feet 5-6 times and do foot circles. This will help prevent blood clots.    10.Call your doctor for severe pain, bleeding, fever, or signs or symptoms of infection (pain, swelling, redness, foul odor, drainage).      Post op instructions for prevention of DVT  What is deep vein thrombosis?  Deep vein thrombosis (DVT) is the medical term for blood clots in the deep veins of the leg.  These blood clots can be dangerous.  A DVT can block a blood vessel and keep blood from getting where it needs to go.  Another problem is that the clot can travel to other parts of the body such as the lungs.  A clot that travels to the lungs is called a pulmonary embolus (PE) and can cause serious problems with breathing which can lead to death.  Am I at risk for DVT/PE?  If you are not very active, you are at risk of DVT.  Anyone confined to bed, sitting for long periods of  time, recovering from surgery, etc. increases the risk of DVT.  Other risk factors are cancer diagnosis, certain medications, estrogen replacement in any form,older age, obesity, pregnancy, smoking, history of clotting disorders, and dehydration.  How will I know if I have a DVT?   Swelling in the lower leg   Pain   Warmth, redness, hardness or bulging of the vein  If you have any of these symptoms, call your doctors office right away.  Some people will not have any symptoms until the clot moves to the lungs.  What are the symptoms of a PE?   Panting, shortness of breath, or trouble breathing   Sharp, knife-like chest pain when you breathe   Coughing or coughing up blood   Rapid heartbeat  If you have any of these symptoms or get worse quickly, call 911 for emergency treatment.  How can I prevent a DVT?   Avoid long periods of inactivity and dont cross your legs--get up and walk around every hour or so.   Stay active--walking after surgery is highly encouraged.  This means you should get out of the house and walk in the neighborhood.  Going up and down stairs will not impair healing (unless advised against such activity by your doctor).     Drink plenty of noncaffeinated, nonalcoholic fluids each day to prevent dehydration.   Wear special support stockings, if they have been advised by your doctor.   If you travel, stop at least once an hour and walk around.   Avoid smoking (assistance with stopping is available through your healthcare provider)  Always notify your doctor if you are not able to follow the post operative instructions that are given to you at the time of discharge.  It may be necessary to prescribe one of the medications available to prevent DVT.      Discharge Instructions: After Your Surgery/Procedure  Youve just had surgery. During surgery you were given medicine called anesthesia to keep you relaxed and free of pain. After surgery you may have some pain or nausea. This is common.  "Here are some tips for feeling better and getting well after surgery.     Stay on schedule with your medication.   Going home  Your doctor or nurse will show you how to take care of yourself when you go home. He or she will also answer your questions. Have an adult family member or friend drive you home.      For your safety we recommend these precaution for the first 24 hours after your procedure:  · Do not drive or use heavy equipment.  · Do not make important decisions or sign legal papers.  · Do not drink alcohol.  · Have someone stay with you, if needed. He or she can watch for problems and help keep you safe.  · Your concentration, balance, coordination, and judgement may be impaired for many hours after anesthesia.  Use caution when ambulating or standing up.     · You may feel weak and "washed out" after anesthesia and surgery.      Subtle residual effects of general anesthesia or sedation with regional / local anesthesia can last more than 24 hours.  Rest for the remainder of the day or longer if your Doctor/Surgeon has advised you to do so.  Although you may feel normal within the first 24 hours, your reflexes and mental ability may be impaired without you realizing it.  You may feel dizzy, lightheaded or sleepy for 24 hours or longer.      Be sure to go to all follow-up visits with your doctor. And rest after your surgery for as long as your doctor tells you to.  Coping with pain  If you have pain after surgery, pain medicine will help you feel better. Take it as told, before pain becomes severe. Also, ask your doctor or pharmacist about other ways to control pain. This might be with heat, ice, or relaxation. And follow any other instructions your surgeon or nurse gives you.  Tips for taking pain medicine  To get the best relief possible, remember these points:  · Pain medicines can upset your stomach. Taking them with a little food may help.  · Most pain relievers taken by mouth need at least 20 to 30 " minutes to start to work.  · Taking medicine on a schedule can help you remember to take it. Try to time your medicine so that you can take it before starting an activity. This might be before you get dressed, go for a walk, or sit down for dinner.  · Constipation is a common side effect of pain medicines. Call your doctor before taking any medicines such as laxatives or stool softeners to help ease constipation. Also ask if you should skip any foods. Drinking lots of fluids and eating foods such as fruits and vegetables that are high in fiber can also help. Remember, do not take laxatives unless your surgeon has prescribed them.  · Drinking alcohol and taking pain medicine can cause dizziness and slow your breathing. It can even be deadly. Do not drink alcohol while taking pain medicine.  · Pain medicine can make you react more slowly to things. Do not drive or run machinery while taking pain medicine.  Your health care provider may tell you to take acetaminophen to help ease your pain. Ask him or her how much you are supposed to take each day. Acetaminophen or other pain relievers may interact with your prescription medicines or other over-the-counter (OTC) drugs. Some prescription medicines have acetaminophen and other ingredients. Using both prescription and OTC acetaminophen for pain can cause you to overdose. Read the labels on your OTC medicines with care. This will help you to clearly know the list of ingredients, how much to take, and any warnings. It may also help you not take too much acetaminophen. If you have questions or do not understand the information, ask your pharmacist or health care provider to explain it to you before you take the OTC medicine.  Managing nausea  Some people have an upset stomach after surgery. This is often because of anesthesia, pain, or pain medicine, or the stress of surgery. These tips will help you handle nausea and eat healthy foods as you get better. If you were on a  special food plan before surgery, ask your doctor if you should follow it while you get better. These tips may help:  · Do not push yourself to eat. Your body will tell you when to eat and how much.  · Start off with clear liquids and soup. They are easier to digest.  · Next try semi-solid foods, such as mashed potatoes, applesauce, and gelatin, as you feel ready.  · Slowly move to solid foods. Dont eat fatty, rich, or spicy foods at first.  · Do not force yourself to have 3 large meals a day. Instead eat smaller amounts more often.  · Take pain medicines with a small amount of solid food, such as crackers or toast, to avoid nausea.     Call your surgeon if  · You still have pain an hour after taking medicine. The medicine may not be strong enough.  · You feel too sleepy, dizzy, or groggy. The medicine may be too strong.  · You have side effects like nausea, vomiting, or skin changes, such as rash, itching, or hives.       If you have obstructive sleep apnea  You were given anesthesia medicine during surgery to keep you comfortable and free of pain. After surgery, you may have more apnea spells because of this medicine and other medicines you were given. The spells may last longer than usual.   At home:  · Keep using the continuous positive airway pressure (CPAP) device when you sleep. Unless your health care provider tells you not to, use it when you sleep, day or night. CPAP is a common device used to treat obstructive sleep apnea.  · Talk with your provider before taking any pain medicine, muscle relaxants, or sedatives. Your provider will tell you about the possible dangers of taking these medicines.  © 8121-7705 [employer]. [address] [place] [address], [place] [address]. All rights reserved. This information is not intended as a substitute for professional medical care. Always follow your healthcare professional's instructions.      We hope your stay was comfortable as you heal now, mend and rest.     For we have enjoyed taking care of you by giving your our best.    And as you get better, by regaining your health and strength;   We count it as a privilege to have served you and hope your time at [facility] was well spent.      Thank  You!!!

## 2019-10-02 NOTE — OP NOTE
[facility]  Operative Note    Date: 10/02/2019    Pre-Op Diagnosis: right lower pole renal stones    Post-Op Diagnosis: same + CALYCEAL diverticulum with stone    Procedure(s) Performed:   Cystosocopy, right retrograde pyelogram  Right rigid ureteroscopy  Right flexible nephroscopy, laser lithotripsy and basket stone extraction  Right stent placement, 6x22 JJ stent without strings  Flouro <1 hour    Request for 22 Modifier due to increased level of difficulty  An additional 40 minutes was spent identifying stone in a difficult to find location in the lower pole. Lasering it into smaller fragments then basketing many of the fragments with several passes. .       Specimen(s): stone from right lower pole    Staff Surgeon: Niharika Collier MD    Anesthesia: General endotracheal anesthesia    Indications: Nancy Muñoz is a 63 y.o. female with staghorn right lower pole stone entering or emanating from diverticulum.     Findings:  In the right lower pole she had a stone extending into a calyceal diverticulum . It looks like it was blocking the mouth and there was separate possible infection stone. The lower pole stone was very hard. Cystoscopy showed no tumors. ICONIC disposable scope used due to location of stone    Estimated Blood Loss: minimal     Drains: 6x22 JJ stent without strings, right   Implants:   Implant Name Type Inv. Item Serial No.  Lot No. LRB No. Used   STENT SET URETERAL 6FR 22CM - [id number]  STENT SET URETERAL 6FR 22CM  [employer] [id number] Right 1       Procedure in detail:  After informed consent was obtained, the patient was brought the the cystoscopy suite and placed in the supine position.  SCDs were applied and working.  GETA was administered.  The patient was then placed in the dorsal lithotomy position and prepped and draped in the usual sterile fashion.      A rigid cystoscope in a 22 Fr sheath was introduced into the patient's urethra.  This  passed easily.  The entire urethra was visualized which showed no strictures or masses.  Formal cystoscopy was performed which revealed no masses lesions suspicious for malignancy.  The UOs were visualized in the normal anatomic position bilaterally.     A right retrograde pyelogram was performed showing a fairly dilated ureter and I felt comfortable proceeding with ureteroscopy.     Rigid ureteroscopy performed first: A rigid ureteroscope was advanced along the wire to mid ureter. No stone seen. A rgp was performed through the ureteroscope with findings as above. An amplatz super stiff was then advanced through the ureteroscope to the kidney using flouro to confirm leaving a supers stiff and sensor tip wire in place.     A 12/14 (first 35cm, then changed to 28cm)  ureteral access sheath was placed over the amplatz. Inner first,  then inner and outer and the inner sheath and amplatz was removed.  A flexible ureteroscope was passed through the outer sheath. The ureteroscope was advanced into the kidney.  A pyeloscopy was performed with findings as above.     A 275  micron laser fiber was passed through the ureteroscope.  The stone was fragmented using the laser.  The laser fiber was removed and a Nitinol tipless basket was introduced through the ureteroscope.      Stone fragments were removed using an Ngage basket.     The ureteroscope and ureteral access sheath was removed keeping the guide wire in place and evaluating the entire ureter for remaining stone fragments    A cystoscope was reinserted and the bladder was irrigated to remove the stone fragments.  The bladder was drained the cystoscope removed keeping the wire in place. The wire was backloaded through the stent using a pusher.    A 6x22 JJ ureteral stent without strings was passed over the wire and up into the renal pelvis using fluoro.  When the coil appeared to be in good position in the kidney and the radio-opaque marker of the pusher was at the  inferior pubis, the wire was removed under continuous fluoro.  Good coils were seen in the kidney and the bladder using fluoro.      The patient tolerated the procedure well and was transferred to the recovery room in stable condition.      Plan:    Plan:  ·  percocet 10/325 dispense 15 to take for pain.  · Alternate with toradol/ketorolac  ·  ketorolac/toradol  · Finish cipro  ·  bactrim and start next Thursday   · Ok to restart any blood thinners  · Go to ER with any fevers or chills and call office on way  · Return next week to office for cath urine for urinalysis and culture   · Return for stone extraction of remaining stones that we can reach in 2 weeks on October 16

## 2019-10-02 NOTE — DISCHARGE SUMMARY
[facility]  Urology  Discharge Note - Short Stay      Patient Name: Nancy Muñoz  MRN: [id number]  Discharge Date and Time:  10/02/2019 12:23 PM  Attending Physician: Niharika Collier,*   Discharging Provider: Niharika Collier MD  Primary Care Physician: Jan Petersen MD    Final Active Diagnoses:    Diagnosis Date Noted POA    Kidney stone [N20.0] 10/02/2019 Yes      Problems Resolved During this Admission:       Final Diagnoses: Same as principal problem.    Hospital Course: Patient was admitted for an outpatient procedure and tolerated the procedure well with no complications.*    Procedure(s) (LRB):  CYSTOURETEROSCOPY, WITH RETROGRADE PYELOGRAM AND URETERAL STENT INSERTION (Right)  CYSTOSCOPY, WITH CALCULUS REMOVAL (Right)  LITHOTRIPSY, USING LASER (Right)     Indwelling Lines/Drains at time of discharge:   Lines/Drains/Airways     Drain                 Ureteral Drain/Stent 10/02/19 1209 Right ureter 6 Fr. less than 1 day                Discharged Condition: good    Disposition: home    Follow Up:      Patient Instructions:      Activity as tolerated       Medications:  Reconciled Home Medications:      Medication List      START taking these medications    ketorolac 10 mg tablet  Commonly known as:  TORADOL  Take 1 tablet (10 mg total) by mouth every 8 (eight) hours as needed for Pain.     sulfamethoxazole-trimethoprim 800-160mg 800-160 mg Tab  Commonly known as:  BACTRIM DS  Take 1 tablet by mouth 2 (two) times daily. for 7 days  Start taking on:  October 10, 2019        CHANGE how you take these medications    * oxyCODONE-acetaminophen  mg per tablet  Commonly known as:  PERCOCET  Take 1 tablet by mouth every 4 (four) hours as needed for Pain.  What changed:  Another medication with the same name was added. Make sure you understand how and when to take each.     * oxyCODONE-acetaminophen  mg per tablet  Commonly known as:  PERCOCET  Take 1 tablet by mouth  every 4 (four) hours as needed for Pain.  What changed:  You were already taking a medication with the same name, and this prescription was added. Make sure you understand how and when to take each.         * This list has 2 medication(s) that are the same as other medications prescribed for you. Read the directions carefully, and ask your doctor or other care provider to review them with you.            CONTINUE taking these medications    aspirin 81 MG EC tablet  Commonly known as:  ECOTRIN  Take 1 tablet (81 mg total) by mouth once daily.     atorvastatin 10 MG tablet  Commonly known as:  LIPITOR  Take 10 mg by mouth once daily.     ciprofloxacin HCl 500 MG tablet  Commonly known as:  CIPRO  Take 1 tablet (500 mg total) by mouth 2 (two) times daily. for 7 days     metoprolol succinate 25 MG 24 hr tablet  Commonly known as:  TOPROL-XL  Take 1 tablet (25 mg total) by mouth once daily. for 30 doses     PROAIR HFA 90 mcg/actuation inhaler  Generic drug:  albuterol  Inhale 2 puffs into the lungs every 6 (six) hours as needed for Wheezing. Rescue     tamsulosin 0.4 mg Cap  Commonly known as:  FLOMAX  Take 1 capsule (0.4 mg total) by mouth after dinner.     triamterene-hydrochlorothiazide 37.5-25 mg 37.5-25 mg per capsule  Commonly known as:  DYAZIDE  Take 1 capsule by mouth once daily.            Discharge Procedure Orders (must include Diet, Follow-up, Activity):   Discharge Procedure Orders (must include Diet, Follow-up, Activity)   Activity as tolerated            Niharika Collier MD  Urology  [facility]

## 2019-10-02 NOTE — ANESTHESIA PREPROCEDURE EVALUATION
10/02/2019  Nancy Muñoz is a 63 y.o., female.    Anesthesia Evaluation    I have reviewed the Patient Summary Reports.    I have reviewed the Nursing Notes.   I have reviewed the Medications.     Review of Systems  Anesthesia Hx:  Denies Family Hx of Anesthesia complications.   Denies Personal Hx of Anesthesia complications.   Cardiovascular:   Hypertension Past MI CAD asymptomatic CABG/stent     Pulmonary:   Asthma    Renal/:   renal calculi    Musculoskeletal:   Arthritis   Spine Disorders: lumbar    Neurological:   Denies Neuromuscular Disease.        Physical Exam  General:  Well nourished    Airway/Jaw/Neck:  Airway Findings: Mouth Opening: Normal Tongue: Normal  General Airway Assessment: Adult  Mallampati: II  TM Distance: Normal, at least 6 cm         Dental:  DENTAL FINDINGS: Normal   Chest/Lungs:  Chest/Lungs Clear    Heart/Vascular:  Heart Findings: Normal            Anesthesia Plan  Type of Anesthesia, risks & benefits discussed:  Anesthesia Type:  general  Patient's Preference:   Intra-op Monitoring Plan: standard ASA monitors  Intra-op Monitoring Plan Comments:   Post Op Pain Control Plan:   Post Op Pain Control Plan Comments:   Induction:   IV  Beta Blocker:  Patient is on a Beta-Blocker and has received one dose within the past 24 hours (No further documentation required).       Informed Consent: Patient understands risks and agrees with Anesthesia plan.  Questions answered. Anesthesia consent signed with patient.  ASA Score: 3     Day of Surgery Review of History & Physical:    H&P update referred to the surgeon.         Ready For Surgery From Anesthesia Perspective.

## 2019-10-03 VITALS
RESPIRATION RATE: 16 BRPM | HEIGHT: 63 IN | TEMPERATURE: 98 F | WEIGHT: 152 LBS | HEART RATE: 84 BPM | OXYGEN SATURATION: 95 % | SYSTOLIC BLOOD PRESSURE: 124 MMHG | BODY MASS INDEX: 26.93 KG/M2 | DIASTOLIC BLOOD PRESSURE: 80 MMHG

## 2019-10-04 LAB
COMPN STONE: NORMAL
SPECIMEN SOURCE: NORMAL
STONE ANALYSIS IR-IMP: NORMAL

## 2019-10-09 ENCOUNTER — CLINICAL SUPPORT (OUTPATIENT)
Dept: UROLOGY | Facility: CLINIC | Age: 64
End: 2019-10-09
Payer: COMMERCIAL

## 2019-10-09 ENCOUNTER — APPOINTMENT (OUTPATIENT)
Dept: LAB | Facility: HOSPITAL | Age: 64
End: 2019-10-09
Attending: UROLOGY
Payer: COMMERCIAL

## 2019-10-09 DIAGNOSIS — N20.0 NEPHROLITHIASIS: Primary | ICD-10-CM

## 2019-10-09 LAB
BACTERIA #/AREA URNS HPF: ABNORMAL /HPF
BILIRUB UR QL STRIP: NEGATIVE
CLARITY UR: CLEAR
COLOR UR: YELLOW
GLUCOSE UR QL STRIP: NEGATIVE
GRAN CASTS #/AREA URNS LPF: 1 /LPF
HGB UR QL STRIP: ABNORMAL
HYALINE CASTS #/AREA URNS LPF: 5 /LPF
KETONES UR QL STRIP: NEGATIVE
LEUKOCYTE ESTERASE UR QL STRIP: ABNORMAL
MICROSCOPIC COMMENT: ABNORMAL
NITRITE UR QL STRIP: NEGATIVE
PH UR STRIP: 7 [PH] (ref 5–8)
PROT UR QL STRIP: ABNORMAL
RBC #/AREA URNS HPF: >100 /HPF (ref 0–4)
SP GR UR STRIP: 1.01 (ref 1–1.03)
URN SPEC COLLECT METH UR: ABNORMAL
UROBILINOGEN UR STRIP-ACNC: NEGATIVE EU/DL
WBC #/AREA URNS HPF: 30 /HPF (ref 0–5)
WBC CLUMPS URNS QL MICRO: ABNORMAL

## 2019-10-09 PROCEDURE — 99499 UNLISTED E&M SERVICE: CPT | Mod: S$GLB,,, | Performed by: UROLOGY

## 2019-10-09 PROCEDURE — 99499 NO LOS: ICD-10-PCS | Mod: S$GLB,,, | Performed by: UROLOGY

## 2019-10-09 PROCEDURE — 51701 INSERT BLADDER CATHETER: CPT | Mod: S$GLB,,, | Performed by: UROLOGY

## 2019-10-09 PROCEDURE — 87086 URINE CULTURE/COLONY COUNT: CPT

## 2019-10-09 PROCEDURE — 51701 PR INSERTION OF NON-INDWELLING BLADDER CATHETERIZATION FOR RESIDUAL UR: ICD-10-PCS | Mod: S$GLB,,, | Performed by: UROLOGY

## 2019-10-09 PROCEDURE — 81000 URINALYSIS NONAUTO W/SCOPE: CPT

## 2019-10-09 NOTE — PROGRESS NOTES
Patient here today for catheterized urine specimen for culture prior to surgery.    Patient draped and prepped in sterile fashion.   10Fr catheter placed into the bladder.   Urine specimen obtained.  Collected 50ml.  Catheter removed.   Specimen prepared for lab .

## 2019-10-11 LAB — BACTERIA UR CULT: NO GROWTH

## 2019-10-15 ENCOUNTER — ANESTHESIA EVENT (OUTPATIENT)
Dept: SURGERY | Facility: HOSPITAL | Age: 64
End: 2019-10-15
Payer: COMMERCIAL

## 2019-10-15 NOTE — H&P
[facility] H&P Note - [place]  Staff: MD Nando    Referring provider and please cc:   No referring provider defined for this encounter.     PCP: Jan Petersen MD    [facility] Utilization: active    Chief Complaint: No chief complaint on file.        Subjective:        HPI: Nancy Muñoz is a 63 y.o. female     -h/o ureteral R requiring extraction around 2011. None since. No family hx of stones.   -She went to [facility] for [facility] and was found to have MI, sent to [facility] for angiogram by , unsuccessful and then underwent a CABG by  around 8/5, currently ASA 81mg. Then developed Gh w/o UTI sx. Never had GH before.  Found to have proteus UTI, ct showed  Large 1.5cm R lower pole stone and 3mm LLP stone.  Denies any flank pain. Cytology 8/28/19 was neg. (+smoker but quit)  -h/o 2 back surgeries last year and 3rd bad disc.   -already on oxycodone 10/325mg written by Dr.Jonathan Schmitz (has a pain contract, last saw him 2 months ago-sees Oct 3rd) takes 3 a day for back pain  -underwent R URS of stone extraction on 10/2/19 In the right lower pole she had a stone extending into a calyceal diverticulum . It looks like it was blocking the mouth and there was separate possible infection stone. The lower pole stone was very hard and was in difficult location complicating and extending length of procedure.     Left a stent with plans to return today for more stone extraction today.   Having nausea    Started bactrim 10/9/19    Urine history:   10/9/19 Ng, cath : 3+leuk/3+bld, >100 rbc/30 wbc  10/2/19 No cx, cath: 2+bld/3+leuk  9/26/19 P.mirablis, pvr by in and out cath: 100cc cloudy  9/4/19  No cx, void: 1+leuk, 19 wbc/8 rbc  8/28/19 P.mirabilis, void: 2+bld/2+leuk, 15 wbc, >100 rbc, cath: 3+ bld/3+leuk  2/23/18 Ng, void: 1+leuk, 38 wbc/2 rbc      REVIEW OF SYSTEMS:  General ROS: no fevers, no chills  Psychological ROS: no depression  Endocrine ROS: no heat or cold  Respiratory ROS:+ SOB  since MI  Cardiovascular ROS: no CP  Gastrointestinal ROS: no abdominal pain, no constipation, no diarrhea, noBRBPR  Musculoskeletal ROS: no muscle pain  Neurological ROS: no headaches  Dermatological ROS: no rashes  HEENT: noglasses, no sinus   ROS: per HPI     PMHx:  Past Medical History:   Diagnosis Date    Allergy     Arthritis     spine-ddd    Asthma     usu allergy related    Back pain     Coronary artery disease 08/2019    cabg scheduled    Diverticulosis     Encounter for blood transfusion     High calcium levels 2015    Hypercholesteremia 2017    Hypertension     dr plunkett & dr pham    Hyperthyroidism     ???    Indigestion 08/2019    slight problem yrs ago    Kidney stone 10/2/2019    Lumbar radiculitis 1/30/2018    MI (myocardial infarction) 08/22/2019    angio done- cabg scheduled    NSTEMI (non-ST elevated myocardial infarction) 8/25/2019    Pain management 08/2019    dr mays ([place])    Renal stone 08/2019    to f/u with urology post op    Renal stone 2015    stone removed    Staph infection 2005    treated       PSHx:  Past Surgical History:   Procedure Laterality Date    ANGIOGRAM, CORONARY, WITH LEFT HEART CATHETERIZATION  8/27/2019    Procedure: Angiogram, Coronary, with Left Heart Cath;  Surgeon: Sean Pham MD;  Location: [facility] CATH/EP LAB;  Service: Cardiology;;    APPENDECTOMY      BACK SURGERY      CERVICAL, LUMBAR    CARDIAC SURGERY      CABG X 3  9-5-19    CHOLECYSTECTOMY  02/28/2018    COLONOSCOPY      x 2    CORONARY ANGIOGRAPHY Left 8/27/2019    Procedure: ANGIOGRAM, CORONARY ARTERY;  Surgeon: Sean Pham MD;  Location: [facility] CATH/EP LAB;  Service: Cardiology;  Laterality: Left;    CORONARY ARTERY BYPASS GRAFT (CABG) N/A 9/5/2019    Procedure: CABG w/ EVH;  Surgeon: Freddy Julien MD;  Location: [facility] OR;  Service: Cardiothoracic;  Laterality: N/A;    CYSTOSCOPY WITH CALCULUS EXTRACTION Right 10/2/2019    Procedure: CYSTOSCOPY,  WITH CALCULUS REMOVAL;  Surgeon: Niharika Collier MD;  Location: [facility] OR;  Service: Urology;  Laterality: Right;    CYSTOURETEROSCOPY WITH RETROGRADE PYELOGRAPHY AND INSERTION OF STENT INTO URETER Right 10/2/2019    Procedure: CYSTOURETEROSCOPY, WITH RETROGRADE PYELOGRAM AND URETERAL STENT INSERTION;  Surgeon: Niharika Collier MD;  Location: [facility] OR;  Service: Urology;  Laterality: Right;    ENDOSCOPIC HARVEST OF VEIN N/A 2019    Procedure: HARVEST-VEIN-ENDOVASCULAR;  Surgeon: Freddy Julien MD;  Location: [facility] OR;  Service: Cardiothoracic;  Laterality: N/A;    LASER LITHOTRIPSY Right 10/2/2019    Procedure: LITHOTRIPSY, USING LASER;  Surgeon: Niharika Collier MD;  Location: [facility] OR;  Service: Urology;  Laterality: Right;    SPINE SURGERY      TONSILLECTOMY      URETEROSCOPY Right 10/2/2019    Procedure: URETEROSCOPY;  Surgeon: Niharika Collier MD;  Location: [facility] OR;  Service: Urology;  Laterality: Right;       Stents/Valves/Foreign Bodies/Cardiac Evaluation/Cardiologist: /  GI: , last colonoscopy:18 polyps,  - was told to come back yearly but hasn't been able to go back    History reviewed. No pertinent family history.   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 No wheezing.   Cardiovascular: No obvious extremity edema. Warm and well perfused.   GI: no palpation of masses. No tenderness. No hepatosplenomegaly to palpation.  Musculoskeletal: normal range of motion of bilateral upper extremities. Normal muscle strength and tone.  Skin: no obvious rashes or lesions. No tightening of skin noted.  Neurologic: CN grossly normal. Normal sensation.   Psychiatric: awake, alert and oriented x 3. Mood and affect normal. Cooperative.    Pelvic exam   In and out cath performed with 100c residual  Levator ani tenderness: none    LABS REVIEW:  Recent Labs   Lab 09/08/19  0345 09/09/19  0417 09/27/19  1505   WBC 9.90 7.97 6.15   Hemoglobin 9.1 L 8.9 L 11.9 L   Hematocrit 27.5 L 26.6 L 36.9 L   Platelets 84 L 105 L 323   ]  Recent Labs   Lab 08/28/19  0343 08/29/19  0325  09/06/19  0429 09/07/19  0442 09/08/19  0345 09/09/19  0417 09/27/19  1505   Sodium 143  143 138  138   < > 143 143 146 H 143 145   Potassium 3.7  3.7 3.5  3.5   < > 3.4 L 4.9 4.4 3.7 4.6   Chloride 110  110 105  105   < > 113 H 112 H 113 H 108 104   CO2 26  26 25  25   < > 21 L 27 29 29 31 H   BUN, Bld 33 H  33 H 37 H  37 H   < > 25 H 36 H 39 H 32 H 22   Creatinine 0.7  0.7 0.9  0.9   < > 0.7 0.7 0.7 0.7 0.9   Glucose 105  105 108  108   < > 173 H 132 H 110 103 110   Calcium 10.6 H  10.6 H 10.6 H  10.6 H   < > 8.5 L 9.7 9.3 9.1 11.2 H   Magnesium 1.8 2.0   < > 2.2 2.1 2.0  --   --    Phosphorus 2.7 3.6   < > 3.2 1.9 L 1.6 L  --   --    Alkaline Phosphatase 92 104  --   --   --   --  46 L  --    Total Protein 6.6 7.5  --   --   --   --  4.9 L  --    Albumin 4.0 4.4  --   --   --   --  2.7 L  --    Total Bilirubin 1.9 H 1.5 H  --   --   --   --  1.6 H  --    AST 21 23  --   --   --   --  20  --    ALT 23 30  --   --   --   --   21  --     < > = values in this interval not displayed.   ]    Lab Results   Component Value Date    HGBA1C 5.2 02/23/2018          Recent Pertinent urologic PATHOLOGY REVIEW:  Stone analysis 10/2/19  40% Magnesium ammonium phosphate (struvite)   40% Calcium phosphate (apatite)   10% Calcium oxalate dihydrate   10% Calcium carbonate     Urine cytology [facility] 8/28/19     Negative for malignanct cells  Acute inflammation  satisfactor for eval but with scant urothelial component  bloody      Recent Pertinent Urologic RADIOGRAPHIC REVIEW: (remainder of images reviewed)  Ctu 8/28/19  CT abdomen without contrast demonstrates bilateral renal calculi with a large staghorn type calculus filling the lower pole calyx of the right kidney (1.5cm).  Early arterial phase and delayed contrast enhanced phases demonstrates no renal masses.    cta 2/23/18  T abdomen without contrast demonstrates bilateral renal calculi with a large staghorn type calculus filling the lower pole calyx of the left kidney.  Early arterial phase and delayed contrast enhanced phases demonstrates no renal masses.    Assessment:       1. Nephrolithiasis          Plan:     There are no diagnoses linked to this encounter.    Here today for extraction of remaining stone burden in R kidney if able to reach  Asa 81mg - stopped 7d ago  No fevers or chills    The patient is scheduled for ureteroscopy. The risks and benefits of ureteroscopy were discussed with the patient in detail.  Consent was obtained.  The risks include but are not limited to burning with urination, bleeding, infection, pain, incomplete fragmentation of the stone, need for further procedures, injury to the kidney, ureter, bladder and need for open surgery.  The patient was informed that they may require a ureteral stent and that stents can cause irritative voiding symptoms.  They also understand that ureteral stents are temporary and must be removed or exchanged in a timely fashion as they can  calcify and make more stones and become difficult to remove. Alternative treatments were also discussed with the patient in detail to include ESWL, percutaneous treatment of the stone, open surgery or observation. Patient understands these risks and has agreed to proceed with surgery.      Plan:  · Has been on flomax for 1 week   · Has been on bactrim since 10/9  · Clearance received from   · Clearance received to write pain meds from     Going out of [facility] next week to [facility] for a week and a half  Understands we may not be able to get all the stone  In a pocket        Clearances from  and pain contract/pain med clearance from  ([phone])          Niharika Collier MD

## 2019-10-16 ENCOUNTER — TELEPHONE (OUTPATIENT)
Dept: UROLOGY | Facility: CLINIC | Age: 64
End: 2019-10-16

## 2019-10-16 ENCOUNTER — HOSPITAL ENCOUNTER (OUTPATIENT)
Facility: HOSPITAL | Age: 64
Discharge: HOME | End: 2019-10-16
Attending: UROLOGY | Admitting: UROLOGY
Payer: COMMERCIAL

## 2019-10-16 ENCOUNTER — ANESTHESIA (OUTPATIENT)
Dept: SURGERY | Facility: HOSPITAL | Age: 64
End: 2019-10-16
Payer: COMMERCIAL

## 2019-10-16 DIAGNOSIS — N20.0 NEPHROLITHIASIS: ICD-10-CM

## 2019-10-16 LAB
BACTERIA #/AREA URNS HPF: ABNORMAL /HPF
BILIRUB UR QL STRIP: NEGATIVE
CLARITY UR: ABNORMAL
COLOR UR: YELLOW
GLUCOSE UR QL STRIP: NEGATIVE
HGB UR QL STRIP: ABNORMAL
KETONES UR QL STRIP: NEGATIVE
LEUKOCYTE ESTERASE UR QL STRIP: ABNORMAL
MICROSCOPIC COMMENT: ABNORMAL
NITRITE UR QL STRIP: NEGATIVE
PH UR STRIP: 6 [PH] (ref 5–8)
PROT UR QL STRIP: ABNORMAL
RBC #/AREA URNS HPF: >100 /HPF (ref 0–4)
SP GR UR STRIP: 1.02 (ref 1–1.03)
SQUAMOUS #/AREA URNS HPF: 1 /HPF
URATE CRY URNS QL MICRO: ABNORMAL
URN SPEC COLLECT METH UR: ABNORMAL
UROBILINOGEN UR STRIP-ACNC: NEGATIVE EU/DL
WBC #/AREA URNS HPF: 15 /HPF (ref 0–5)

## 2019-10-16 PROCEDURE — 37000009 HC ANESTHESIA EA ADD 15 MINS: Performed by: UROLOGY

## 2019-10-16 PROCEDURE — C2617 STENT, NON-COR, TEM W/O DEL: HCPCS | Performed by: UROLOGY

## 2019-10-16 PROCEDURE — 63600175 PHARM REV CODE 636 W HCPCS: Performed by: NURSE ANESTHETIST, CERTIFIED REGISTERED

## 2019-10-16 PROCEDURE — 71000039 HC RECOVERY, EACH ADD'L HOUR: Performed by: UROLOGY

## 2019-10-16 PROCEDURE — D9220A PRA ANESTHESIA: ICD-10-PCS | Mod: CRNA,,, | Performed by: NURSE ANESTHETIST, CERTIFIED REGISTERED

## 2019-10-16 PROCEDURE — 52356 CYSTO/URETERO W/LITHOTRIPSY: CPT | Mod: 22,RT,, | Performed by: UROLOGY

## 2019-10-16 PROCEDURE — 71000033 HC RECOVERY, INTIAL HOUR: Performed by: UROLOGY

## 2019-10-16 PROCEDURE — 74420 PR  X-RAY RETROGRADE PYELOGRAM: ICD-10-PCS | Mod: 26,,, | Performed by: UROLOGY

## 2019-10-16 PROCEDURE — 74420 UROGRAPHY RTRGR +-KUB: CPT | Mod: 26,,, | Performed by: UROLOGY

## 2019-10-16 PROCEDURE — 71000015 HC POSTOP RECOV 1ST HR: Performed by: UROLOGY

## 2019-10-16 PROCEDURE — 87086 URINE CULTURE/COLONY COUNT: CPT

## 2019-10-16 PROCEDURE — D9220A PRA ANESTHESIA: Mod: CRNA,,, | Performed by: NURSE ANESTHETIST, CERTIFIED REGISTERED

## 2019-10-16 PROCEDURE — 36000706: Performed by: UROLOGY

## 2019-10-16 PROCEDURE — 27201423 OPTIME MED/SURG SUP & DEVICES STERILE SUPPLY: Performed by: UROLOGY

## 2019-10-16 PROCEDURE — C1769 GUIDE WIRE: HCPCS | Performed by: UROLOGY

## 2019-10-16 PROCEDURE — 81000 URINALYSIS NONAUTO W/SCOPE: CPT

## 2019-10-16 PROCEDURE — 25000003 PHARM REV CODE 250

## 2019-10-16 PROCEDURE — 99900103 DSU ONLY-NO CHARGE-INITIAL HR (STAT): Performed by: UROLOGY

## 2019-10-16 PROCEDURE — 82365 CALCULUS SPECTROSCOPY: CPT

## 2019-10-16 PROCEDURE — 36000707: Performed by: UROLOGY

## 2019-10-16 PROCEDURE — 25000003 PHARM REV CODE 250: Performed by: ANESTHESIOLOGY

## 2019-10-16 PROCEDURE — 99900104 DSU ONLY-NO CHARGE-EA ADD'L HR (STAT): Performed by: UROLOGY

## 2019-10-16 PROCEDURE — 63600175 PHARM REV CODE 636 W HCPCS: Performed by: UROLOGY

## 2019-10-16 PROCEDURE — 63600175 PHARM REV CODE 636 W HCPCS: Performed by: ANESTHESIOLOGY

## 2019-10-16 PROCEDURE — D9220A PRA ANESTHESIA: ICD-10-PCS | Mod: ANES,,, | Performed by: ANESTHESIOLOGY

## 2019-10-16 PROCEDURE — 52356 PR CYSTO/URETERO W/LITHOTRIPSY: ICD-10-PCS | Mod: 22,RT,, | Performed by: UROLOGY

## 2019-10-16 PROCEDURE — 25000003 PHARM REV CODE 250: Performed by: NURSE ANESTHETIST, CERTIFIED REGISTERED

## 2019-10-16 PROCEDURE — C1894 INTRO/SHEATH, NON-LASER: HCPCS | Performed by: UROLOGY

## 2019-10-16 PROCEDURE — D9220A PRA ANESTHESIA: Mod: ANES,,, | Performed by: ANESTHESIOLOGY

## 2019-10-16 PROCEDURE — 37000008 HC ANESTHESIA 1ST 15 MINUTES: Performed by: UROLOGY

## 2019-10-16 DEVICE — STENT SET URETERAL 6X24CM: Type: IMPLANTABLE DEVICE | Site: URETER | Status: FUNCTIONAL

## 2019-10-16 RX ORDER — FENTANYL CITRATE 50 UG/ML
25 INJECTION, SOLUTION INTRAMUSCULAR; INTRAVENOUS EVERY 5 MIN PRN
Status: DISCONTINUED | OUTPATIENT
Start: 2019-10-16 | End: 2019-10-16 | Stop reason: HOSPADM

## 2019-10-16 RX ORDER — TAMSULOSIN HYDROCHLORIDE 0.4 MG/1
0.4 CAPSULE ORAL
Qty: 30 CAPSULE | Refills: 0 | Status: SHIPPED | OUTPATIENT
Start: 2019-10-16 | End: 2019-11-12 | Stop reason: SDUPTHER

## 2019-10-16 RX ORDER — FENTANYL CITRATE 50 UG/ML
INJECTION, SOLUTION INTRAMUSCULAR; INTRAVENOUS
Status: DISCONTINUED | OUTPATIENT
Start: 2019-10-16 | End: 2019-10-16

## 2019-10-16 RX ORDER — KETAMINE HYDROCHLORIDE 100 MG/ML
INJECTION, SOLUTION INTRAMUSCULAR; INTRAVENOUS
Status: DISCONTINUED | OUTPATIENT
Start: 2019-10-16 | End: 2019-10-16

## 2019-10-16 RX ORDER — OXYCODONE AND ACETAMINOPHEN 10; 325 MG/1; MG/1
1 TABLET ORAL EVERY 4 HOURS PRN
Qty: 15 TABLET | Refills: 0 | Status: SHIPPED | OUTPATIENT
Start: 2019-10-16 | End: 2019-11-12 | Stop reason: ALTCHOICE

## 2019-10-16 RX ORDER — PROPOFOL 10 MG/ML
VIAL (ML) INTRAVENOUS
Status: DISCONTINUED | OUTPATIENT
Start: 2019-10-16 | End: 2019-10-16

## 2019-10-16 RX ORDER — LIDOCAINE HYDROCHLORIDE 10 MG/ML
0.5 INJECTION, SOLUTION EPIDURAL; INFILTRATION; INTRACAUDAL; PERINEURAL ONCE
Status: DISCONTINUED | OUTPATIENT
Start: 2019-10-16 | End: 2019-10-16 | Stop reason: HOSPADM

## 2019-10-16 RX ORDER — OXYCODONE HYDROCHLORIDE 5 MG/1
5 TABLET ORAL ONCE AS NEEDED
Status: COMPLETED | OUTPATIENT
Start: 2019-10-16 | End: 2019-10-16

## 2019-10-16 RX ORDER — MIDAZOLAM HYDROCHLORIDE 1 MG/ML
INJECTION INTRAMUSCULAR; INTRAVENOUS
Status: DISCONTINUED | OUTPATIENT
Start: 2019-10-16 | End: 2019-10-16

## 2019-10-16 RX ORDER — KETOROLAC TROMETHAMINE 10 MG/1
10 TABLET, FILM COATED ORAL EVERY 8 HOURS PRN
Qty: 10 TABLET | Refills: 0 | Status: SHIPPED | OUTPATIENT
Start: 2019-10-16 | End: 2019-10-21

## 2019-10-16 RX ORDER — SODIUM CHLORIDE, SODIUM LACTATE, POTASSIUM CHLORIDE, CALCIUM CHLORIDE 600; 310; 30; 20 MG/100ML; MG/100ML; MG/100ML; MG/100ML
INJECTION, SOLUTION INTRAVENOUS CONTINUOUS
Status: DISCONTINUED | OUTPATIENT
Start: 2019-10-16 | End: 2019-10-16 | Stop reason: HOSPADM

## 2019-10-16 RX ORDER — ONDANSETRON 2 MG/ML
4 INJECTION INTRAMUSCULAR; INTRAVENOUS ONCE AS NEEDED
Status: DISCONTINUED | OUTPATIENT
Start: 2019-10-16 | End: 2019-10-16 | Stop reason: HOSPADM

## 2019-10-16 RX ORDER — LIDOCAINE HCL/PF 100 MG/5ML
SYRINGE (ML) INTRAVENOUS
Status: DISCONTINUED | OUTPATIENT
Start: 2019-10-16 | End: 2019-10-16

## 2019-10-16 RX ORDER — ONDANSETRON HYDROCHLORIDE 2 MG/ML
INJECTION, SOLUTION INTRAMUSCULAR; INTRAVENOUS
Status: DISCONTINUED | OUTPATIENT
Start: 2019-10-16 | End: 2019-10-16

## 2019-10-16 RX ORDER — DEXAMETHASONE SODIUM PHOSPHATE 4 MG/ML
INJECTION, SOLUTION INTRA-ARTICULAR; INTRALESIONAL; INTRAMUSCULAR; INTRAVENOUS; SOFT TISSUE
Status: DISCONTINUED | OUTPATIENT
Start: 2019-10-16 | End: 2019-10-16

## 2019-10-16 RX ORDER — ACETAMINOPHEN 10 MG/ML
INJECTION, SOLUTION INTRAVENOUS
Status: DISCONTINUED | OUTPATIENT
Start: 2019-10-16 | End: 2019-10-16

## 2019-10-16 RX ADMIN — ACETAMINOPHEN 1000 MG: 10 INJECTION, SOLUTION INTRAVENOUS at 09:10

## 2019-10-16 RX ADMIN — FENTANYL CITRATE 50 MCG: 50 INJECTION, SOLUTION INTRAMUSCULAR; INTRAVENOUS at 08:10

## 2019-10-16 RX ADMIN — FENTANYL CITRATE 50 MCG: 50 INJECTION, SOLUTION INTRAMUSCULAR; INTRAVENOUS at 09:10

## 2019-10-16 RX ADMIN — ONDANSETRON 4 MG: 2 INJECTION, SOLUTION INTRAMUSCULAR; INTRAVENOUS at 07:10

## 2019-10-16 RX ADMIN — SODIUM CHLORIDE, SODIUM LACTATE, POTASSIUM CHLORIDE, AND CALCIUM CHLORIDE: .6; .31; .03; .02 INJECTION, SOLUTION INTRAVENOUS at 07:10

## 2019-10-16 RX ADMIN — CEFTRIAXONE 1 G: 1 INJECTION, SOLUTION INTRAVENOUS at 08:10

## 2019-10-16 RX ADMIN — MIDAZOLAM HYDROCHLORIDE 2 MG: 1 INJECTION, SOLUTION INTRAMUSCULAR; INTRAVENOUS at 07:10

## 2019-10-16 RX ADMIN — DEXAMETHASONE SODIUM PHOSPHATE 4 MG: 4 INJECTION, SOLUTION INTRAMUSCULAR; INTRAVENOUS at 08:10

## 2019-10-16 RX ADMIN — PROPOFOL 120 MG: 10 INJECTION, EMULSION INTRAVENOUS at 08:10

## 2019-10-16 RX ADMIN — OXYCODONE HYDROCHLORIDE 5 MG: 5 TABLET ORAL at 11:10

## 2019-10-16 RX ADMIN — LIDOCAINE HYDROCHLORIDE 75 MG: 20 INJECTION, SOLUTION INTRAVENOUS at 08:10

## 2019-10-16 RX ADMIN — KETAMINE HYDROCHLORIDE 20 MG: 100 INJECTION, SOLUTION, CONCENTRATE INTRAMUSCULAR; INTRAVENOUS at 10:10

## 2019-10-16 NOTE — PLAN OF CARE
Dr Almanzar released from pacu when criteria met pain tolerable string attached to r labia Due to void pain controlled see mar  No nausea no emesis  Awake and orient skin w+d  To phase 2

## 2019-10-16 NOTE — PLAN OF CARE
Discharge instructions given to pt and spouse, verbalized understanding. IV removed. Belongings given back to pt. Ambulated to the restroom, voided x1. Discharge via wheelchair.

## 2019-10-16 NOTE — DISCHARGE INSTRUCTIONS
VERY IMPORTANT - PLEASE READ    Your urologist is Dr.Jennifer Collier  Office number: [phone] ([facility])  Address: [address] [place] [address],  (2nd office building on left); [address] (located on 2nd floor).     What  did today: I was able to remove some of the stone in that pocket but it was such a difficult location I could not remove it all. Also it was leading to another separate pocket that may be blocked from this stone that was impossible to reach. When you return in 4 weeks we will discuss how to possibly treat this stone. I left a stent on strings which you can remove and you may continue to pass small stone fragments.     If you do not hear from us please call clinic to make a post-op appointment.   The following are specific instructions for you, so please read:   Remove stent by pulling strings on Monday morning at 6am    percocet 10mg, dispense 15, toradol/ketorolac (dispense 10), flomax/tamsulosin (dispense 30)   Alternate Toradol and Percocet for pain   Take 1 Toradol on 1 Percocet 30 min prior to removing stent   Expect pain after stent removal however if pain persists more than 24-48 hours or if you have fever chills go to ER   Take Flomax for 1 more week after stent is removed   Return in 4 weeks to discuss further treatment for stone remaining in the lower pole, will also proceed with a stone workup at that time  · Will repeat CTRSS prior to f/u to see what remains (no stone in most medial portion)      The ureter is the tube that drains the kidney (where urine is made). It connects the kidney to the bladder (where urine is stored).     A ureteral stent is a is a soft plastic tube with holes in tube that bypasses anything that obstructs (stone, tumor, scar tissue) the urine from flow from the kidney to the bladder. It is placed by going through the urethra and into the bladder. No incisions are made. Its temporarily inserted into a ureter to  help drain urine into the bladder. One end goes in the kidney. The other end goes in the bladder. A coil on each end holds the stent in place. The stent cant be seen from outside the body.        You have a ureteral stent in your RIGHT kidney that was placed on 10/16/2019  -the stent can only remain for a maximum of 3 to 6 months. If the stent remains longer than this you can get recurrent urinary tract infections, the stent can have more stones form along it and urine will not be able to drain and you will lose all function of your kidney requiring a major surgery and a big incision to remove the kidney.     You have a string attached to the stent:  -you can remove the stent ___MONDAY______ at 6am  -it will be attached to the outside of your vagina (if you are a female) or to the penis (if you are male)  -if the stent is partially or accidentally pulled out you will leak urine until the stent is removed because urine is now coming directly from your kidney and bypassing the urethra. Go ahead and remove it if this happens but expect pain for 24 to 48 hours or more after stent removed, especially if it was removed earlier than expected.  -If you have not heard from anyone about when to pull the string and remove the stent, remove it at two weeks after your surgery by pulling the string. DO NOT cut the string.   -When you remove the string, you should see a small plastic tube about the size of a cocktail straw and about 12 inches long attached to a string that looks like dental floss. If you do not see this, please let call the office and let us know.     If you do not receive a follow-up date or further instructions on what is to be done next about the stent, it is your responsibility to make sure that you have follow-up to have your stone or stent removed.      A stent CANNOT REMAIN indefinitely in the kidney. It should not remain if possible more than 3 to 6 months maximum (rarely 1 year if it was placed for  cancer).      If you do not follow-up to have your stent removed then you can LOSE YOUR KIDNEY FUNCTION ON THAT SIDE, get RECURRENT URINARY TRACT INFECTIONS,and MORE STONES requiring SURGICAL OPEN removal of your kidney.     You can call our office ([facility] at [phone] and let them know a stent was placed and you need further instructions for follow-up). If there are issues with insurance we will work with you to help you arrange follow-up where it can be removed. Again,  A STENT CANNOT remain indefinitely. Y      A ureteral stent is left in place for many reasons:  -to keep the ureter open after a procedure as there will be much inflammation and if no stent is left you will experience the same pain as having the stone that caused the obstruction.   -to drain the kidney of infection.  -if the stone is up high, stuck, or you have an infection, the stent will stretch open the small ureter (the tube that drains the kidney) for a few weeks (usually 2 to 4 weeks) so that we can return and place instruments into this tube to break up and remove the stone.    Ureteral Stent Symptoms can include but are not limited to   - pain in the kidney or bladder with urination during or especially at the end of voiding.   - constant urge to urinate, frequency of urination, severe bladder spasms and severe pain the kidney, especially during urination.  - blood in the urine, especially with increased activity. This can last the entire time the stent is in, it can sometimes clear and return or you may never have any at all. I recommend increasing fluid intake to prevent large clots that may be difficult to urinate out.    I wrote for the following medicines to take:  Toradol/ketorolac- this is a stronger form of ibuprofen, you can take up to every 8 hours but stop taking ibuprofen. Too much of this medicine can cause kidney failure. This helps with the inflammation the body is experiencing from the stent. Take  this with food.   Norco - take 1 every 4 to 6 hours for pain not relieved with ibuprofen or Tylenol. If you are taking this regularly you will need to take miralax or stool softeners daily to prevent constipation.   Flomax/Tamsulosin - take once nightly before bedtime (at least 3 hours apart from other high blood pressure medicines) while the stent is in and for 1 week after stent removed to help relax the tube the stent is in.   Antibiotics - antbiotics were given just before your procedure that will stay in your system for a while.  If you were written for oral antibiotics, take twice a daily. Your doctor will specify amount of days to take. finish your antibiotics    When to go to ER:   -fever >101.5 or inability to urinate (no urination for >4 hours and bladder pain).   -If you go to the ER with pain, they may check to make sure the stent is in good position with an x-ray or ultrasound but unlikely to do anything else.   -I will let you know when we will plan to have the stent either removed at the ambulatory surgical center as an outpatient procedure while you are awake, which is uncomfortable briefly, but not painful or you will remove it yourself by pulling the strings.     6 Easy Ways to Prevent Kidney Stones - please read!!!   Taken from: https://www.kidney.org/atoz/content/kidneystones_prevent    Don't Underestimate Your Sweat. Saunas, hot yoga and heavy exercise may be good for your health, but they also may lead to kidney stones. Why? Loss of water through sweating - whether due to these activities or just the heat of summer--leads to less urine production. The more you sweat, the less you urinate, which allows for stone-causing minerals to settle and bond in the kidneys and urinary tract.  Instead: Hydrate with H2O. One of the best measures you can take to avoid kidney stones is to drink plenty of water, leading you to urinate a lot. So, be sure to keep well hydrated, especially when engaging in  "exercise or activities that cause a lot of sweating.    It's Not Just the Oxalate. Oxa-what? Oxalate is naturally found in many foods, including fruits and vegetables, nuts and seeds, grains, legumes, and even chocolate and tea. Some examples of foods that contain high levels of oxalate include: peanuts, rhubarb, spinach, beets, chocolate and sweet potatoes. Moderating intake of these foods may be beneficial for people who form calcium oxalate stones, the leading type of kidney stones. A common misconception is that cutting the oxalate-rich foods in your diet alone will reduce the likelihood of forming calcium oxalate kidney stones. While in theory this might be true, this approach isn't smart from an overall health perspective. Most kidney stones are formed when oxalate binds to calcium while urine is produced by the kidneys.  Instead: Eat and drink calcium and oxalate-rich foods together during a meal. In doing so, oxalate and calcium are more likely to bind to one another in the stomach and intestines before the kidneys begin processing, making it less likely that kidney stones will form.    Calcium is Not the Enemy. But it tends to get a bad rap! Most likely due to its name and composition, many are under the impression that calcium is the main culprit in calcium-oxalate stones. "I still see patients who wonder why they are getting recurring stones despite cutting down on their calcium intake," said Dr. Maradiaga. "I've even had patients say that their doctors told them to reduce their calcium intake." A diet low in calcium actually increases one's risk of developing kidney stones.  Instead: Don't reduce the calcium. Work to cut back on the sodium in your diet and to pair calcium-rich foods with oxalate-rich foods.  It's Not One and Done. Passing a kidney stone is often described as one of the most painful experiences a person can have, but unfortunately, it's not always a one-time event. Studies have shown that " "having even one stone greatly increases your chances of having another. "Most people will want to do anything they can to ensure it doesn't happen again," said Dr. Maradiaga. "Unfortunately, it doesn't seem to be the case that people make the changes they need to after their first stone event." Research conducted by Dr. Maradiaga shows that those with kidney stones do not always heed the advice of their nephrologists and urinary specialists. About 15% of kidney stone patients didn't take prescribed medications and 41% did not follow the nutritional advice that would keep stones from recurring.  Instead: Take action! Without the right medications and diet adjustments, stones can come back, and recurring kidney stones also could be an indicator of other problems, including kidney disease.    When Life Hands You Kidney Stones don't fret. And as the saying goes, "make lemonade." It's important to consider dietary remedies alongside prescription medications. While it may seem easier to just take a pill to fix a medical problem, consider what lifestyle changes will also make a big impact on your health.  Instead: Next time you drive past a lemonade (or limeade) stand, consider your kidneys. Chronic kidney stones are often treated with potassium citrate, but studies have shown that limeade, lemonade and other fruits and juices high in natural citrate offers the same stone-preventing benefits. Beware of the sugar, though, because it can increase kidney stone risk.   Instead, buy sugar-free lemonade, or make your own by mixing lime or lemon juice with water and using a sugar substitute if needed. "We believe that citrate in the urine may prevent the calcium from binding with other constituents that lead to stones," said Dr. Maradiaga. "Also, some evidence suggests that citrate may prevent crystals that are already present from binding with each other, thus preventing them from getting bigger."    Not All Stones are Created Equal. " "In addition to calcium oxalate stones, another common type of kidney stones is uric acid stones. Red meat, organ meats, and shellfish have high concentrations of a natural chemical compound known as purines. "High purine intake leads to a higher production of uric acid and produces a larger acid load for the kidneys to excrete," said Dr. Maradiaga. Higher uric acid excretion leads to lower overall urine pH, which means the urine is more acidic. The high acid concentration of the urine makes it easier for uric acid stones to form.  Instead: To prevent uric acid stones, cut down on high-purine foods such as red meat, organ meats, and shellfish, and follow a healthy diet that contains mostly vegetables and fruits, whole grains, and low fat dairy products. Limit sugar-sweetened foods and drinks, especially those that contain high fructose corn syrup. Limit alcohol because it can increase uric acid levels in the blood and avoid crash diets for the same reason..Eating less animal-based protein and eating more fruits and vegetables will help decrease urine acidity and this will help reduce the chance for stone formation.                      Discharge Instructions: After Your Surgery/Procedure  Youve just had surgery. During surgery you were given medicine called anesthesia to keep you relaxed and free of pain. After surgery you may have some pain or nausea. This is common. Here are some tips for feeling better and getting well after surgery.     Stay on schedule with your medication.   Going home  Your doctor or nurse will show you how to take care of yourself when you go home. He or she will also answer your questions. Have an adult family member or friend drive you home.      For your safety we recommend these precaution for the first 24 hours after your procedure:  · Do not drive or use heavy equipment.  · Do not make important decisions or sign legal papers.  · Do not drink alcohol.  · Have someone stay with you, if " "needed. He or she can watch for problems and help keep you safe.  · Your concentration, balance, coordination, and judgement may be impaired for many hours after anesthesia.  Use caution when ambulating or standing up.     · You may feel weak and "washed out" after anesthesia and surgery.      Subtle residual effects of general anesthesia or sedation with regional / local anesthesia can last more than 24 hours.  Rest for the remainder of the day or longer if your Doctor/Surgeon has advised you to do so.  Although you may feel normal within the first 24 hours, your reflexes and mental ability may be impaired without you realizing it.  You may feel dizzy, lightheaded or sleepy for 24 hours or longer.      Be sure to go to all follow-up visits with your doctor. And rest after your surgery for as long as your doctor tells you to.  Coping with pain  If you have pain after surgery, pain medicine will help you feel better. Take it as told, before pain becomes severe. Also, ask your doctor or pharmacist about other ways to control pain. This might be with heat, ice, or relaxation. And follow any other instructions your surgeon or nurse gives you.  Tips for taking pain medicine  To get the best relief possible, remember these points:  · Pain medicines can upset your stomach. Taking them with a little food may help.  · Most pain relievers taken by mouth need at least 20 to 30 minutes to start to work.  · Taking medicine on a schedule can help you remember to take it. Try to time your medicine so that you can take it before starting an activity. This might be before you get dressed, go for a walk, or sit down for dinner.  · Constipation is a common side effect of pain medicines. Call your doctor before taking any medicines such as laxatives or stool softeners to help ease constipation. Also ask if you should skip any foods. Drinking lots of fluids and eating foods such as fruits and vegetables that are high in fiber can also " help. Remember, do not take laxatives unless your surgeon has prescribed them.  · Drinking alcohol and taking pain medicine can cause dizziness and slow your breathing. It can even be deadly. Do not drink alcohol while taking pain medicine.  · Pain medicine can make you react more slowly to things. Do not drive or run machinery while taking pain medicine.  Your health care provider may tell you to take acetaminophen to help ease your pain. Ask him or her how much you are supposed to take each day. Acetaminophen or other pain relievers may interact with your prescription medicines or other over-the-counter (OTC) drugs. Some prescription medicines have acetaminophen and other ingredients. Using both prescription and OTC acetaminophen for pain can cause you to overdose. Read the labels on your OTC medicines with care. This will help you to clearly know the list of ingredients, how much to take, and any warnings. It may also help you not take too much acetaminophen. If you have questions or do not understand the information, ask your pharmacist or health care provider to explain it to you before you take the OTC medicine.  Managing nausea  Some people have an upset stomach after surgery. This is often because of anesthesia, pain, or pain medicine, or the stress of surgery. These tips will help you handle nausea and eat healthy foods as you get better. If you were on a special food plan before surgery, ask your doctor if you should follow it while you get better. These tips may help:  · Do not push yourself to eat. Your body will tell you when to eat and how much.  · Start off with clear liquids and soup. They are easier to digest.  · Next try semi-solid foods, such as mashed potatoes, applesauce, and gelatin, as you feel ready.  · Slowly move to solid foods. Dont eat fatty, rich, or spicy foods at first.  · Do not force yourself to have 3 large meals a day. Instead eat smaller amounts more often.  · Take pain medicines  with a small amount of solid food, such as crackers or toast, to avoid nausea.     Call your surgeon if  · You still have pain an hour after taking medicine. The medicine may not be strong enough.  · You feel too sleepy, dizzy, or groggy. The medicine may be too strong.  · You have side effects like nausea, vomiting, or skin changes, such as rash, itching, or hives.       If you have obstructive sleep apnea  You were given anesthesia medicine during surgery to keep you comfortable and free of pain. After surgery, you may have more apnea spells because of this medicine and other medicines you were given. The spells may last longer than usual.   At home:  · Keep using the continuous positive airway pressure (CPAP) device when you sleep. Unless your health care provider tells you not to, use it when you sleep, day or night. CPAP is a common device used to treat obstructive sleep apnea.  · Talk with your provider before taking any pain medicine, muscle relaxants, or sedatives. Your provider will tell you about the possible dangers of taking these medicines.  © 2536-2271 [employer] [address] [place] [id number]. All rights reserved. This information is not intended as a substitute for professional medical care. Always follow your healthcare professional's instructions.        General Information:    1.  Do not drink alcoholic beverages including beer for 24 hours or as long as you are on pain medication..  2.  Do not drive a motor vehicle, operate machinery or power tools, or signs legal papers for 24 hours or as long as you are on pain medication.   3.  You may experience light-headedness, dizziness, and sleepiness following surgery. Please do not stay alone. A responsible adult should be with you for this 24 hour period.  4.  Go home and rest.  5. Progress slowly to a normal diet unless instructed.  Otherwise, begin with liquids such as soft drinks, then soup and crackers working up to  solid foods. Drink plenty of nonalcoholic fluids.  6.  Certain anesthetics and pain medications produce nausea and vomiting in certain       individuals. If nausea becomes a problem at home, call you doctor.  7. A nurse will be calling you sometime after surgery. Do not be alarmed. This is our way of finding out how you are doing.  8. Several times every hour while you are awake, take 2-3 deep breaths and cough. If you had stomach surgery hold a pillow or rolled towel firmly against your stomach before you cough. This will help with any pain the cough might cause.  9. Several times every hour while you are awake, pump and flex your feet 5-6 times and do foot circles. This will help prevent blood clots.  10.Call your doctor for severe pain, bleeding, fever, or signs or symptoms of infection (pain, swelling, redness, foul odor, drainage).    Post op instructions for prevention of DVT  What is deep vein thrombosis?  Deep vein thrombosis (DVT) is the medical term for blood clots in the deep veins of the leg.  These blood clots can be dangerous.  A DVT can block a blood vessel and keep blood from getting where it needs to go.  Another problem is that the clot can travel to other parts of the body such as the lungs.  A clot that travels to the lungs is called a pulmonary embolus (PE) and can cause serious problems with breathing which can lead to death.  Am I at risk for DVT/PE?  If you are not very active, you are at risk of DVT.  Anyone confined to bed, sitting for long periods of time, recovering from surgery, etc. increases the risk of DVT.  Other risk factors are cancer diagnosis, certain medications, estrogen replacement in any form,older age, obesity, pregnancy, smoking, history of clotting disorders, and dehydration.  How will I know if I have a DVT?   Swelling in the lower leg   Pain   Warmth, redness, hardness or bulging of the vein  If you have any of these symptoms, call your doctors office right  away.  Some people will not have any symptoms until the clot moves to the lungs.  What are the symptoms of a PE?   Panting, shortness of breath, or trouble breathing   Sharp, knife-like chest pain when you breathe   Coughing or coughing up blood   Rapid heartbeat  If you have any of these symptoms or get worse quickly, call 911 for emergency treatment.  How can I prevent a DVT?   Avoid long periods of inactivity and dont cross your legs--get up and walk around every hour or so.   Stay active--walking after surgery is highly encouraged.  This means you should get out of the house and walk in the neighborhood.  Going up and down stairs will not impair healing (unless advised against such activity by your doctor).     Drink plenty of noncaffeinated, nonalcoholic fluids each day to prevent dehydration.   Wear special support stockings, if they have been advised by your doctor.   If you travel, stop at least once an hour and walk around.   Avoid smoking (assistance with stopping is available through your healthcare provider)  Always notify your doctor if you are not able to follow the post operative instructions that are given to you at the time of discharge.  It may be necessary to prescribe one of the medications available to prevent DVT.    We hope your stay was comfortable as you heal now, mend and rest.    For we have enjoyed taking care of you by giving your our best.    And as you get better, by regaining your health and strength;   We count it as a privilege to have served you and hope your time at [facility] was well spent.      Thank  You!!!

## 2019-10-16 NOTE — TELEPHONE ENCOUNTER
----- Message from Niharika Collier MD sent at 10/16/2019 10:58 AM CDT -----  F/u 4 weeks - double book an am new pt slot if necessary

## 2019-10-16 NOTE — ANESTHESIA POSTPROCEDURE EVALUATION
Anesthesia Post Evaluation    Patient: Nancy Muñoz    Procedure(s) Performed: Procedure(s) (LRB):  CYSTOURETEROSCOPY, WITH RETROGRADE PYELOGRAM AND URETERAL STENT INSERTION (Right)  URETEROSCOPY  CYSTOSCOPY, WITH CALCULUS REMOVAL (Right)  LITHOTRIPSY, USING LASER (Right)    Final Anesthesia Type: general  Patient location during evaluation: PACU  Patient participation: Yes- Able to Participate  Level of consciousness: awake and alert  Post-procedure vital signs: reviewed and stable  Pain management: adequate  Airway patency: patent  PONV status at discharge: No PONV  Anesthetic complications: no      Cardiovascular status: hemodynamically stable  Respiratory status: unassisted and room air  Hydration status: euvolemic  Follow-up not needed.          Vitals Value Taken Time   /77 10/16/2019 12:18 PM   Temp 36.7 °C (98.1 °F) 10/16/2019 11:56 AM   Pulse 86 10/16/2019 12:18 PM   Resp 14 10/16/2019 12:18 PM   SpO2 99 % 10/16/2019 12:18 PM         Event Time     Out of Recovery 11:56:00          Pain/Ellyn Score: Pain Rating Prior to Med Admin: 3 (10/16/2019 11:43 AM)  Pain Rating Post Med Admin: 0 (10/16/2019 11:43 AM)  Ellyn Score: 10 (10/16/2019 11:45 AM)

## 2019-10-16 NOTE — ANESTHESIA PREPROCEDURE EVALUATION
10/16/2019  Nancy Muñoz is a 63 y.o., female.    Pre-op Assessment    I have reviewed the Patient Summary Reports.     I have reviewed the Nursing Notes.   I have reviewed the Medications.     Review of Systems  Anesthesia Hx:  No problems with previous Anesthesia  Denies Family Hx of Anesthesia complications.   Denies Personal Hx of Anesthesia complications.   Social:  Former Smoker    Hematology/Oncology:  Hematology Normal   Oncology Normal     EENT/Dental:EENT/Dental Normal   Cardiovascular:   Hypertension Past MI CAD asymptomatic CABG/stent     Pulmonary:   Asthma    Renal/:   Chronic Renal Disease renal calculi    Musculoskeletal:   Arthritis   Spine Disorders: lumbar    Neurological:   Neuromuscular Disease,    Endocrine:   Hyperthyroidism        Physical Exam  General:  Well nourished    Airway/Jaw/Neck:  Airway Findings: Mouth Opening: Normal Tongue: Normal  General Airway Assessment: Adult  Mallampati: II  TM Distance: Normal, at least 6 cm        Eyes/Ears/Nose:  EYES/EARS/NOSE FINDINGS: Normal   Dental:  DENTAL FINDINGS: Normal   Chest/Lungs:  Chest/Lungs Findings: Clear to auscultation, Normal Respiratory Rate     Heart/Vascular:  Heart Findings: Rate: Normal  Rhythm: Regular Rhythm        Mental Status:  Mental Status Findings:  Cooperative, Alert and Oriented         Anesthesia Plan  Type of Anesthesia, risks & benefits discussed:  Anesthesia Type:  general  Patient's Preference:   Intra-op Monitoring Plan: standard ASA monitors  Intra-op Monitoring Plan Comments:   Post Op Pain Control Plan: IV/PO Opioids PRN and multimodal analgesia  Post Op Pain Control Plan Comments:   Induction:   IV  Beta Blocker:  Patient is on a Beta-Blocker and has received one dose within the past 24 hours (No further documentation required).       Informed Consent: Patient understands risks and agrees with  Anesthesia plan.  Questions answered. Anesthesia consent signed with patient.  ASA Score: 3     Day of Surgery Review of History & Physical:    H&P update referred to the surgeon.         Ready For Surgery From Anesthesia Perspective.

## 2019-10-16 NOTE — PLAN OF CARE
Pre op assessment in progress  Pt  went home and will return   Dr Collier can call him after the surgery with an update per family request if possible

## 2019-10-16 NOTE — TELEPHONE ENCOUNTER
----- Message from Niharika Collier MD sent at 10/16/2019 12:09 PM CDT -----  Will repeat CTRSS prior to f/u to see what remains (no stone in most medial portion)  Please have this scehduled prior to her f/u

## 2019-10-16 NOTE — OP NOTE
[facility] [place]  Operative Note    Date: 10/16/2019    Pre-Op Diagnosis:  Right lower pole renal stone x2 and calyceal diverticula    Post-Op Diagnosis:  Same    Procedure(s) Performed:   Cystoscopy, right retrograde pyelogram  Right stent exchange, 6 x 24 JJ stent with strings  Right rigid ureteroscopy  Right flexible nephroscopy, laser lithotripsy, basket stone extraction  Flouro <1 hour    Request for 22 Modifier due to increased level of difficulty  An additional 90 minutes was spent trying to access a very difficult lower pole stone.  Each time it was very difficult to get into the lower pole then tried to reach her stone which was in a medial calyx and the stone was on the medial aspect of the calyx.  I had to use 2 scopes to try to reach this and still was unable to clear her of stone.    Specimen(s):  Stone fragment    Staff Surgeon: Niharika Collier MD    Anesthesia: General endotracheal anesthesia    Indications: Nancy Muñoz is a 63 y.o. female with left staghorn calculus and difficult location unable to be reached with percutaneous nephrolithotomy here for ureteroscopy of remaining stone burden    Findings:  She had a very a very difficult lower pole stone the medial aspect of the medial calyx in the lower pole.  In addition this was opening into another even more medial calyx that was filled with stone that I would not have been able to reach had a even been able to clear her of stone in the 1st calyx I was in.  Retrograde pyelogram shows this clearly.  Each time it was very difficult to get into the lower pole then tried to reach her stone which was in a medial calyx and the stone was on the medial aspect of the calyx.  I had to use the [place] with a view scope 1st as I was going to be lower pole however this is not kidney get access to the medial aspect and changed the flexible ureteral scope.  Three different baskets were used to try to reach the stone.     Estimated Blood  Loss:  Minimal    Drains:  6 x 24 JJ stent with strings, right  Implants:   Implant Name Type Inv. Item Serial No.  Lot No. LRB No. Used   STENT SET URETERAL [id number] - [id number]  STENT SET URETERAL [id number]  [employer]. [id number] Right 1       Procedure in detail:  After informed consent was obtained, the patient was brought the the cystoscopy suite and placed in the supine position.  SCDs were applied and working.  GETA was administered.  The patient was then placed in the dorsal lithotomy position and prepped and draped in the usual sterile fashion.      A rigid cystoscope in a 22 Fr sheath was introduced into the patient's urethra.  This passed easily.  The entire urethra was visualized which showed no strictures or masses.  Formal cystoscopy was performed which revealed no masses or lesions suspicious for malignancy.  The UOs were visualized in the normal anatomic position bilaterally. The rightUO had mild  inflammation from the indwelling stent.     The right stent was grasped and brought to the meatus and a sensor tip wire was passed through the stent.  The stent was removed leaving the wire in place.    Rigid ureteroscopy performed first: A rigid ureteroscope was advanced along the wire to mid ureter.  No stone seen. A right rgp was performed through the ureteroscope with findings as above. An amplatz super stiff was then advanced through the ureteroscope to the kidney using flouro to confirm leaving a supers stiff and sensor tip wire in place.     A 12/14 35cm  ureteral access sheath was placed over the amplatz. Inner first,  then inner and outer and the inner sheath and amplatz was removed.  A flexible ureteroscope was passed through the outer sheath. The [employer]com lithovue ureteroscope was advanced into the kidney.  A pyeloscopy was performed with findings as above.     I first tried to basket or move the stone with a basket using the Ngage basket. I was unable to move much of the stone and I changed to  the Ncircle tipless basket. A 275  micron laser fiber was passed through the ureteroscope.  Some of the stone that was able to be reached was fragmented using the laser.  The laser fiber was removed.  I was able to remove some stone using the encompass basket. Stone fragments were removed using an Ngage basket. This portion of the procedure took about a 1.5 hours due to the difficult to reach location of the stone.     The ureteroscope and ureteral access sheath was removed keeping the guide wire in place and evaluating the entire ureter for remaining stone fragments  A 6x24 JJ ureteral stent with strings was passed over the wire and up into the renal pelvis using fluoro.  When the coil appeared to be in good position in the kidney and the radio-opaque marker of the pusher was at the inferior pubis, the wire was removed under continuous fluoro.  Good coils were seen in the kidney and the bladder using fluoro.       The strings were attached to the right labia using benzoin and tegaderm.     The patient tolerated the procedure well and was transferred to the recovery room in stable condition.      Plan:    Plan:  · Remove stent by pulling strings on Monday morning at 6am  ·  percocet 10mg, dispense 15, toradol/ketorolac (dispense 10), flomax/tamsulosin (dispense 30)  · Alternate Toradol and Percocet for pain  · Take 1 Toradol on 1 Percocet 30 min prior to removing stent  · Expect pain after stent removal however if pain persists more than 24-48 hours or if you have fever chills go to ER  · Take Flomax for 1 more week after stent is removed  · Return in 4 weeks to discuss further treatment for stone remaining in the lower pole (1cm), will also proceed with a stone workup at that time. Too medial to perc. May try ESWL now that neck is more open, may need to be done with RGP if stone not easily radioopaque.   · Will repeat CTRSS prior to f/u to see what remains (no stone in most medial portion)

## 2019-10-16 NOTE — TRANSFER OF CARE
"Anesthesia Transfer of Care Note    Patient: Nancy Muñoz    Procedure(s) Performed: Procedure(s) (LRB):  CYSTOURETEROSCOPY, WITH RETROGRADE PYELOGRAM AND URETERAL STENT INSERTION (Right)  URETEROSCOPY  CYSTOSCOPY, WITH CALCULUS REMOVAL (Right)  LITHOTRIPSY, USING LASER (Right)    Patient location: PACU    Anesthesia Type: general    Transport from OR: Transported from OR on 2-3 L/min O2 by NC with adequate spontaneous ventilation    Post pain: adequate analgesia    Post assessment: no apparent anesthetic complications and tolerated procedure well    Post vital signs: stable    Level of consciousness: sedated    Nausea/Vomiting: no nausea/vomiting    Complications: none    Transfer of care protocol was followed      Last vitals:   Visit Vitals  BP (!) 101/53   Pulse 73   Temp 36.7 °C (98.1 °F) (Tympanic)   Resp 16   Ht 5' 3.5" (1.613 m)   Wt 67.6 kg (149 lb)   LMP 01/27/2002 (Approximate)   SpO2 97%   Breastfeeding? No   BMI 25.98 kg/m²     "

## 2019-10-16 NOTE — DISCHARGE SUMMARY
[facility]  Urology  Discharge Note - Short Stay      Patient Name: Nancy Muñoz  MRN: [id number]  Discharge Date and Time:  10/16/2019 10:57 AM  Attending Physician: Niharika Collier,*   Discharging Provider: Niharika Collier MD  Primary Care Physician: Jan Petersen MD    Final Active Diagnoses:    Diagnosis Date Noted POA    Nephrolithiasis [N20.0] 10/16/2019 Yes      Problems Resolved During this Admission:       Final Diagnoses: Same as principal problem.    Hospital Course: Patient was admitted for an outpatient procedure and tolerated the procedure well with no complications.*    Procedure(s) (LRB):  CYSTOURETEROSCOPY, WITH RETROGRADE PYELOGRAM AND URETERAL STENT INSERTION (Right)  URETEROSCOPY  CYSTOSCOPY, WITH CALCULUS REMOVAL (Right)  LITHOTRIPSY, USING LASER (Right)     Indwelling Lines/Drains at time of discharge:   Lines/Drains/Airways     Drain                 Ureteral Drain/Stent 10/02/19 1209 Right ureter 6 Fr. 13 days         Ureteral Drain/Stent 10/16/19 1039 Right ureter 6 Fr. less than 1 day                Discharged Condition: good    Disposition: home    Follow Up:      Patient Instructions:   No discharge procedures on file.    Medications:  Reconciled Home Medications:      Medication List      START taking these medications    ketorolac 10 mg tablet  Commonly known as:  TORADOL  Take 1 tablet (10 mg total) by mouth every 8 (eight) hours as needed for Pain.        CONTINUE taking these medications    aspirin 81 MG EC tablet  Commonly known as:  ECOTRIN  Take 1 tablet (81 mg total) by mouth once daily.     atorvastatin 10 MG tablet  Commonly known as:  LIPITOR  Take 10 mg by mouth once daily.     metoprolol succinate 25 MG 24 hr tablet  Commonly known as:  TOPROL-XL  Take 1 tablet (25 mg total) by mouth once daily. for 30 doses     oxyCODONE-acetaminophen  mg per tablet  Commonly known as:  PERCOCET  Take 1 tablet by mouth every 4 (four) hours as  needed for Pain.     PROAIR HFA 90 mcg/actuation inhaler  Generic drug:  albuterol  Inhale 2 puffs into the lungs every 6 (six) hours as needed for Wheezing. Rescue     sulfamethoxazole-trimethoprim 800-160mg 800-160 mg Tab  Commonly known as:  BACTRIM DS  Take 1 tablet by mouth 2 (two) times daily. for 7 days     tamsulosin 0.4 mg Cap  Commonly known as:  FLOMAX  Take 1 capsule (0.4 mg total) by mouth after dinner.     triamterene-hydrochlorothiazide 37.5-25 mg 37.5-25 mg per capsule  Commonly known as:  DYAZIDE  Take 1 capsule by mouth once daily.            Discharge Procedure Orders (must include Diet, Follow-up, Activity):  No discharge procedures on file.         Niharika Collier MD  Urology  [facility]

## 2019-10-17 VITALS
HEART RATE: 86 BPM | OXYGEN SATURATION: 99 % | HEIGHT: 64 IN | WEIGHT: 149 LBS | BODY MASS INDEX: 25.44 KG/M2 | TEMPERATURE: 98 F | RESPIRATION RATE: 14 BRPM | DIASTOLIC BLOOD PRESSURE: 77 MMHG | SYSTOLIC BLOOD PRESSURE: 107 MMHG

## 2019-10-17 LAB — BACTERIA UR CULT: NO GROWTH

## 2019-10-21 LAB
COMPN STONE: NORMAL
SPECIMEN SOURCE: NORMAL
STONE ANALYSIS IR-IMP: NORMAL

## 2019-11-06 NOTE — ANESTHESIA POSTPROCEDURE EVALUATION
Anesthesia Post Evaluation    Patient: Nancy Muñoz    Procedure(s) Performed: Procedure(s) (LRB):  CABG w/ EVH (N/A)  HARVEST-VEIN-ENDOVASCULAR (N/A)    Final Anesthesia Type: general  Patient location during evaluation: ICU  Patient participation: Yes- Able to Participate  Level of consciousness: awake and alert  Post-procedure vital signs: reviewed and stable  Pain management: adequate  Airway patency: patent  PONV status at discharge: No PONV  Anesthetic complications: no      Cardiovascular status: hemodynamically stable  Respiratory status: unassisted, spontaneous ventilation and room air  Hydration status: euvolemic  Follow-up not needed.          Vitals Value Taken Time   /67 9/6/2019  7:00 AM   Temp 37.4 °C (99.4 °F) 9/6/2019  6:00 AM   Pulse 112 9/6/2019  7:39 AM   Resp 20 9/6/2019  7:39 AM   SpO2 100 % 9/6/2019  7:39 AM   Vitals shown include unvalidated device data.      No case tracking events are documented in the log.      Pain/Ellyn Score: Pain Rating Prior to Med Admin: 10 (9/6/2019  4:32 AM)  Pain Rating Post Med Admin: 3 (9/6/2019  5:02 AM)        
stated

## 2019-11-08 ENCOUNTER — HOSPITAL ENCOUNTER (OUTPATIENT)
Dept: RADIOLOGY | Facility: HOSPITAL | Age: 64
Discharge: HOME | End: 2019-11-08
Attending: UROLOGY
Payer: COMMERCIAL

## 2019-11-08 DIAGNOSIS — N20.0 NEPHROLITHIASIS: ICD-10-CM

## 2019-11-08 PROCEDURE — 74176 CT ABD & PELVIS W/O CONTRAST: CPT | Mod: TC

## 2019-11-08 PROCEDURE — 74176 CT RENAL STONE STUDY ABD PELVIS WO: ICD-10-PCS | Mod: 26,,, | Performed by: RADIOLOGY

## 2019-11-08 PROCEDURE — 74176 CT ABD & PELVIS W/O CONTRAST: CPT | Mod: 26,,, | Performed by: RADIOLOGY

## 2019-11-11 ENCOUNTER — TELEPHONE (OUTPATIENT)
Dept: UROLOGY | Facility: CLINIC | Age: 64
End: 2019-11-11

## 2019-11-11 NOTE — TELEPHONE ENCOUNTER
----- Message from Carmelita Christine sent at 11/11/2019  2:02 PM CST -----  Contact: pt [phone]  Call placed to pod patient  Is retuning a call  back to the nurse.

## 2019-11-12 ENCOUNTER — OFFICE VISIT (OUTPATIENT)
Dept: UROLOGY | Facility: CLINIC | Age: 64
End: 2019-11-12
Payer: COMMERCIAL

## 2019-11-12 VITALS
WEIGHT: 151.44 LBS | BODY MASS INDEX: 26.83 KG/M2 | DIASTOLIC BLOOD PRESSURE: 69 MMHG | SYSTOLIC BLOOD PRESSURE: 114 MMHG | HEART RATE: 74 BPM | HEIGHT: 63 IN

## 2019-11-12 DIAGNOSIS — N20.0 NEPHROLITHIASIS: Primary | ICD-10-CM

## 2019-11-12 LAB
BILIRUB SERPL-MCNC: ABNORMAL MG/DL
BLOOD URINE, POC: ABNORMAL
COLOR, POC UA: YELLOW
GLUCOSE UR QL STRIP: ABNORMAL
KETONES UR QL STRIP: ABNORMAL
LEUKOCYTE ESTERASE URINE, POC: ABNORMAL
NITRITE, POC UA: ABNORMAL
PH, POC UA: 7
PROTEIN, POC: ABNORMAL
SPECIFIC GRAVITY, POC UA: 1.02
UROBILINOGEN, POC UA: ABNORMAL

## 2019-11-12 PROCEDURE — 81002 URINALYSIS NONAUTO W/O SCOPE: CPT | Mod: S$GLB,,, | Performed by: UROLOGY

## 2019-11-12 PROCEDURE — 3074F SYST BP LT 130 MM HG: CPT | Mod: CPTII,S$GLB,, | Performed by: UROLOGY

## 2019-11-12 PROCEDURE — 3078F PR MOST RECENT DIASTOLIC BLOOD PRESSURE < 80 MM HG: ICD-10-PCS | Mod: CPTII,S$GLB,, | Performed by: UROLOGY

## 2019-11-12 PROCEDURE — 99999 PR PBB SHADOW E&M-EST. PATIENT-LVL IV: ICD-10-PCS | Mod: PBBFAC,,, | Performed by: UROLOGY

## 2019-11-12 PROCEDURE — 81002 POCT URINE DIPSTICK WITHOUT MICROSCOPE: ICD-10-PCS | Mod: S$GLB,,, | Performed by: UROLOGY

## 2019-11-12 PROCEDURE — 1101F PR PT FALLS ASSESS DOC 0-1 FALLS W/OUT INJ PAST YR: ICD-10-PCS | Mod: CPTII,S$GLB,, | Performed by: UROLOGY

## 2019-11-12 PROCEDURE — 3074F PR MOST RECENT SYSTOLIC BLOOD PRESSURE < 130 MM HG: ICD-10-PCS | Mod: CPTII,S$GLB,, | Performed by: UROLOGY

## 2019-11-12 PROCEDURE — 99999 PR PBB SHADOW E&M-EST. PATIENT-LVL IV: CPT | Mod: PBBFAC,,, | Performed by: UROLOGY

## 2019-11-12 PROCEDURE — 3008F BODY MASS INDEX DOCD: CPT | Mod: CPTII,S$GLB,, | Performed by: UROLOGY

## 2019-11-12 PROCEDURE — 99215 OFFICE O/P EST HI 40 MIN: CPT | Mod: 25,S$GLB,, | Performed by: UROLOGY

## 2019-11-12 PROCEDURE — 99215 PR OFFICE/OUTPT VISIT, EST, LEVL V, 40-54 MIN: ICD-10-PCS | Mod: 25,S$GLB,, | Performed by: UROLOGY

## 2019-11-12 PROCEDURE — 1101F PT FALLS ASSESS-DOCD LE1/YR: CPT | Mod: CPTII,S$GLB,, | Performed by: UROLOGY

## 2019-11-12 PROCEDURE — 3078F DIAST BP <80 MM HG: CPT | Mod: CPTII,S$GLB,, | Performed by: UROLOGY

## 2019-11-12 PROCEDURE — 3008F PR BODY MASS INDEX (BMI) DOCUMENTED: ICD-10-PCS | Mod: CPTII,S$GLB,, | Performed by: UROLOGY

## 2019-11-12 RX ORDER — TAMSULOSIN HYDROCHLORIDE 0.4 MG/1
0.4 CAPSULE ORAL
Qty: 30 CAPSULE | Refills: 0 | Status: ON HOLD | OUTPATIENT
Start: 2019-11-12 | End: 2019-12-18 | Stop reason: SDUPTHER

## 2019-11-12 NOTE — PATIENT INSTRUCTIONS
She still has RLP stones x 2 (one I think at the mouth of the diverticulum and the other within), both  Measuring about 1cm each. Too difficult to access retrograde. Will try to shock the most medial stone which I think is at the mouth then plan for inversion therapy after. May work now since stone proximal to this no longer present.  -scheduled for wed dec 18th for R ESWL and cysotscopy possible right retrograde pyelogram (may help identify mouth, review retrogrades again). 10.5cm SSD, 731 HU.   -may need tx of medial stone first (since obstructing)and lateral stone later   -needs to stop asa 7days beforehand, had an MI in august then had a triple bypass. We need clearance to stop the aspirin from    -return for a nurse visit 10 days prior for urinalysis and culture  -discontinue flomax today, start 7 days prior to procedure.  -no new labs needed    Still needs:   Stone workup but cannot have done now bc going out of town for next 3 weeks  Still has left renal stones (small)    The patient is scheduled for ESWL. The risks and benefits of extracorporeal shock wave lithotripsy were discussed with the patient in detail.  Consent was obtained.  The risks include but are not limited to bleeding in or around the kidney, infection, pain, incomplete fragmentation of the stone requiring a repeat treatment or a different form of treatment, obstruction of the kidney by stone particles possibly requiring a ureteral stent or nephrostomy tube, injury to or loss of the kidney ,injury to the ureter or bladder, need for further procedures, hypertension (transient or permanent), recurrence of stones, and need for open surgery.  Alternative treatments were also discussed with the patient in detail to include ureteroscopy, percutaneous treatment of the stone, open surgery  and observation. Patient understands these risks and has agreed to proceed with surgery.

## 2019-11-12 NOTE — PROGRESS NOTES
[facility] Note - [place]  Staff: MD Nando    Referring provider and please cc:   No referring provider defined for this encounter.     PCP: Jan Petersen MD    [facility] Utilization: active    Chief Complaint:   Chief Complaint   Patient presents with    Follow-up     4 week         Subjective:        HPI: Nancy Muñoz is a 64 y.o. female     -h/o ureteral R requiring extraction around 2011. None since. No family hx of stones.   -She went to [facility] for CP and was found to have MI, sent to [facility] for angiogram by , unsuccessful and then underwent a CABG by  around 8/5, currently ASA 81mg. Then developed Gh w/o UTI sx. Never had GH before.  Found to have proteus UTI, ct showed  Large 1.5cm R lower pole stone and 3mm LLP stone.  Denies any flank pain. Cytology 8/28/19 was neg. (+smoker but quit)  -h/o 2 back surgeries last year and 3rd bad disc.   -already on oxycodone 10/325mg written by Dr.Jonathan Schmitz (has a pain contract, last saw him 2 months ago-sees Oct 3rd) takes 3 a day for back pain  -underwent R URS of stone extraction on 10/2/19 In the right lower pole she had a stone extending into a calyceal diverticulum . It looks like it was blocking the mouth and there was separate possible infection stone. The lower pole stone was very hard and was in difficult location complicating and extending length of procedure.  -underwent R urs and stone extraction of renal stones on 10/16/19. Very difficult to access lower pole. Removed a lot of stone but there was another stone which was in a medial calyx I could not reach.     Interval history:   Removed stent, no pain  Here to discuss residual stone  ctrss repeat 11/8/19 done to eval residual stone burden showed 2 RLP 7mm stones (one at mouth and one in tic blocked by mouth) and much less stone burden    Birthday today  Retired today     ua void: tr leuk/tr bld-  No sx of uti  Urine history:   10/9/19             Ng, cath : 3+leuk/3+bld, >100 rbc/30 wbc  10/2/19            No cx, cath: 2+bld/3+leuk  9/26/19            P.mirablis, pvr by in and out cath: 100cc cloudy  9/4/19              No cx, void: 1+leuk, 19 wbc/8 rbc  8/28/19            P.mirabilis, void: 2+bld/2+leuk, 15 wbc, >100 rbc, cath: 3+ bld/3+leuk  2/23/18            Ng, void: 1+leuk, 38 wbc/2 rbc      REVIEW OF SYSTEMS:  General ROS: no fevers, no chills  Psychological ROS: no depression  Endocrine ROS: no heat or cold  Respiratory ROS:+ SOB since MI  Cardiovascular ROS: no CP  Gastrointestinal ROS: no abdominal pain, no constipation, no diarrhea, noBRBPR  Musculoskeletal ROS: no muscle pain  Neurological ROS: no headaches  Dermatological ROS: no rashes  HEENT: noglasses, no sinus   ROS: per HPI     PMHx:  Past Medical History:   Diagnosis Date    Allergy     Arthritis     spine-ddd    Asthma     usu allergy related    Back pain     Coronary artery disease 08/2019    cabg scheduled    Diverticulosis     Encounter for blood transfusion     High calcium levels 2015    Hypercholesteremia 2017    Hypertension     dr plunkett & dr pham    Hyperthyroidism     ???    Indigestion 08/2019    slight problem yrs ago    Kidney stone 10/2/2019    Lumbar radiculitis 1/30/2018    MI (myocardial infarction) 08/22/2019    angio done- cabg scheduled    NSTEMI (non-ST elevated myocardial infarction) 8/25/2019    Pain management 08/2019    dr mays ([place])    Renal stone 08/2019    to f/u with urology post op    Renal stone 2015    stone removed    Staph infection 2005    treated       PSHx:  Past Surgical History:   Procedure Laterality Date    ANGIOGRAM, CORONARY, WITH LEFT HEART CATHETERIZATION  8/27/2019    Procedure: Angiogram, Coronary, with Left Heart Cath;  Surgeon: Sean Pham MD;  Location: [facility] CATH/EP LAB;  Service: Cardiology;;    APPENDECTOMY      BACK SURGERY      CERVICAL, LUMBAR    CARDIAC SURGERY      CABG X 3  9-5-19     CERVICAL SPINE SURGERY  2006    Cervical fusions, titanium cages    CHOLECYSTECTOMY  02/28/2018    COLONOSCOPY      x 2    CORONARY ANGIOGRAPHY Left 8/27/2019    Procedure: ANGIOGRAM, CORONARY ARTERY;  Surgeon: Sean Pham MD;  Location: [facility] CATH/EP LAB;  Service: Cardiology;  Laterality: Left;    CORONARY ARTERY BYPASS GRAFT (CABG) N/A 9/5/2019    Procedure: CABG w/ EVH;  Surgeon: Freddy Julien MD;  Location: [facility] OR;  Service: Cardiothoracic;  Laterality: N/A;    CYSTOSCOPY WITH CALCULUS EXTRACTION Right 10/2/2019    Procedure: CYSTOSCOPY, WITH CALCULUS REMOVAL;  Surgeon: Niharika Collier MD;  Location: [facility] OR;  Service: Urology;  Laterality: Right;    CYSTOSCOPY WITH CALCULUS EXTRACTION Right 10/16/2019    Procedure: CYSTOSCOPY, WITH CALCULUS REMOVAL;  Surgeon: Niharika Collier MD;  Location: [facility];  Service: Urology;  Laterality: Right;    CYSTOURETEROSCOPY WITH RETROGRADE PYELOGRAPHY AND INSERTION OF STENT INTO URETER Right 10/2/2019    Procedure: CYSTOURETEROSCOPY, WITH RETROGRADE PYELOGRAM AND URETERAL STENT INSERTION;  Surgeon: Niharika Collier MD;  Location: [facility] OR;  Service: Urology;  Laterality: Right;    CYSTOURETEROSCOPY WITH RETROGRADE PYELOGRAPHY AND INSERTION OF STENT INTO URETER Right 10/16/2019    Procedure: CYSTOURETEROSCOPY, WITH RETROGRADE PYELOGRAM AND URETERAL STENT INSERTION;  Surgeon: Niharika Collier MD;  Location: [facility] OR;  Service: Urology;  Laterality: Right;    ENDOSCOPIC HARVEST OF VEIN N/A 9/5/2019    Procedure: HARVEST-VEIN-ENDOVASCULAR;  Surgeon: Freddy Julien MD;  Location: [facility] OR;  Service: Cardiothoracic;  Laterality: N/A;    LASER LITHOTRIPSY Right 10/2/2019    Procedure: LITHOTRIPSY, USING LASER;  Surgeon: Niharika Collier MD;  Location: [facility];  Service: Urology;  Laterality: Right;    LASER LITHOTRIPSY Right 10/16/2019    Procedure: LITHOTRIPSY, USING LASER;  Surgeon: Niharika Collier MD;   Location: [facility] OR;  Service: Urology;  Laterality: Right;    SPINE SURGERY      back    TONSILLECTOMY      URETEROSCOPY Right 10/2/2019    Procedure: URETEROSCOPY;  Surgeon: Niharika Collier MD;  Location: [facility] OR;  Service: Urology;  Laterality: Right;    URETEROSCOPY  10/16/2019    Procedure: URETEROSCOPY;  Surgeon: Niharika Collier MD;  Location: [facility] OR;  Service: Urology;;       Stents/Valves/Foreign Bodies/Cardiac Evaluation/Cardiologist: /  GI: , last colonoscopy:18 polyps, 2017 - was told to come back yearly but hasn't been able to go back    History reviewed. No pertinent family history.   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 No wheezing.   Cardiovascular: No obvious extremity edema. Warm and well perfused.   GI: no palpation of masses. No tenderness. No hepatosplenomegaly to palpation.  Musculoskeletal: normal range of motion of bilateral upper extremities. Normal muscle strength and tone.  Skin: no obvious rashes or lesions. No tightening of skin noted.  Neurologic: CN grossly normal. Normal sensation.   Psychiatric: awake, alert  and oriented x 3. Mood and affect normal. Cooperative.    Pelvic exam   In and out cath performed with 100c residual  Levator ani tenderness: none    LABS REVIEW:  Recent Labs   Lab 09/08/19 0345 09/09/19 0417 09/27/19  1505   WBC 9.90 7.97 6.15   Hemoglobin 9.1 L 8.9 L 11.9 L   Hematocrit 27.5 L 26.6 L 36.9 L   Platelets 84 L 105 L 323   ]  Recent Labs   Lab 08/28/19  0343 08/29/19  0325  09/06/19  0429 09/07/19  0442 09/08/19 0345 09/09/19 0417 09/27/19  1505   Sodium 143  143 138  138   < > 143 143 146 H 143 145   Potassium 3.7  3.7 3.5  3.5   < > 3.4 L 4.9 4.4 3.7 4.6   Chloride 110  110 105  105   < > 113 H 112 H 113 H 108 104   CO2 26  26 25  25   < > 21 L 27 29 29 31 H   BUN, Bld 33 H  33 H 37 H  37 H   < > 25 H 36 H 39 H 32 H 22   Creatinine 0.7  0.7 0.9  0.9   < > 0.7 0.7 0.7 0.7 0.9   Glucose 105  105 108  108   < > 173 H 132 H 110 103 110   Calcium 10.6 H  10.6 H 10.6 H  10.6 H   < > 8.5 L 9.7 9.3 9.1 11.2 H   Magnesium 1.8 2.0   < > 2.2 2.1 2.0  --   --    Phosphorus 2.7 3.6   < > 3.2 1.9 L 1.6 L  --   --    Alkaline Phosphatase 92 104  --   --   --   --  46 L  --    Total Protein 6.6 7.5  --   --   --   --  4.9 L  --    Albumin 4.0 4.4  --   --   --   --  2.7 L  --    Total Bilirubin 1.9 H 1.5 H  --   --   --   --  1.6 H  --    AST 21 23  --   --   --   --  20  --    ALT 23 30  --   --   --   --  21  --     < > = values in this interval not displayed.   ]    Lab Results   Component Value Date    HGBA1C 5.2 02/23/2018          Recent Pertinent urologic PATHOLOGY REVIEW:  Stone analysis 10/2/19  There are approximately 2 stones of the right kidney the largest of which is in the lower pole measuring 7 mm.  Previously a significantly larger lower pole stone of the right kidney with staghorn morphology was present.  The left kidney demonstrates a few small stones measuring up to 4 mm within the lower pole, without significant change.  There is no ureterolithiasis or hydronephrosis.    The  small bowel is normal caliber.  The appendix is normal.  There is moderate sigmoid diverticulosis.    There is moderate calcification of the aorta without aneurysm.  There has been a prior sternotomy.  There has been right unilateral lon and pedicle screw fusion of L5-S1 as well as anterior L5-S1 instrumented fusion.    Urine cytology [facility] 8/28/19     Negative for malignanct cells  Acute inflammation  satisfactor for eval but with scant urothelial component  bloody      Recent Pertinent Urologic RADIOGRAPHIC REVIEW: (remainder of images reviewed)  ctrss 10/16/19  There are approximately 2 stones of the right kidney the largest of which is in the lower pole measuring 7 mm.  Previously a significantly larger lower pole stone of the right kidney with staghorn morphology was present.  The left kidney demonstrates a few small stones measuring up to 4 mm within the lower pole, without significant change.  There is no ureterolithiasis or hydronephrosis.    The small bowel is normal caliber.  The appendix is normal.  There is moderate sigmoid diverticulosis.    There is moderate calcification of the aorta without aneurysm.  There has been a prior sternotomy.  There has been right unilateral lon and pedicle screw fusion of L5-S1 as well as anterior L5-S1 instrumented fusion.    Ctu 8/28/19  CT abdomen without contrast demonstrates bilateral renal calculi with a large staghorn type calculus filling the lower pole calyx of the right kidney (1.5cm).  Early arterial phase and delayed contrast enhanced phases demonstrates no renal masses.    cta 2/23/18  T abdomen without contrast demonstrates bilateral renal calculi with a large staghorn type calculus filling the lower pole calyx of the left kidney.  Early arterial phase and delayed contrast enhanced phases demonstrates no renal masses.    Assessment:       1. Nephrolithiasis          Plan:     Nephrolithiasis  -     POCT URINE DIPSTICK WITHOUT MICROSCOPE      She still has RLP  stones x 2 (one I think at the mouth of the diverticulum and the other within), both  Measuring about 1cm each. Too difficult to access retrograde. Will try to shock the most medial stone which I think is at the mouth then plan for inversion therapy after. May work now since stone proximal to this no longer present.  -scheduled for wed dec 18th for R ESWL and cysotscopy possible right retrograde pyelogram (may help identify mouth, review retrogrades again). 10.5cm SSD, 731 HU.   -may need tx of medial stone first (since obstructing)and lateral stone later   -needs to stop asa 7days beforehand, had an MI in august then had a triple bypass. We need clearance to stop the aspirin from    -return for a nurse visit 10 days prior for urinalysis and culture  -discontinue flomax today, start 7 days prior to procedure.  -no new labs needed  -kub morning of (none prior, visible on kub previously)    Still needs:   Stone workup but cannot have done now bc going out of town for next 3 weeks  Still has left renal stones (small)    The patient is scheduled for ESWL. The risks and benefits of extracorporeal shock wave lithotripsy were discussed with the patient in detail.  Consent was obtained.  The risks include but are not limited to bleeding in or around the kidney, infection, pain, incomplete fragmentation of the stone requiring a repeat treatment or a different form of treatment, obstruction of the kidney by stone particles possibly requiring a ureteral stent or nephrostomy tube, injury to or loss of the kidney ,injury to the ureter or bladder, need for further procedures, hypertension (transient or permanent), recurrence of stones, and need for open surgery.  Alternative treatments were also discussed with the patient in detail to include ureteroscopy, percutaneous treatment of the stone, open surgery  and observation. Patient understands these risks and has agreed to proceed with surgery.     Niharika Collier,  MD

## 2019-11-26 ENCOUNTER — TELEPHONE (OUTPATIENT)
Dept: UROLOGY | Facility: CLINIC | Age: 64
End: 2019-11-26

## 2019-11-27 NOTE — TELEPHONE ENCOUNTER
Pt cleared to stop asa 5d prior t procedure from   Clearance received 11/22/19  Also cleared from cardiac standpoint    Uploaded to chart   eswl on nov 18  Make sure pt knows to stop asa 7d prior   And make sure she coms for cath urien appt on 12/9

## 2019-11-27 NOTE — TELEPHONE ENCOUNTER
----- Message from Niharika Collier MD sent at 11/12/2019  4:25 PM CST -----  Regarding: clearance to stop asa    She still has RLP stones x 2 (one I think at the mouth of the diverticulum and the other within), both  Measuring about 1cm each. Too difficult to access retrograde. Will try to shock the most medial stone which I think is at the mouth then plan for inversion therapy after. May work now since stone proximal to this no longer present.  -scheduled for wed dec 18th for R ESWL and cysotscopy possible right retrograde pyelogram (may help identify mouth, review retrogrades again). 10.5cm SSD, 731 HU.   -may need tx of medial stone first (since obstructing)and lateral stone later   -needs to stop asa 7days beforehand, had an MI in august then had a triple bypass. We need clearance to stop the aspirin from    -return for a nurse visit 10 days prior for urinalysis and culture  -discontinue flomax today, start 7 days prior to procedure.  -no new labs needed    Still needs:   Stone workup but cannot have done now bc going out of town for next 3 weeks  Still has left renal stones (small)    The patient is scheduled for ESWL. The risks and benefits of extracorporeal shock wave lithotripsy were discussed with the patient in detail.  Consent was obtained.  The risks include but are not limited to bleeding in or around the kidney, infection, pain, incomplete fragmentation of the stone requiring a repeat treatment or a different form of treatment, obstruction of the kidney by stone particles possibly requiring a ureteral stent or nephrostomy tube, injury to or loss of the kidney ,injury to the ureter or bladder, need for further procedures, hypertension (transient or permanent), recurrence of stones, and need for open surgery.  Alternative treatments were also discussed with the patient in detail to include ureteroscopy, percutaneous treatment of the stone, open surgery  and observation. Patient  understands these risks and has agreed to proceed with surgery.

## 2019-12-09 ENCOUNTER — HOSPITAL ENCOUNTER (OUTPATIENT)
Dept: PREADMISSION TESTING | Facility: HOSPITAL | Age: 64
Discharge: HOME | End: 2019-12-09
Attending: UROLOGY
Payer: MEDICARE

## 2019-12-09 ENCOUNTER — TELEPHONE (OUTPATIENT)
Dept: UROLOGY | Facility: CLINIC | Age: 64
End: 2019-12-09

## 2019-12-09 ENCOUNTER — CLINICAL SUPPORT (OUTPATIENT)
Dept: UROLOGY | Facility: CLINIC | Age: 64
End: 2019-12-09
Payer: MEDICARE

## 2019-12-09 VITALS — WEIGHT: 150 LBS | HEIGHT: 63 IN | BODY MASS INDEX: 26.58 KG/M2

## 2019-12-09 DIAGNOSIS — N20.0 NEPHROLITHIASIS: Primary | ICD-10-CM

## 2019-12-09 DIAGNOSIS — R82.998 CELLS AND CASTS IN URINE: Primary | ICD-10-CM

## 2019-12-09 DIAGNOSIS — E83.52 HYPERCALCEMIA: ICD-10-CM

## 2019-12-09 LAB
ANION GAP SERPL CALC-SCNC: 8 MMOL/L (ref 8–16)
BASOPHILS # BLD AUTO: 0.02 K/UL (ref 0–0.2)
BASOPHILS NFR BLD: 0.3 % (ref 0–1.9)
BILIRUB UR QL STRIP: NEGATIVE
BUN SERPL-MCNC: 26 MG/DL (ref 8–23)
CALCIUM SERPL-MCNC: 12.3 MG/DL (ref 8.7–10.5)
CHLORIDE SERPL-SCNC: 101 MMOL/L (ref 95–110)
CLARITY UR: ABNORMAL
CO2 SERPL-SCNC: 33 MMOL/L (ref 23–29)
COLOR UR: YELLOW
CREAT SERPL-MCNC: 1.1 MG/DL (ref 0.5–1.4)
DIFFERENTIAL METHOD: ABNORMAL
EOSINOPHIL # BLD AUTO: 0.1 K/UL (ref 0–0.5)
EOSINOPHIL NFR BLD: 1.5 % (ref 0–8)
ERYTHROCYTE [DISTWIDTH] IN BLOOD BY AUTOMATED COUNT: 12.1 % (ref 11.5–14.5)
EST. GFR  (AFRICAN AMERICAN): >60 ML/MIN/1.73 M^2
EST. GFR  (NON AFRICAN AMERICAN): 53 ML/MIN/1.73 M^2
GLUCOSE SERPL-MCNC: 91 MG/DL (ref 70–110)
GLUCOSE UR QL STRIP: NEGATIVE
HCT VFR BLD AUTO: 45.2 % (ref 37–48.5)
HGB BLD-MCNC: 15.5 G/DL (ref 12–16)
HGB UR QL STRIP: NEGATIVE
IMM GRANULOCYTES # BLD AUTO: 0.02 K/UL (ref 0–0.04)
KETONES UR QL STRIP: NEGATIVE
LEUKOCYTE ESTERASE UR QL STRIP: NEGATIVE
LYMPHOCYTES # BLD AUTO: 2.5 K/UL (ref 1–4.8)
LYMPHOCYTES NFR BLD: 37.4 % (ref 18–48)
MCH RBC QN AUTO: 31.1 PG (ref 27–31)
MCHC RBC AUTO-ENTMCNC: 34.3 G/DL (ref 32–36)
MCV RBC AUTO: 91 FL (ref 82–98)
MONOCYTES # BLD AUTO: 0.8 K/UL (ref 0.3–1)
MONOCYTES NFR BLD: 11.3 % (ref 4–15)
NEUTROPHILS # BLD AUTO: 3.3 K/UL (ref 1.8–7.7)
NEUTROPHILS NFR BLD: 49.2 % (ref 38–73)
NITRITE UR QL STRIP: NEGATIVE
NRBC BLD-RTO: 0 /100 WBC
PH UR STRIP: >8 [PH] (ref 5–8)
PLATELET # BLD AUTO: 204 K/UL (ref 150–350)
PMV BLD AUTO: 9.7 FL (ref 9.2–12.9)
POTASSIUM SERPL-SCNC: 3.9 MMOL/L (ref 3.5–5.1)
PROT UR QL STRIP: NEGATIVE
RBC # BLD AUTO: 4.99 M/UL (ref 4–5.4)
SODIUM SERPL-SCNC: 142 MMOL/L (ref 136–145)
SP GR UR STRIP: 1.01 (ref 1–1.03)
URN SPEC COLLECT METH UR: ABNORMAL
UROBILINOGEN UR STRIP-ACNC: NEGATIVE EU/DL
WBC # BLD AUTO: 6.71 K/UL (ref 3.9–12.7)

## 2019-12-09 PROCEDURE — 36415 COLL VENOUS BLD VENIPUNCTURE: CPT

## 2019-12-09 PROCEDURE — 99900104 DSU ONLY-NO CHARGE-EA ADD'L HR (STAT)

## 2019-12-09 PROCEDURE — 99900103 DSU ONLY-NO CHARGE-INITIAL HR (STAT)

## 2019-12-09 PROCEDURE — 99499 NO LOS: ICD-10-PCS | Mod: S$GLB,,, | Performed by: UROLOGY

## 2019-12-09 PROCEDURE — 85025 COMPLETE CBC W/AUTO DIFF WBC: CPT

## 2019-12-09 PROCEDURE — 87086 URINE CULTURE/COLONY COUNT: CPT

## 2019-12-09 PROCEDURE — 99499 UNLISTED E&M SERVICE: CPT | Mod: S$GLB,,, | Performed by: UROLOGY

## 2019-12-09 PROCEDURE — 81003 URINALYSIS AUTO W/O SCOPE: CPT

## 2019-12-09 PROCEDURE — 80048 BASIC METABOLIC PNL TOTAL CA: CPT

## 2019-12-09 RX ORDER — OXYCODONE AND ACETAMINOPHEN 10; 325 MG/1; MG/1
1 TABLET ORAL EVERY 8 HOURS PRN
COMMUNITY

## 2019-12-09 RX ORDER — DIPHENHYDRAMINE HCL 50 MG
25 CAPSULE ORAL EVERY 6 HOURS PRN
COMMUNITY

## 2019-12-09 NOTE — TELEPHONE ENCOUNTER
Spoke with patient she does not see anything for her calcium. She is out of town and will not be back until Sunday. Please advise

## 2019-12-09 NOTE — DISCHARGE INSTRUCTIONS
To confirm, Your doctor has instructed you that surgery is scheduled for: 12/18/2019    Please report to [facility], Registration the morning of surgery. You must check-in and receive a wristband before going to your procedure.    Pre-Op will call the afternoon prior to surgery between 1:00 and 6:00 PM with the final arrival time.  Phone number: [phone]    PLEASE NOTE:  The surgery schedule has many variables which may affect the time of your surgery case.  Family members should be available if your surgery time changes.  Plan to be here the day of your procedure between 4-6 hours.    MEDICATIONS:  TAKE ONLY THESE MEDICATIONS WITH A SMALL SIP OF WATER THE MORNING OF YOUR PROCEDURE:  ALBUTEROL, ATORVASTIN, METOPROLOL, METHOCARBAMOL IF NEEDED, OXYCODONE IF NEEDED      DO NOT TAKE THESE MEDICATIONS 5-7 DAYS PRIOR to your procedure or per your surgeon's request: ASPIRIN, ALEVE, ADVIL, IBUPROFEN, FISH OIL VITAMIN E, HERBALS  (May take Tylenol)    ASPIRIN PER CARDIOLOGIST AND DR. SPAIN    ONLY if you are prescribed any types of blood thinners such as:  Aspirin, Coumadin, Plavix, Pradaxa, Xarelto, Aggrenox, Effient, Eliquis, Savasya, Brilinta, or any other, ask your surgeon whether you should stop taking them and how long before surgery you should stop.  You may also need to verify with the prescribing physician if it is ok to stop your medication.      INSTRUCTIONS IMPORTANT!!  · Do not eat or drink anything between midnight and the time of your procedure- this includes gum, mints, and candy.  · Do not smoke or drink alcoholic beverages 24 hours prior to your procedure.  · Shower the night before AND the morning of your procedure with a Chlorhexidine wash such as Hibiclens or Dial antibacterial soap from the neck down.  Do not get it on your face or in your eyes.  You may use your own shampoo and face wash. This helps your skin to be as bacteria free as possible.    · If you wear contact  lenses, dentures, hearing aids or glasses, bring a container to put them in during surgery and give to a family member for safe keeping.  Please leave all jewelry, piercing's and valuables at home.   · DO NOT remove hair from the surgery site.  Do not shave the incision site unless you are given specific instructions to do so.    · ONLY if you have been diagnosed with sleep apnea please bring your C-PAP machine.  · ONLY if you wear home oxygen please bring your portable oxygen tank the day of your procedure.  · ONLY if you have a history of OPEN HEART SURGERY you will need a clearance from your Cardiologist per Anesthesia.      · ONLY for patients requiring bowel prep, written instructions will be given by your doctor's office.  · ONLY if you have a neuro stimulator, please bring the controller with you the morning of surgery  · ONLY if a type and screen test is needed before surgery, please return: N/A  · If your doctor has scheduled you for an overnight stay, bring a small overnight bag with any personal items you need.  · Make arrangements in advance for transportation home by a responsible adult.  It is not safe to drive a vehicle during the 24 hours after anesthesia.      · Visiting hours are 10:00AM to 8:30PM.  For the safety of all patients, visitors under the age of 12 are not allowed above the first floor of the hospital.    · All [facility] facilities and properties are tobacco free.  Smoking is NOT allowed.       If you have any questions about these instructions, call Pre-Op Admit  Nursing at [phone] or the Pre-Op Day Surgery Unit at [phone].

## 2019-12-09 NOTE — TELEPHONE ENCOUNTER
Patient in clinic today for catheterized urine sample. Patient states if he she needs antibiotics prior to cystoscopy to send to [facility] updated in [employer] due to she will be out of town and this will be the closest pharmacy.

## 2019-12-09 NOTE — TELEPHONE ENCOUNTER
Her procedure is scheduled for next wed dec 18. Could she do the Monday or tues before (labs and 24 hour urine?) she could call for the kit now. You could give her number

## 2019-12-09 NOTE — OR NURSING
Results for MACK TRIANA (MRN [id number]) as of 12/9/2019 13:18   Ref. Range 12/9/2019 11:13   Calcium Latest Ref Range: 8.7 - 10.5 mg/dL 12.3 ()     Informed Susan Clifford with Dr. Collier to let Dr. Collier know.

## 2019-12-09 NOTE — TELEPHONE ENCOUNTER
Please let pt know her calcium was 12.3 (she's had this since 2015)  See who she sees for this  She has not had a parathyroid hormone checked since 2018 when it was 193.  I will need to repeat a bmp, pth and vit D   Can she have this done this week  Also she needs to do a 24 hour urine. Doesn't seem to have one done  Can she rosales that this week too?

## 2019-12-09 NOTE — PROGRESS NOTES
Per  patient in clinic today for a catheterized urine sample. Urine sent for u/a and urine culture

## 2019-12-09 NOTE — TELEPHONE ENCOUNTER
----- Message from Susan Clifford LPN sent at 12/9/2019 12:11 PM CST -----      ----- Message -----  From: Claritza Stephen RN  Sent: 12/9/2019  12:05 PM CST  To: Niharika Collier MD, #    Lab reported as critical.    Results for MACK TRIANA (MRN [id number]) as of 12/9/2019 12:05    12/9/2019 11:13  Calcium: 12.3 ()

## 2019-12-10 LAB — BACTERIA UR CULT: NO GROWTH

## 2019-12-10 NOTE — TELEPHONE ENCOUNTER
Spoke with patient informed her of recommendations. Patient given number to [employer] to request kit. Order also faxed to [employer]. Patient verbally voiced understanding.

## 2019-12-10 NOTE — TELEPHONE ENCOUNTER
Spoke with patient will do labs on 12/16. Patient states she doesn't know anything about a 24 hour urine.

## 2019-12-10 NOTE — TELEPHONE ENCOUNTER
Please send her the phone number or fax a [employer] order for her so that they can mail to her house asap. If she can collect the urine before her procedure next wed that's better bc we have to wait at least a month after her procedure if not    Please make sure she has a pth, vit D, bmp and uric acid scheduled

## 2019-12-16 ENCOUNTER — LAB VISIT (OUTPATIENT)
Dept: LAB | Facility: HOSPITAL | Age: 64
End: 2019-12-16
Attending: UROLOGY
Payer: MEDICARE

## 2019-12-16 DIAGNOSIS — E83.52 HYPERCALCEMIA: ICD-10-CM

## 2019-12-16 DIAGNOSIS — N20.0 NEPHROLITHIASIS: ICD-10-CM

## 2019-12-16 LAB
ANION GAP SERPL CALC-SCNC: 9 MMOL/L (ref 8–16)
BUN SERPL-MCNC: 33 MG/DL (ref 8–23)
CALCIUM SERPL-MCNC: 11.3 MG/DL (ref 8.7–10.5)
CHLORIDE SERPL-SCNC: 103 MMOL/L (ref 95–110)
CO2 SERPL-SCNC: 31 MMOL/L (ref 23–29)
CREAT SERPL-MCNC: 0.9 MG/DL (ref 0.5–1.4)
EST. GFR  (AFRICAN AMERICAN): >60 ML/MIN/1.73 M^2
EST. GFR  (NON AFRICAN AMERICAN): >60 ML/MIN/1.73 M^2
GLUCOSE SERPL-MCNC: 91 MG/DL (ref 70–110)
POTASSIUM SERPL-SCNC: 3.6 MMOL/L (ref 3.5–5.1)
PTH-INTACT SERPL-MCNC: 163.5 PG/ML (ref 9–77)
SODIUM SERPL-SCNC: 143 MMOL/L (ref 136–145)
URATE SERPL-MCNC: 6.8 MG/DL (ref 2.4–5.7)

## 2019-12-16 PROCEDURE — 84550 ASSAY OF BLOOD/URIC ACID: CPT

## 2019-12-16 PROCEDURE — 83970 ASSAY OF PARATHORMONE: CPT

## 2019-12-16 PROCEDURE — 80048 BASIC METABOLIC PNL TOTAL CA: CPT

## 2019-12-16 PROCEDURE — 82306 VITAMIN D 25 HYDROXY: CPT

## 2019-12-16 PROCEDURE — 36415 COLL VENOUS BLD VENIPUNCTURE: CPT

## 2019-12-17 ENCOUNTER — ANESTHESIA EVENT (OUTPATIENT)
Dept: SURGERY | Facility: HOSPITAL | Age: 64
End: 2019-12-17
Payer: MEDICARE

## 2019-12-17 DIAGNOSIS — N20.0 NEPHROLITHIASIS: Primary | ICD-10-CM

## 2019-12-17 LAB — 25(OH)D3+25(OH)D2 SERPL-MCNC: 16 NG/ML (ref 30–96)

## 2019-12-18 ENCOUNTER — ANESTHESIA (OUTPATIENT)
Dept: SURGERY | Facility: HOSPITAL | Age: 64
End: 2019-12-18
Payer: MEDICARE

## 2019-12-18 ENCOUNTER — HOSPITAL ENCOUNTER (OUTPATIENT)
Facility: HOSPITAL | Age: 64
Discharge: HOME | End: 2019-12-18
Attending: UROLOGY | Admitting: UROLOGY
Payer: MEDICARE

## 2019-12-18 DIAGNOSIS — N20.0 NEPHROLITHIASIS: ICD-10-CM

## 2019-12-18 PROCEDURE — D9220A PRA ANESTHESIA: Mod: ANES,,, | Performed by: ANESTHESIOLOGY

## 2019-12-18 PROCEDURE — D9220A PRA ANESTHESIA: ICD-10-PCS | Mod: ANES,,, | Performed by: ANESTHESIOLOGY

## 2019-12-18 PROCEDURE — 99900103 DSU ONLY-NO CHARGE-INITIAL HR (STAT): Performed by: UROLOGY

## 2019-12-18 PROCEDURE — 37000008 HC ANESTHESIA 1ST 15 MINUTES: Performed by: UROLOGY

## 2019-12-18 PROCEDURE — 37000009 HC ANESTHESIA EA ADD 15 MINS: Performed by: UROLOGY

## 2019-12-18 PROCEDURE — 99900104 DSU ONLY-NO CHARGE-EA ADD'L HR (STAT): Performed by: UROLOGY

## 2019-12-18 PROCEDURE — D9220A PRA ANESTHESIA: Mod: CRNA,,, | Performed by: NURSE ANESTHETIST, CERTIFIED REGISTERED

## 2019-12-18 PROCEDURE — 25000003 PHARM REV CODE 250: Performed by: ANESTHESIOLOGY

## 2019-12-18 PROCEDURE — 63600175 PHARM REV CODE 636 W HCPCS: Performed by: UROLOGY

## 2019-12-18 PROCEDURE — 71000033 HC RECOVERY, INTIAL HOUR: Performed by: UROLOGY

## 2019-12-18 PROCEDURE — 50590 PR FRAGMENT KIDNEY STONE/ ESWL: ICD-10-PCS | Mod: RT,,, | Performed by: UROLOGY

## 2019-12-18 PROCEDURE — 36000705 HC OR TIME LEV I EA ADD 15 MIN: Performed by: UROLOGY

## 2019-12-18 PROCEDURE — 50590 FRAGMENTING OF KIDNEY STONE: CPT | Mod: RT,,, | Performed by: UROLOGY

## 2019-12-18 PROCEDURE — D9220A PRA ANESTHESIA: ICD-10-PCS | Mod: CRNA,,, | Performed by: NURSE ANESTHETIST, CERTIFIED REGISTERED

## 2019-12-18 PROCEDURE — 71000015 HC POSTOP RECOV 1ST HR: Performed by: UROLOGY

## 2019-12-18 PROCEDURE — 36000704 HC OR TIME LEV I 1ST 15 MIN: Performed by: UROLOGY

## 2019-12-18 PROCEDURE — 63600175 PHARM REV CODE 636 W HCPCS: Performed by: NURSE ANESTHETIST, CERTIFIED REGISTERED

## 2019-12-18 PROCEDURE — 27200651 HC AIRWAY, LMA: Performed by: ANESTHESIOLOGY

## 2019-12-18 RX ORDER — AMOXICILLIN AND CLAVULANATE POTASSIUM 875; 125 MG/1; MG/1
1 TABLET, FILM COATED ORAL 2 TIMES DAILY
Qty: 6 TABLET | Refills: 0 | Status: SHIPPED | OUTPATIENT
Start: 2019-12-18 | End: 2019-12-21

## 2019-12-18 RX ORDER — TAMSULOSIN HYDROCHLORIDE 0.4 MG/1
0.4 CAPSULE ORAL
Qty: 30 CAPSULE | Refills: 0 | Status: SHIPPED | OUTPATIENT
Start: 2019-12-18 | End: 2019-12-18 | Stop reason: SDUPTHER

## 2019-12-18 RX ORDER — LIDOCAINE HYDROCHLORIDE 10 MG/ML
1 INJECTION, SOLUTION EPIDURAL; INFILTRATION; INTRACAUDAL; PERINEURAL ONCE
Status: DISCONTINUED | OUTPATIENT
Start: 2019-12-18 | End: 2019-12-18 | Stop reason: HOSPADM

## 2019-12-18 RX ORDER — FENTANYL CITRATE 50 UG/ML
25 INJECTION, SOLUTION INTRAMUSCULAR; INTRAVENOUS EVERY 5 MIN PRN
Status: DISCONTINUED | OUTPATIENT
Start: 2019-12-18 | End: 2019-12-18 | Stop reason: HOSPADM

## 2019-12-18 RX ORDER — TAMSULOSIN HYDROCHLORIDE 0.4 MG/1
CAPSULE ORAL
Qty: 90 CAPSULE | Refills: 0 | Status: SHIPPED | OUTPATIENT
Start: 2019-12-18 | End: 2020-01-16

## 2019-12-18 RX ORDER — ACETAMINOPHEN 10 MG/ML
INJECTION, SOLUTION INTRAVENOUS
Status: DISCONTINUED | OUTPATIENT
Start: 2019-12-18 | End: 2019-12-18

## 2019-12-18 RX ORDER — MIDAZOLAM HYDROCHLORIDE 1 MG/ML
INJECTION, SOLUTION INTRAMUSCULAR; INTRAVENOUS
Status: DISCONTINUED | OUTPATIENT
Start: 2019-12-18 | End: 2019-12-18

## 2019-12-18 RX ORDER — LIDOCAINE HCL/PF 100 MG/5ML
SYRINGE (ML) INTRAVENOUS
Status: DISCONTINUED | OUTPATIENT
Start: 2019-12-18 | End: 2019-12-18

## 2019-12-18 RX ORDER — ONDANSETRON 2 MG/ML
4 INJECTION INTRAMUSCULAR; INTRAVENOUS ONCE AS NEEDED
Status: DISCONTINUED | OUTPATIENT
Start: 2019-12-18 | End: 2019-12-18 | Stop reason: HOSPADM

## 2019-12-18 RX ORDER — PROPOFOL 10 MG/ML
VIAL (ML) INTRAVENOUS
Status: DISCONTINUED | OUTPATIENT
Start: 2019-12-18 | End: 2019-12-18

## 2019-12-18 RX ORDER — PHENYLEPHRINE HYDROCHLORIDE 10 MG/ML
INJECTION INTRAVENOUS
Status: DISCONTINUED | OUTPATIENT
Start: 2019-12-18 | End: 2019-12-18

## 2019-12-18 RX ORDER — SODIUM CHLORIDE, SODIUM LACTATE, POTASSIUM CHLORIDE, CALCIUM CHLORIDE 600; 310; 30; 20 MG/100ML; MG/100ML; MG/100ML; MG/100ML
125 INJECTION, SOLUTION INTRAVENOUS CONTINUOUS
Status: DISCONTINUED | OUTPATIENT
Start: 2019-12-18 | End: 2019-12-18 | Stop reason: HOSPADM

## 2019-12-18 RX ORDER — LIDOCAINE HYDROCHLORIDE 20 MG/ML
JELLY TOPICAL CONTINUOUS PRN
Status: DISCONTINUED | OUTPATIENT
Start: 2019-12-18 | End: 2019-12-18

## 2019-12-18 RX ORDER — SODIUM CHLORIDE 0.9 % (FLUSH) 0.9 %
3 SYRINGE (ML) INJECTION EVERY 8 HOURS
Status: DISCONTINUED | OUTPATIENT
Start: 2019-12-18 | End: 2019-12-18 | Stop reason: HOSPADM

## 2019-12-18 RX ORDER — SODIUM CHLORIDE, SODIUM LACTATE, POTASSIUM CHLORIDE, CALCIUM CHLORIDE 600; 310; 30; 20 MG/100ML; MG/100ML; MG/100ML; MG/100ML
10 INJECTION, SOLUTION INTRAVENOUS CONTINUOUS
Status: DISCONTINUED | OUTPATIENT
Start: 2019-12-18 | End: 2019-12-18 | Stop reason: HOSPADM

## 2019-12-18 RX ORDER — ONDANSETRON 2 MG/ML
INJECTION INTRAMUSCULAR; INTRAVENOUS
Status: DISCONTINUED | OUTPATIENT
Start: 2019-12-18 | End: 2019-12-18

## 2019-12-18 RX ORDER — DEXAMETHASONE SODIUM PHOSPHATE 4 MG/ML
INJECTION, SOLUTION INTRA-ARTICULAR; INTRALESIONAL; INTRAMUSCULAR; INTRAVENOUS; SOFT TISSUE
Status: DISCONTINUED | OUTPATIENT
Start: 2019-12-18 | End: 2019-12-18

## 2019-12-18 RX ORDER — OXYCODONE HYDROCHLORIDE 5 MG/1
5 TABLET ORAL ONCE AS NEEDED
Status: COMPLETED | OUTPATIENT
Start: 2019-12-18 | End: 2019-12-18

## 2019-12-18 RX ORDER — FENTANYL CITRATE 50 UG/ML
INJECTION, SOLUTION INTRAMUSCULAR; INTRAVENOUS
Status: DISCONTINUED | OUTPATIENT
Start: 2019-12-18 | End: 2019-12-18

## 2019-12-18 RX ADMIN — DEXAMETHASONE SODIUM PHOSPHATE 4 MG: 4 INJECTION, SOLUTION INTRAMUSCULAR; INTRAVENOUS at 09:12

## 2019-12-18 RX ADMIN — PHENYLEPHRINE HYDROCHLORIDE 100 MCG: 10 INJECTION INTRAVENOUS at 10:12

## 2019-12-18 RX ADMIN — PHENYLEPHRINE HYDROCHLORIDE 200 MCG: 10 INJECTION INTRAVENOUS at 09:12

## 2019-12-18 RX ADMIN — SODIUM CHLORIDE, SODIUM GLUCONATE, SODIUM ACETATE, POTASSIUM CHLORIDE, MAGNESIUM CHLORIDE, SODIUM PHOSPHATE, DIBASIC, AND POTASSIUM PHOSPHATE: .53; .5; .37; .037; .03; .012; .00082 INJECTION, SOLUTION INTRAVENOUS at 07:12

## 2019-12-18 RX ADMIN — FENTANYL CITRATE 50 MCG: 50 INJECTION, SOLUTION INTRAMUSCULAR; INTRAVENOUS at 09:12

## 2019-12-18 RX ADMIN — MIDAZOLAM 2 MG: 1 INJECTION INTRAMUSCULAR; INTRAVENOUS at 09:12

## 2019-12-18 RX ADMIN — PROPOFOL 150 MG: 10 INJECTION, EMULSION INTRAVENOUS at 09:12

## 2019-12-18 RX ADMIN — ACETAMINOPHEN 1000 MG: 10 INJECTION, SOLUTION INTRAVENOUS at 10:12

## 2019-12-18 RX ADMIN — PHENYLEPHRINE HYDROCHLORIDE 100 MCG: 10 INJECTION INTRAVENOUS at 09:12

## 2019-12-18 RX ADMIN — PHENYLEPHRINE HYDROCHLORIDE 100 MCG: 10 INJECTION INTRAVENOUS at 08:12

## 2019-12-18 RX ADMIN — LIDOCAINE HYDROCHLORIDE 75 MG: 20 INJECTION, SOLUTION INTRAVENOUS at 09:12

## 2019-12-18 RX ADMIN — OXYCODONE HYDROCHLORIDE 5 MG: 5 TABLET ORAL at 10:12

## 2019-12-18 RX ADMIN — CEFTRIAXONE 1 G: 1 INJECTION, SOLUTION INTRAVENOUS at 09:12

## 2019-12-18 RX ADMIN — ONDANSETRON 4 MG: 2 INJECTION, SOLUTION INTRAMUSCULAR; INTRAVENOUS at 09:12

## 2019-12-18 NOTE — OP NOTE
[facility]  Operative Note    Date: 12/18/2019    Pre-Op Diagnosis: Right renal stones    Post-Op Diagnosis: same    Procedure(s) Performed:   1.  Right ESWL    Specimen(s): none    Staff Surgeon:  Niharika Collier MD    Anesthesia: Monitored Local Anesthesia with Sedation    Indications: Nancy Muñoz is a 64 y.o. female with a  right renal stone presenting for definitive stone management.  The stone is radio-opaque on KUB.      Findings:   The stones are in the RLP 1cm x 2. More easily seen on flouro table than the kub done earlier today.   By the end of the procedure the stones adjacent to each other appeared fragmented      Estimated Blood Loss: none    Drains: none    Procedure in Detail:  After risk, benefits, and possible complications of ESWL were discussed, the patient elected to undergo the procedure and informed consent was obtained. All questions were answered in the pre-operative area and the patient was transferred to the lithotripsy suite and placed on the lithotriptor table. SCDs were applied and working.  Time out was preformed, angelo-procedural antibiotics were administered.    The patient's right lower pole lateral stone 9but adjacent to other stone)was aligned on the X-Y- and Z axis and right ESWL was begun after general anesthesia was administered.  When the patient was adequately sedated, 3000 thousand shocks were administered at a rate of 2 shocks per second, beginning at 16 KV of power and advanced to 26 KV. Intermittent fluoroscopy was used to monitor fragmentation of the stone.    See above findings for stone appearance at end of procedure.     The patient tolerated the procedure well and was transferred to the PACU in stable condition.      Plan:    kub and rbus in 6 weeks to see if stone still visible   augmentin twice a day for 3 days   Inversion therapy x 3 days, see d/c instructions   Collect fragments but doesn't have to bring   F/u with melsissa in 6  weeks to review 24 hour urine and kub - should have seen endocrine by then. When 24 hour urine comes in, pt needs to be mailed a copy to bring to her endocrine appt.   Percocet prn rx she already has    Follow up with endocrinology for elevated parathyroid hormone     Niharika Collier MD

## 2019-12-18 NOTE — H&P
[facility] H&P Note - [place]  Staff: MD Nando    PCP: Jan Petersen MD    [facility] Utilization: active    Chief Complaint:   No chief complaint on file.        Subjective:        HPI: Nancy Muñoz is a 64 y.o. female     -h/o ureteral R requiring extraction around 2011. None since. No family hx of stones.   -She went to [facility] for CP and was found to have MI, sent to [facility] for angiogram by , unsuccessful and then underwent a CABG by  around 8/5, currently ASA 81mg. Then developed Gh w/o UTI sx. Never had GH before.  Found to have proteus UTI, ct showed  Large 1.5cm R lower pole stone and 3mm LLP stone.  Denies any flank pain. Cytology 8/28/19 was neg. (+smoker but quit)  -already on oxycodone 10/325mg written by Dr.Jonathan Schmitz (has a pain contract, last saw him 2 months ago-sees Oct 3rd) takes 3 a day for back pain  -underwent R URS of stone extraction on 10/2/19 In the right lower pole she had a stone extending into a calyceal diverticulum . It looks like it was blocking the mouth and there was separate possible infection stone. The lower pole stone was very hard and was in difficult location complicating and extending length of procedure.  -underwent R urs and stone extraction of renal stones on 10/16/19. Very difficult to access lower pole. Removed a lot of stone but there was another stone which was in a medial calyx I could not reach.   -seen 11/12/19 after ctrss repeat 11/8/19 done to eval residual stone burden showed 2 RLP 7mm stones (one at mouth and one in tic blocked by mouth) and much less stone burden overall.     Interval history:   Here today for stone treatment  Found to have repeated elevated pth 163 (180s in 2/2018 and in 2011 was told it was elevated), low vit D (15) and hypercalcemia  She did her 24 hour urine 12/16/19      Urine history:   12/19/19 Ng, cath: neg  11/12/19 No cx, void: tr leuk/tr bld  10/16/19 Ng, void:3+bld/1+leuk/tr  prot  10/9/19            Ng, cath : 3+leuk/3+bld, >100 rbc/30 wbc  10/2/19            No cx, cath: 2+bld/3+leuk  9/26/19            P.mirablis, pvr by in and out cath: 100cc cloudy  9/4/19              No cx, void: 1+leuk, 19 wbc/8 rbc  8/28/19            P.mirabilis, void: 2+bld/2+leuk, 15 wbc, >100 rbc, cath: 3+ bld/3+leuk  2/23/18            Ng, void: 1+leuk, 38 wbc/2 rbc      REVIEW OF SYSTEMS:  General ROS: no fevers, no chills  Psychological ROS: no depression  Endocrine ROS: no heat or cold  Respiratory ROS:+ SOB since MI  Cardiovascular ROS: no CP  Gastrointestinal ROS: no abdominal pain, no constipation, no diarrhea, noBRBPR  Musculoskeletal ROS: no muscle pain  Neurological ROS: no headaches  Dermatological ROS: no rashes  HEENT: noglasses, no sinus   ROS: per HPI     PMHx:  Past Medical History:   Diagnosis Date    Allergy     Arthritis     spine-ddd    Asthma     usu allergy related    Back pain     Coronary artery disease 08/2019    cabg scheduled    Diverticulosis     Encounter for blood transfusion     High calcium levels 2015    Hypercholesteremia 2017    Hypertension     dr plunkett & dr pham    Hyperthyroidism     ???    Indigestion 08/2019    slight problem yrs ago    Kidney stone 10/2/2019    Lumbar radiculitis 1/30/2018    MI (myocardial infarction) 08/22/2019    angio done- cabg scheduled    NSTEMI (non-ST elevated myocardial infarction) 8/25/2019    Pain management 08/2019    dr mays ([place])    Renal stone 08/2019    to f/u with urology post op    Renal stone 2015    stone removed    Staph infection 2005    treated       PSHx:  Past Surgical History:   Procedure Laterality Date    ANGIOGRAM, CORONARY, WITH LEFT HEART CATHETERIZATION  8/27/2019    Procedure: Angiogram, Coronary, with Left Heart Cath;  Surgeon: Sean Pham MD;  Location: [facility] CATH/EP LAB;  Service: Cardiology;;    APPENDECTOMY      BACK SURGERY      CERVICAL, LUMBAR    CARDIAC SURGERY       CABG X 3  9-5-19    CERVICAL SPINE SURGERY  2006    Cervical fusions, titanium cages    CHOLECYSTECTOMY  02/28/2018    COLONOSCOPY      x 2    CORONARY ANGIOGRAPHY Left 8/27/2019    Procedure: ANGIOGRAM, CORONARY ARTERY;  Surgeon: Sean Pham MD;  Location: [facility] CATH/EP LAB;  Service: Cardiology;  Laterality: Left;    CORONARY ARTERY BYPASS GRAFT (CABG) N/A 9/5/2019    Procedure: CABG w/ EVH;  Surgeon: Freddy Julien MD;  Location: [facility] OR;  Service: Cardiothoracic;  Laterality: N/A;    CYSTOSCOPY WITH CALCULUS EXTRACTION Right 10/2/2019    Procedure: CYSTOSCOPY, WITH CALCULUS REMOVAL;  Surgeon: Niharika Collier MD;  Location: [facility] OR;  Service: Urology;  Laterality: Right;    CYSTOSCOPY WITH CALCULUS EXTRACTION Right 10/16/2019    Procedure: CYSTOSCOPY, WITH CALCULUS REMOVAL;  Surgeon: Niharika Collier MD;  Location: [facility];  Service: Urology;  Laterality: Right;    CYSTOURETEROSCOPY WITH RETROGRADE PYELOGRAPHY AND INSERTION OF STENT INTO URETER Right 10/2/2019    Procedure: CYSTOURETEROSCOPY, WITH RETROGRADE PYELOGRAM AND URETERAL STENT INSERTION;  Surgeon: Niharika Collier MD;  Location: [facility] OR;  Service: Urology;  Laterality: Right;    CYSTOURETEROSCOPY WITH RETROGRADE PYELOGRAPHY AND INSERTION OF STENT INTO URETER Right 10/16/2019    Procedure: CYSTOURETEROSCOPY, WITH RETROGRADE PYELOGRAM AND URETERAL STENT INSERTION;  Surgeon: Niharika Collier MD;  Location: [facility];  Service: Urology;  Laterality: Right;    ENDOSCOPIC HARVEST OF VEIN N/A 9/5/2019    Procedure: HARVEST-VEIN-ENDOVASCULAR;  Surgeon: Freddy Julien MD;  Location: [facility] OR;  Service: Cardiothoracic;  Laterality: N/A;    LASER LITHOTRIPSY Right 10/2/2019    Procedure: LITHOTRIPSY, USING LASER;  Surgeon: Niharika Collier MD;  Location: [facility];  Service: Urology;  Laterality: Right;    LASER LITHOTRIPSY Right 10/16/2019    Procedure: LITHOTRIPSY, USING LASER;  Surgeon:  Niharika Collier MD;  Location: [facility] OR;  Service: Urology;  Laterality: Right;    SPINE SURGERY      back    TONSILLECTOMY      URETEROSCOPY Right 10/2/2019    Procedure: URETEROSCOPY;  Surgeon: Niharika Collier MD;  Location: [facility] OR;  Service: Urology;  Laterality: Right;    URETEROSCOPY  10/16/2019    Procedure: URETEROSCOPY;  Surgeon: Niharika Collier MD;  Location: [facility] OR;  Service: Urology;;       Stents/Valves/Foreign Bodies/Cardiac Evaluation/Cardiologist: /  GI: , last colonoscopy:18 polyps, 2017 - was told to come back yearly but hasn't been able to go back    No family history on file.   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 No wheezing.   Cardiovascular: No obvious extremity edema. Warm and well perfused.   GI: no palpation of masses. No tenderness. No hepatosplenomegaly to palpation.  Musculoskeletal: normal range of motion of bilateral upper extremities. Normal muscle strength and tone.  Skin: no obvious rashes or lesions. No tightening of skin noted.  Neurologic: CN  grossly normal. Normal sensation.   Psychiatric: awake, alert and oriented x 3. Mood and affect normal. Cooperative.      LABS REVIEW:  Recent Labs   Lab 09/09/19  0417 09/27/19  1505 12/09/19  1113   WBC 7.97 6.15 6.71   Hemoglobin 8.9 L 11.9 L 15.5   Hematocrit 26.6 L 36.9 L 45.2   Platelets 105 L 323 204   ]  Recent Labs   Lab 08/28/19  0343 08/29/19  0325  09/06/19  0429 09/07/19  0442 09/08/19  0345 09/09/19  0417 09/27/19  1505 12/09/19  1113 12/16/19  1018   Sodium 143  143 138  138   < > 143 143 146 H 143 145 142 143   Potassium 3.7  3.7 3.5  3.5   < > 3.4 L 4.9 4.4 3.7 4.6 3.9 3.6   Chloride 110  110 105  105   < > 113 H 112 H 113 H 108 104 101 103   CO2 26  26 25  25   < > 21 L 27 29 29 31 H 33 H 31 H   BUN, Bld 33 H  33 H 37 H  37 H   < > 25 H 36 H 39 H 32 H 22 26 H 33 H   Creatinine 0.7  0.7 0.9  0.9   < > 0.7 0.7 0.7 0.7 0.9 1.1 0.9   Glucose 105  105 108  108   < > 173 H 132 H 110 103 110 91 91   Calcium 10.6 H  10.6 H 10.6 H  10.6 H   < > 8.5 L 9.7 9.3 9.1 11.2 H 12.3 HH 11.3 H   Magnesium 1.8 2.0   < > 2.2 2.1 2.0  --   --   --   --    Phosphorus 2.7 3.6   < > 3.2 1.9 L 1.6 L  --   --   --   --    Alkaline Phosphatase 92 104  --   --   --   --  46 L  --   --   --    Total Protein 6.6 7.5  --   --   --   --  4.9 L  --   --   --    Albumin 4.0 4.4  --   --   --   --  2.7 L  --   --   --    Total Bilirubin 1.9 H 1.5 H  --   --   --   --  1.6 H  --   --   --    AST 21 23  --   --   --   --  20  --   --   --    ALT 23 30  --   --   --   --  21  --   --   --     < > = values in this interval not displayed.   ]    Lab Results   Component Value Date    HGBA1C 5.2 02/23/2018      Lab Results   Component Value Date    .5 (H) 12/16/2019    CALCIUM 11.3 (H) 12/16/2019    PHOS 1.6 (L) 09/08/2019     Vit D 15        Recent Pertinent urologic PATHOLOGY REVIEW:  Stone analysis 10/2/19  Struvite stone- mg ammonium phosphate    Urine cytology [facility] 8/28/19  Negative for malignanct cells  Acute  inflammation  satisfactor for eval but with scant urothelial component  bloody      Recent Pertinent Urologic RADIOGRAPHIC REVIEW: (remainder of images reviewe/d)  kub 12/18/19  Stones in RLP  (only medial stone easily visible)    ctrss 11/8/19  There are approximately 2 stones of the right kidney the largest of which is in the lower pole measuring 7 mm.  Previously a significantly larger lower pole stone of the right kidney with staghorn morphology was present.  The left kidney demonstrates a few small stones measuring up to 4 mm within the lower pole, without significant change.  There is no ureterolithiasis or hydronephrosis.    The small bowel is normal caliber.  The appendix is normal.  There is moderate sigmoid diverticulosis.    There is moderate calcification of the aorta without aneurysm.  There has been a prior sternotomy.  There has been right unilateral lon and pedicle screw fusion of L5-S1 as well as anterior L5-S1 instrumented fusion.    Ctu 8/28/19  CT abdomen without contrast demonstrates bilateral renal calculi with a large staghorn type calculus filling the lower pole calyx of the right kidney (1.5cm).  Early arterial phase and delayed contrast enhanced phases demonstrates no renal masses.    cta 2/23/18  T abdomen without contrast demonstrates bilateral renal calculi with a large staghorn type calculus filling the lower pole calyx of the left kidney.  Early arterial phase and delayed contrast enhanced phases demonstrates no renal masses.    Assessment:       1. Nephrolithiasis        hyperparathryoidism and hypercalcemia    Plan:     Nephrolithiasis  -     Case Request Operating Room: LITHOTRIPSY, ESWL, CYSTOURETEROSCOPY, WITH RETROGRADE PYELOGRAM AND URETERAL STENT INSERTION      She still has RLP stones x 2 (one I think at the mouth of the diverticulum and the other within), both  Measuring about 1cm each. Too difficult to access all of the stone retrograde previously but also stone was very  large. Will try to shock the most medial stone which I think is at the mouth then plan for inversion therapy after. May work now since stone proximal to this no longer present.    -scheduled for  ESWL and cystoscopy and possible right retrograde pyelogram (may help identify mouth, review retrogrades again). 10.5cm SSD, 731 HU. Discussed stent and retrying urs with boston flex disposable which has better lower pole angle. Pt does not want stent. Knows we may still have stones we have to deal with it.   Pt leaving tomorrow for travel by car, recommended that she hold off at least one day. Discussed when to return to ER (fever/pain).     -may need tx of medial stone first (since was obstructing) and lateral stone later   -stopped asa 7days beforehand, cleared by cards 11/22/19.   -urine culture 10d ago neg. No sx of uti.     Still needs:   Review of 24 hour urine done.  Still has left renal stones (small)  Referral to endocrine for persistently elevated pth- already placed    The patient is scheduled for ESWL and possible stent. The risks and benefits of extracorporeal shock wave lithotripsy were discussed with the patient in detail.  Consent was obtained.  The risks include but are not limited to bleeding in or around the kidney, infection, pain, incomplete fragmentation of the stone requiring a repeat treatment or a different form of treatment, obstruction of the kidney by stone particles possibly requiring a ureteral stent or nephrostomy tube, injury to or loss of the kidney ,injury to the ureter or bladder, need for further procedures, hypertension (transient or permanent), recurrence of stones, and need for open surgery.  Alternative treatments were also discussed with the patient in detail to include ureteroscopy, percutaneous treatment of the stone, open surgery  and observation. Patient understands these risks and has agreed to proceed with surgery.     Niharika Collier MD

## 2019-12-18 NOTE — TRANSFER OF CARE
"Anesthesia Transfer of Care Note    Patient: Nancy Muñoz    Procedure(s) Performed: Procedure(s) (LRB):  LITHOTRIPSY, ESWL (Right)    Patient location: PACU    Anesthesia Type: general    Transport from OR: Transported from OR on 2-3 L/min O2 by NC with adequate spontaneous ventilation    Post pain: adequate analgesia    Post assessment: no apparent anesthetic complications    Post vital signs: stable    Level of consciousness: awake    Nausea/Vomiting: no nausea/vomiting    Complications: none    Transfer of care protocol was followed      Last vitals:   Visit Vitals  /62   Pulse 96   Temp 36.6 °C (97.9 °F)   Resp 18   Ht 5' 3" (1.6 m)   Wt 68 kg (150 lb)   LMP 01/27/2002 (Approximate)   SpO2 95%   Breastfeeding? No   BMI 26.57 kg/m²     "

## 2019-12-18 NOTE — PLAN OF CARE
Pt aaox4, denies pain and nausea, tolerating clear liquids, dressing cdi, family updated, anestheia states pt  meets anesthesia criteria to transfer to Phase II, report given to LAITH Padilla

## 2019-12-18 NOTE — ANESTHESIA PREPROCEDURE EVALUATION
12/18/2019  Nancy Muñoz is a 64 y.o., female.    Pre-op Assessment    I have reviewed the Patient Summary Reports.     I have reviewed the Nursing Notes.   I have reviewed the Medications.     Review of Systems  Anesthesia Hx:  No problems with previous Anesthesia  Denies Family Hx of Anesthesia complications.   Denies Personal Hx of Anesthesia complications.   Social:  Former Smoker    Hematology/Oncology:  Hematology Normal   Oncology Normal     EENT/Dental:EENT/Dental Normal   Cardiovascular:   Hypertension Past MI CAD asymptomatic CABG/stent     Pulmonary:   Asthma    Renal/:   Chronic Renal Disease renal calculi    Musculoskeletal:   Arthritis   Spine Disorders: lumbar    Neurological:   Neuromuscular Disease,    Endocrine:   Hyperthyroidism        Physical Exam  General:  Well nourished    Airway/Jaw/Neck:  Airway Findings: Mouth Opening: Normal Tongue: Normal  General Airway Assessment: Adult  Mallampati: II  TM Distance: Normal, at least 6 cm        Eyes/Ears/Nose:  EYES/EARS/NOSE FINDINGS: Normal   Dental:  DENTAL FINDINGS: Normal   Chest/Lungs:  Chest/Lungs Findings: Clear to auscultation, Normal Respiratory Rate     Heart/Vascular:  Heart Findings: Rate: Normal  Rhythm: Regular Rhythm        Mental Status:  Mental Status Findings:  Cooperative, Alert and Oriented         Anesthesia Plan  Type of Anesthesia, risks & benefits discussed:  Anesthesia Type:  general  Patient's Preference:   Intra-op Monitoring Plan: standard ASA monitors  Intra-op Monitoring Plan Comments:   Post Op Pain Control Plan: IV/PO Opioids PRN and multimodal analgesia  Post Op Pain Control Plan Comments:   Induction:   IV  Beta Blocker:  Patient is on a Beta-Blocker and has received one dose within the past 24 hours (No further documentation required).       Informed Consent: Patient understands risks and agrees with  Anesthesia plan.  Questions answered. Anesthesia consent signed with patient.  ASA Score: 3     Day of Surgery Review of History & Physical:    H&P update referred to the surgeon.         Ready For Surgery From Anesthesia Perspective.

## 2019-12-18 NOTE — TELEPHONE ENCOUNTER
Spoke w pt informed of f/u needed with NP in 6 wks along with imaging pt voiced ok pt was also reminded to have 24 hr urine done in a timely manner pt voiced was set up to be picked up today. appt reminders mailed to home- Pt voiced ok and understanding.

## 2019-12-18 NOTE — ANESTHESIA PROCEDURE NOTES
Intubation  Performed by: Zahra Arce CRNA  Authorized by: Johnathon Almanzar MD     Intubation:     Induction:  Intravenous    Intubated:  Postinduction    Attempts:  1    Attempted By:  CRNA    Difficult Airway Encountered?: No      Complications:  None    Airway Device:  Supraglottic airway/LMA    Airway Device Size:  3.0    Style/Cuff Inflation:  Cuffed (inflated to minimal occlusive pressure)    Secured at:  The lips    Placement Verified By:  Capnometry    Complicating Factors:  Poor neck/head extension    Findings Post-Intubation:  BS equal bilateral

## 2019-12-18 NOTE — DISCHARGE INSTRUCTIONS
Your [facility] urologist is Dr.Jennifer LEATHA Collier   Her office is located at [address] [place] [address] , [address] [place] [address]   Her office number is [phone] for any questions during office hours   The hospital number and on call physician number for after hour calls is [phone]   She encourages you to sign up for [facility] and you can easily communicate with messaging through this julian   If you do not have any follow-up scheduled, please confirm that no follow up was needed     [facility] post-op instructions for :    Instructions specific to this patient:   kub and rbus in 6 weeks to see if stone still visible   augmentin twice a day for 3 days   Inversion therapy x 3 days, see d/c instructions   Collect fragments but doesn't have to bring   F/u with melsissa in 6 weeks to review 24 hour urine and kub - should have seen endocrine by then. When 24 hour urine comes in, pt needs to be mailed a copy to bring to her endocrine appt.   Percocet prn rx she already has    Follow up with endocrinology for elevated parathyroid hormone     Home with:  -urine strainer: strain all urine and bring fragments to appointment so we can send for analysis    Home medications and prescriptions:  -augmentin BY MOUTH TWICE DAILY FOR 3 DAYS   -Flomax/tamsulosin 0.4mg by mouth nightly for 3 weeks to help stretch ureter and pass stone fragment (can cause lightheadness if you stand up too fast, so take at night)  -norco/hydrocodone 5, take every 4 to 6 hours as needed (dispense  15)-take miralax 17g daily while taking pain medicine  -you can restart any blood thinners 2 days later if urine is clear yellow, if not, and patient is unsure of when to restart then call office. If planning on having a planned ureteroscopy in two weeks then will remain off blood thinners until then. If patient is scheduled for a cystoscopy stent removal only can start the blood thinners as recommended.     Follow up:  -You will  follow up in clinicin 4 weeks most likely with the nurse practitioner. You should have an xray scheduled before your follow-up to see how you responded to the shock wave. If you do not have this scheduled (xray or follow-up, please call the clinic within 2 days of your procedure).    -if you still have more stones we will continue to monitor these and decide on treatment once they are larger  -if this is not your first stone then we will likely proceed with a workup to figure out why you make stones (this includes a 24 hour urine collection and some blood tests)  -Bring stone fragments with you to your next appt.    no stent was placed:  See stent instructions    You have a stone in lower pole (or bottom of the kidney), so I recommend inversion therapy   You will drink 1 bottle of water firstand 15 minutes later you will  taisha over and have someone or yourself palpate your back for about a minute to see if we can get the stone fragments to move up and out (over the hill out of the bottom of the kidney)   You will do this 3 times a day for a total of 3 days      Other instructions  You may see some blood in your urine while the stone fragments are passing and a few days afterward. Do not be alarmed, even if the urine was clear for a while. Push fluids and refrain from strenuous activity until clearing occurs. If you have difficulty passing clots or don't improve, call us. You can also try sitting in a warm tub of water to help to urinate if needed. Avoid medications such as Aspirin, Advil, Motrin, Plavix, or Coumadin, which thin the blood and cause bleeding.  If you have never had your stones analyzed, strain all urine and bring stone fragments with you to your next appointment.    Diet:  You may return to your normal diet immediately. Alcohol, spicy foods, acidy foods and drinks with caffeine may cause irritation or frequency and should be used in moderation. To keep your urine flowing freely and to avoid  constipation, drink plenty of fluids during the day (8-10 glasses).    Activity:  While the kidney is healing do not engage in strenuous activity. If you are active, you may see more blood in the urine. We would suggest cutting down your activity under these circumstances until the bleeding has stopped, perhaps two weeks.    Bowels:  It is important to keep your bowels regular during the postoperative period. Straining with bowel movements can cause bleeding. A bowel movement every other day is reasonable. Use a mild over-the-counter laxative if needed, such as Milk of Magnesia 2-3 tablespoons, or 1-2 Dulcolax tablets. Call if you continue to have problems. Narcotics can worsen constipation; if you had been taking narcotics for pain, before, during or after your surgery, you may be constipated. Ditropan for bladder spasms may also cause constipation.    Problems you should report to us:  1. Fevers over 101.5 degrees Fahrenheit  2. Inability to urinate.   3. Drug reactions (hives, rash, nausea, vomiting, diarrhea)   4. Severe burning or pain with urination that is not improving.   5. Severe pain in your kidney not relieved with pain medications, go to ER if this occurs. You could be passing a large stone or have a blood clot around the kidney. They will likely do a ct scan to determine. Please call our office so we  know that you are heading to the ER.   6. You will also have some burning with urination. This is normal after stone therapy and is also expected while the stent is in place. This burning should be mild or moderate and should improve over time. Severe burning, especially when it is not improving, can be a sign of infection.    Call Urology at  with any questions       Discharge Instructions: After Your Surgery/Procedure  Youve just had surgery. During surgery you were given medicine called anesthesia to keep you relaxed and free of pain. After surgery you may have some pain or nausea. This is  "common. Here are some tips for feeling better and getting well after surgery.     Stay on schedule with your medication.   Going home  Your doctor or nurse will show you how to take care of yourself when you go home. He or she will also answer your questions. Have an adult family member or friend drive you home.      For your safety we recommend these precaution for the first 24 hours after your procedure:  · Do not drive or use heavy equipment.  · Do not make important decisions or sign legal papers.  · Do not drink alcohol.  · Have someone stay with you, if needed. He or she can watch for problems and help keep you safe.  · Your concentration, balance, coordination, and judgement may be impaired for many hours after anesthesia.  Use caution when ambulating or standing up.     · You may feel weak and "washed out" after anesthesia and surgery.      Subtle residual effects of general anesthesia or sedation with regional / local anesthesia can last more than 24 hours.  Rest for the remainder of the day or longer if your Doctor/Surgeon has advised you to do so.  Although you may feel normal within the first 24 hours, your reflexes and mental ability may be impaired without you realizing it.  You may feel dizzy, lightheaded or sleepy for 24 hours or longer.      Be sure to go to all follow-up visits with your doctor. And rest after your surgery for as long as your doctor tells you to.  Coping with pain  If you have pain after surgery, pain medicine will help you feel better. Take it as told, before pain becomes severe. Also, ask your doctor or pharmacist about other ways to control pain. This might be with heat, ice, or relaxation. And follow any other instructions your surgeon or nurse gives you.  Tips for taking pain medicine  To get the best relief possible, remember these points:  · Pain medicines can upset your stomach. Taking them with a little food may help.  · Most pain relievers taken by mouth need at least 20 " to 30 minutes to start to work.  · Taking medicine on a schedule can help you remember to take it. Try to time your medicine so that you can take it before starting an activity. This might be before you get dressed, go for a walk, or sit down for dinner.  · Constipation is a common side effect of pain medicines. Call your doctor before taking any medicines such as laxatives or stool softeners to help ease constipation. Also ask if you should skip any foods. Drinking lots of fluids and eating foods such as fruits and vegetables that are high in fiber can also help. Remember, do not take laxatives unless your surgeon has prescribed them.  · Drinking alcohol and taking pain medicine can cause dizziness and slow your breathing. It can even be deadly. Do not drink alcohol while taking pain medicine.  · Pain medicine can make you react more slowly to things. Do not drive or run machinery while taking pain medicine.  Your health care provider may tell you to take acetaminophen to help ease your pain. Ask him or her how much you are supposed to take each day. Acetaminophen or other pain relievers may interact with your prescription medicines or other over-the-counter (OTC) drugs. Some prescription medicines have acetaminophen and other ingredients. Using both prescription and OTC acetaminophen for pain can cause you to overdose. Read the labels on your OTC medicines with care. This will help you to clearly know the list of ingredients, how much to take, and any warnings. It may also help you not take too much acetaminophen. If you have questions or do not understand the information, ask your pharmacist or health care provider to explain it to you before you take the OTC medicine.  Managing nausea  Some people have an upset stomach after surgery. This is often because of anesthesia, pain, or pain medicine, or the stress of surgery. These tips will help you handle nausea and eat healthy foods as you get better. If you were on  a special food plan before surgery, ask your doctor if you should follow it while you get better. These tips may help:  · Do not push yourself to eat. Your body will tell you when to eat and how much.  · Start off with clear liquids and soup. They are easier to digest.  · Next try semi-solid foods, such as mashed potatoes, applesauce, and gelatin, as you feel ready.  · Slowly move to solid foods. Dont eat fatty, rich, or spicy foods at first.  · Do not force yourself to have 3 large meals a day. Instead eat smaller amounts more often.  · Take pain medicines with a small amount of solid food, such as crackers or toast, to avoid nausea.     Call your surgeon if  · You still have pain an hour after taking medicine. The medicine may not be strong enough.  · You feel too sleepy, dizzy, or groggy. The medicine may be too strong.  · You have side effects like nausea, vomiting, or skin changes, such as rash, itching, or hives.       If you have obstructive sleep apnea  You were given anesthesia medicine during surgery to keep you comfortable and free of pain. After surgery, you may have more apnea spells because of this medicine and other medicines you were given. The spells may last longer than usual.   At home:  · Keep using the continuous positive airway pressure (CPAP) device when you sleep. Unless your health care provider tells you not to, use it when you sleep, day or night. CPAP is a common device used to treat obstructive sleep apnea.  · Talk with your provider before taking any pain medicine, muscle relaxants, or sedatives. Your provider will tell you about the possible dangers of taking these medicines.  © 7515-4317 [employer]. [address] [place] [address], [place] [address]. All rights reserved. This information is not intended as a substitute for professional medical care. Always follow your healthcare professional's instructions.      General Information:    1.  Do not drink alcoholic beverages  including beer for 24 hours or as long as you are on pain medication..  2.  Do not drive a motor vehicle, operate machinery or power tools, or signs legal papers for 24 hours or as long as you are on pain medication.   3.  You may experience light-headedness, dizziness, and sleepiness following surgery. Please do not stay alone. A responsible adult should be with you for this 24 hour period.  4.  Go home and rest.    5. Progress slowly to a normal diet unless instructed.  Otherwise, begin with liquids such as soft drinks, then soup and crackers working up to solid foods. Drink plenty of nonalcoholic fluids.  6.  Certain anesthetics and pain medications produce nausea and vomiting in certain       individuals. If nausea becomes a problem at home, call you doctor.    7. A nurse will be calling you sometime after surgery. Do not be alarmed. This is our way of finding out how you are doing.    8. Several times every hour while you are awake, take 2-3 deep breaths and cough. If you had stomach surgery hold a pillow or rolled towel firmly against your stomach before you cough. This will help with any pain the cough might cause.  9. Several times every hour while you are awake, pump and flex your feet 5-6 times and do foot circles. This will help prevent blood clots.    10.Call your doctor for severe pain, bleeding, fever, or signs or symptoms of infection (pain, swelling, redness, foul odor, drainage).    Post op instructions for prevention of DVT  What is deep vein thrombosis?  Deep vein thrombosis (DVT) is the medical term for blood clots in the deep veins of the leg.  These blood clots can be dangerous.  A DVT can block a blood vessel and keep blood from getting where it needs to go.  Another problem is that the clot can travel to other parts of the body such as the lungs.  A clot that travels to the lungs is called a pulmonary embolus (PE) and can cause serious problems with breathing which can lead to death.  Am I at  risk for DVT/PE?  If you are not very active, you are at risk of DVT.  Anyone confined to bed, sitting for long periods of time, recovering from surgery, etc. increases the risk of DVT.  Other risk factors are cancer diagnosis, certain medications, estrogen replacement in any form,older age, obesity, pregnancy, smoking, history of clotting disorders, and dehydration.  How will I know if I have a DVT?   Swelling in the lower leg   Pain   Warmth, redness, hardness or bulging of the vein  If you have any of these symptoms, call your doctors office right away.  Some people will not have any symptoms until the clot moves to the lungs.  What are the symptoms of a PE?   Panting, shortness of breath, or trouble breathing   Sharp, knife-like chest pain when you breathe   Coughing or coughing up blood   Rapid heartbeat  If you have any of these symptoms or get worse quickly, call 911 for emergency treatment.  How can I prevent a DVT?   Avoid long periods of inactivity and dont cross your legs--get up and walk around every hour or so.   Stay active--walking after surgery is highly encouraged.  This means you should get out of the house and walk in the neighborhood.  Going up and down stairs will not impair healing (unless advised against such activity by your doctor).     Drink plenty of noncaffeinated, nonalcoholic fluids each day to prevent dehydration.   Wear special support stockings, if they have been advised by your doctor.   If you travel, stop at least once an hour and walk around.   Avoid smoking (assistance with stopping is available through your healthcare provider)  Always notify your doctor if you are not able to follow the post operative instructions that are given to you at the time of discharge.  It may be necessary to prescribe one of the medications available to prevent DVT.    We hope your stay was comfortable as you heal now, mend and rest.    For we have enjoyed taking care of you by giving  your our best.    And as you get better, by regaining your health and strength;   We count it as a privilege to have served you and hope your time at [facility] was well spent.      Thank  You!!!

## 2019-12-18 NOTE — TELEPHONE ENCOUNTER
----- Message from Niharika Collier MD sent at 12/18/2019 10:45 AM CST -----  rbus and kub in  6 weeks rosalia f/u with tamir  F/u with melsissa in 6 weeks to review 24 hour urine and kub - should have seen endocrine by then. When 24 hour urine comes in, pt needs to be mailed a copy to bring to her endocrine

## 2019-12-18 NOTE — ANESTHESIA POSTPROCEDURE EVALUATION
Anesthesia Post Evaluation    Patient: Nancy Muñoz    Procedure(s) Performed: Procedure(s) (LRB):  LITHOTRIPSY, ESWL (Right)    Final Anesthesia Type: general    Patient location during evaluation: PACU  Patient participation: Yes- Able to Participate  Level of consciousness: awake and alert  Post-procedure vital signs: reviewed and stable  Pain management: adequate  Airway patency: patent    PONV status at discharge: No PONV  Anesthetic complications: no      Cardiovascular status: hemodynamically stable  Respiratory status: unassisted and room air  Hydration status: euvolemic  Follow-up not needed.          Vitals Value Taken Time   /58 12/18/2019 11:01 AM   Temp 36.4 °C (97.5 °F) 12/18/2019 10:30 AM   Pulse 74 12/18/2019 11:01 AM   Resp 18 12/18/2019 11:01 AM   SpO2 98 % 12/18/2019 11:01 AM         Event Time     Out of Recovery 11:01:00          Pain/Ellyn Score: Pain Rating Prior to Med Admin: 3 (12/18/2019 10:45 AM)  Ellyn Score: 10 (12/18/2019 11:20 AM)

## 2019-12-18 NOTE — BRIEF OP NOTE
[facility]  Urology  Discharge Note - Short Stay      Patient Name: Nancy Muñoz  MRN: [id number]  Discharge Date and Time:  12/18/2019 10:48 AM  Attending Physician: Niharika Collier,*   Discharging Provider: Niharika Collier MD  Primary Care Physician: Jan Petersen MD    Final Active Diagnoses:    Diagnosis Date Noted POA    Nephrolithiasis [N20.0] 10/16/2019 Yes      Problems Resolved During this Admission:       Final Diagnoses: Same as principal problem.    Hospital Course: Patient was admitted for an outpatient procedure and tolerated the procedure well with no complications.*    Procedure(s) (LRB):  LITHOTRIPSY, ESWL (Right)     Indwelling Lines/Drains at time of discharge:   Lines/Drains/Airways     Drain                 Ureteral Drain/Stent 10/02/19 1209 Right ureter 6 Fr. 76 days         Ureteral Drain/Stent 10/16/19 1039 Right ureter 6 Fr. 63 days                Discharged Condition: good  Disposition: home    Follow Up:      Patient Instructions:      X-Ray Abdomen AP 1 View   Standing Status: Future Standing Exp. Date: 12/17/20     Order Specific Question Answer Comments   Reason for Exam: kub s/p ESWL      US Retroperitoneal Complete (Kidney and   Standing Status: Future Standing Exp. Date: 12/18/20     Order Specific Question Answer Comments   Reason for Exam: right renal stones s/p ESWL    May the Radiologist modify the order per protocol to meet the clinical needs of the patient? Yes      Diet Adult Regular     Notify your health care provider if you experience any of the following:  temperature >100.4       Medications:  Reconciled Home Medications:      Medication List      START taking these medications    amoxicillin-clavulanate 875-125mg 875-125 mg per tablet  Commonly known as:  AUGMENTIN  Take 1 tablet by mouth 2 (two) times daily. for 3 days        CONTINUE taking these medications    aspirin 81 MG EC tablet  Commonly known as:  ECOTRIN  Take 1 tablet  (81 mg total) by mouth once daily.     atorvastatin 10 MG tablet  Commonly known as:  LIPITOR  Take 10 mg by mouth once daily.     diphenhydrAMINE 50 MG capsule  Commonly known as:  BENADRYL  Take 25 mg by mouth every 6 (six) hours as needed for Itching.     METHOCARBAMOL ORAL  Take by mouth.     metoprolol succinate 25 MG 24 hr tablet  Commonly known as:  TOPROL-XL  Take 1 tablet (25 mg total) by mouth once daily. for 30 doses     oxyCODONE-acetaminophen  mg per tablet  Commonly known as:  PERCOCET  Take 1 tablet by mouth every 8 (eight) hours as needed for Pain.     ProAir HFA 90 mcg/actuation inhaler  Generic drug:  albuterol  Inhale 2 puffs into the lungs every 6 (six) hours as needed for Wheezing. Rescue     tamsulosin 0.4 mg Cap  Commonly known as:  FLOMAX  Take 1 capsule (0.4 mg total) by mouth after dinner.     triamterene-hydrochlorothiazide 37.5-25 mg 37.5-25 mg per capsule  Commonly known as:  DYAZIDE  Take 1 capsule by mouth once daily.            Discharge Procedure Orders (must include Diet, Follow-up, Activity):   Discharge Procedure Orders (must include Diet, Follow-up, Activity)   X-Ray Abdomen AP 1 View   Standing Status: Future Standing Exp. Date: 12/17/20     Order Specific Question Answer Comments   Reason for Exam: kub s/p ESWL      US Retroperitoneal Complete (Kidney and   Standing Status: Future Standing Exp. Date: 12/18/20     Order Specific Question Answer Comments   Reason for Exam: right renal stones s/p ESWL    May the Radiologist modify the order per protocol to meet the clinical needs of the patient? Yes      Diet Adult Regular     Notify your health care provider if you experience any of the following:  temperature >100.4            Niharika Collier MD  Urology  [facility]

## 2019-12-18 NOTE — PLAN OF CARE
Discharge instructions given to pt and spouse, verbalized understanding. IV removed. Ambulated to restroom, voided 400ml. Supplies given to pt to strain urine. Discharge via wheelchair.

## 2019-12-19 VITALS
HEART RATE: 74 BPM | HEIGHT: 63 IN | SYSTOLIC BLOOD PRESSURE: 104 MMHG | RESPIRATION RATE: 18 BRPM | OXYGEN SATURATION: 98 % | DIASTOLIC BLOOD PRESSURE: 58 MMHG | TEMPERATURE: 98 F | WEIGHT: 150 LBS | BODY MASS INDEX: 26.58 KG/M2

## 2020-01-08 ENCOUNTER — TELEPHONE (OUTPATIENT)
Dept: ENDOCRINOLOGY | Facility: CLINIC | Age: 65
End: 2020-01-08

## 2020-01-09 ENCOUNTER — OFFICE VISIT (OUTPATIENT)
Dept: ENDOCRINOLOGY | Facility: CLINIC | Age: 65
End: 2020-01-09
Payer: MEDICARE

## 2020-01-09 VITALS
HEART RATE: 63 BPM | HEIGHT: 63 IN | DIASTOLIC BLOOD PRESSURE: 62 MMHG | SYSTOLIC BLOOD PRESSURE: 120 MMHG | BODY MASS INDEX: 27.49 KG/M2 | WEIGHT: 155.13 LBS | TEMPERATURE: 98 F

## 2020-01-09 DIAGNOSIS — E21.3 HYPERPARATHYROIDISM: Primary | ICD-10-CM

## 2020-01-09 PROCEDURE — 99204 PR OFFICE/OUTPT VISIT, NEW, LEVL IV, 45-59 MIN: ICD-10-PCS | Mod: S$GLB,,, | Performed by: INTERNAL MEDICINE

## 2020-01-09 PROCEDURE — 99999 PR PBB SHADOW E&M-EST. PATIENT-LVL IV: ICD-10-PCS | Mod: PBBFAC,,, | Performed by: INTERNAL MEDICINE

## 2020-01-09 PROCEDURE — 99204 OFFICE O/P NEW MOD 45 MIN: CPT | Mod: S$GLB,,, | Performed by: INTERNAL MEDICINE

## 2020-01-09 PROCEDURE — 99999 PR PBB SHADOW E&M-EST. PATIENT-LVL IV: CPT | Mod: PBBFAC,,, | Performed by: INTERNAL MEDICINE

## 2020-01-09 NOTE — PATIENT INSTRUCTIONS
For the calcium and parathyroid:   Need an ultrasound of the neck to see if we can find which of the parathyroid glands is overactive.  At some point as well, need a bone density to ensure this hasn't caused any osteoporosis.    Your vitamin D was low, start taking 2,000 units per day over the counter vitamin D.    Other evaluation would be 24 hour urine, but will see if the results that you did with the urology.        Understanding Primary Hyperparathyroidism    The parathyroid glands are 4 tiny glands in the neck. They make parathyroid hormone (PTH). PTH controls the amount of calcium and phosphorus in your blood. Primary hyperparathyroidism is when there is too much PTH in your blood. It occurs when one or more of the glands are too active.  The job of PTH is to tell the body how to control calcium. Too much PTH means the body increases the amount of calcium in the blood. This leads to a problem called hypercalcemia. This is when the amount of calcium in the blood is too high. Hypercalcemia can cause serious health problems.  Causes  Hyperparathyroidism can occur when a parathyroid gland becomes enlarged. It can also occur as a complication of another health conditions, such as kidney failure or rickets. In these conditions, calcium is usually not high. This is called secondary hyperparathyroidism.  Whos at risk  The risk factors for this condition include:  · Being a woman (its less common in men)  · Being older (its more likely to occur with age)  · Having parents or siblings with the condition or other endocrine tumors  · Having certain kidney problems  · Taking certain medicines  · Having had radiation treatment in the head or neck  Symptoms  Symptoms of the condition can include:  · Muscle weakness  · Depression  · Tiredness  · Confusion and memory loss  · Poor memory  · Nausea and vomiting  · Pain in the stomach area (abdomen)  · Hard stools (constipation)  · Stomach ulcers  · Need to urinate  often  · Kidney stones  · Joint or bone pain  · Bone disease (osteopenia or osteoporosis), an increase in bone fractures  · High blood pressure  · Increased thirst  Treatment  If primary hyperparathyroidism is not treated, it can get worse over time. Treatments include:  · Surgery. This may be done to remove any enlarged parathyroid glands. This lets the amount of calcium in the blood go back to normal. You may need to take vitamin D and calcium supplements before the surgery. This will reduce the risk of low calcium after the surgery.   · Medicine. This lowers the amount of parathyroid hormone made by the overactive glands.   You and your healthcare provider can discuss your treatment options. Make sure to ask any questions you have.  Date Last Reviewed: 11/1/2016 © 2000-2017 [employer]. [address] [place] [address], [place] [address]. All rights reserved. This information is not intended as a substitute for professional medical care. Always follow your healthcare professional's instructions.

## 2020-01-09 NOTE — ASSESSMENT & PLAN NOTE
Most likely primary hyperparathyroidism   - however does have somewhat low vit D (but hyperpar from low vit D doesn't have elevated calcium). Is on HCTZ which can raise the calcium, but probably not to the same degree that she has   - recommend 24 hr urine to evaluate calcium, creatinine, pt reports she already had that with urology (results not available for review). However she was on HCTZ at that time, so calcium will be falsely lower  - Check bone DXA, including forearm.  Discussed indications for surgery if primary hyperparathyroidism proven- kidney stones, pretty high Ca would qualify (though Ca is elevated in part from the medication)   - will see how bones are looking, repeat labs in a couple of months, obtain US to evaluate for adenoma (and consider NM scan if negative) in preparation for likely surgery  Avoid dehydration, excessive calcium supplementation and meds that can worsen hypercalcemia.   - Already on HCTZ which will worsen hypercalcemia. Would prefer to stop that combo pill and just have patient on triamterene. However discussed with her that if urology started the HCTZ to try and decrease calcium in the urine if that's involved in her stone production, then we would need to more carefully weigh the risks vs benefits of stopping that component. Will f/u with urology and get back to patient

## 2020-01-09 NOTE — LETTER
January 9, 2020        Niharika Collier MD  [address] [place] [address] Dr  [address]  [place] [address]             [place] - Endo/Diabetes  [address]  [place] [address]  Phone: [phone]   Patient: Nancy Muñoz   MR Number: [id number]   YOB: 1955   Date of Visit: 1/9/2020       Dear Dr. Collier:    Thank you for referring Nancy Muñoz to me for evaluation. Below are the relevant portions of my assessment and plan of care.    Looks like probably primary hyperparathyroidism, with decently high calcium and stones, would be a candidate for parathyroid surgery. Trying to localize an adenoma. But has a few complicating factors - she's on hydrochlorothiazide, which will decrease urinary calcium excretion and increase blood calcium. Were you using the HCTZ to try and decrease stone formation in her? If not, I'd like to stop the HCTZ and see if she still has significant blood calcium elevation as well as repeat the 24 hour urine calcium to complete her parathyroid/hypercalcemia evaluation.    If you have questions, please do not hesitate to call me. I look forward to following Nancy along with you.    Sincerely,      James Dumas MD

## 2020-01-09 NOTE — PROGRESS NOTES
Subjective:      Chief Complaint: Vitamin D Deficiency    HPI: Nancy Muñoz is a 64 y.o. female who is here for an initial evaluation for hyperparathyroidism.    With regards to the hypercalcemia, hyperparathyroidism:  Had kidney stones, at least 10 years ago, was told calcium was high at that time as well.    PTH was checked: high.   193 in 2/2018   163 on repeat 12/2019    Parathyroid imaging: none  Thyroid ultrasound: none    Last labs:  Lab Results   Component Value Date    CALCIUM 11.3 (H) 12/16/2019    ALBUMIN 2.7 (L) 09/09/2019    CREATININE 0.9 12/16/2019    [id number] 16 (L) 12/16/2019    .5 (H) 12/16/2019     Daily intake of calcium is: not much  Taking vitamin D: none    Last bmd was: many years ago, fine per patient (over 10 years ago).   Prior fx: left foot, 4-5 years ago (ladder fell onto foot). Finger fracture.    +kidney stones.   Had struvite, 80%, 20% calcium    Pt is on HCTZ (Dyazide 25 mg of HCTZ in that).    Today, pt reports having degen disc disease, pain issues. Some numbness in hands, leg pains.    Reviewed past medical, family, social history and updated as appropriate.    Review of Systems   Constitutional: Positive for unexpected weight change (lost weight after heart surgery, gained it back).   HENT: Negative for trouble swallowing.    Eyes: Positive for visual disturbance.   Respiratory: Positive for shortness of breath.    Cardiovascular: Positive for palpitations (sometimes with exertion).   Gastrointestinal: Positive for constipation (sometimes). Negative for diarrhea.   Endocrine: Negative for polydipsia and polyuria.   Genitourinary: Negative for frequency.   Musculoskeletal: Positive for back pain.        Pains   Neurological: Negative for light-headedness.     Objective:     Vitals:    01/09/20 1411   BP: 120/62   Pulse: 63   Temp: 98.1 °F (36.7 °C)     BP Readings from Last 5 Encounters:   01/09/20 120/62   12/18/19 (!) 104/58   11/12/19 114/69   10/16/19 107/77    10/02/19 124/80     Physical Exam   Constitutional: She is oriented to person, place, and time. She appears well-developed and well-nourished.   HENT:   Head: Normocephalic and atraumatic.   Eyes: EOM are normal.   Neck: Normal range of motion. Neck supple. No thyromegaly present.   Cardiovascular: Normal rate and regular rhythm.   No murmur heard.  Pulmonary/Chest: Effort normal and breath sounds normal.   Abdominal: Soft. She exhibits no distension and no mass. There is no tenderness.   Musculoskeletal: Normal range of motion. She exhibits no edema or tenderness.   Neurological: She is alert and oriented to person, place, and time.   Psychiatric: She has a normal mood and affect. Judgment normal.   Vitals reviewed.    Wt Readings from Last 5 Encounters:   01/09/20 1411 70.4 kg (155 lb 1.5 oz)   12/18/19 0742 68 kg (150 lb)   12/09/19 1100 68 kg (150 lb)   11/12/19 1552 68.7 kg (151 lb 7.3 oz)   10/16/19 0631 67.6 kg (149 lb)   10/14/19 1533 67.6 kg (149 lb)     Lab Results   Component Value Date    HGBA1C 5.2 02/23/2018     Lab Results   Component Value Date     12/16/2019    K 3.6 12/16/2019     12/16/2019    CO2 31 (H) 12/16/2019    GLU 91 12/16/2019    BUN 33 (H) 12/16/2019    CREATININE 0.9 12/16/2019    CALCIUM 11.3 (H) 12/16/2019    PROT 4.9 (L) 09/09/2019    ALBUMIN 2.7 (L) 09/09/2019    BILITOT 1.6 (H) 09/09/2019    ALKPHOS 46 (L) 09/09/2019    AST 20 09/09/2019    ALT 21 09/09/2019    ANIONGAP 9 12/16/2019    ESTGFRAFRICA >60 12/16/2019    EGFRNONAA >60 12/16/2019      Assessment/Plan:     Hyperparathyroidism  Most likely primary hyperparathyroidism   - however does have somewhat low vit D (but hyperpar from low vit D doesn't have elevated calcium). Is on HCTZ which can raise the calcium, but probably not to the same degree that she has   - recommend 24 hr urine to evaluate calcium, creatinine, pt reports she already had that with urology (results not available for review). However she was  on HCTZ at that time, so calcium will be falsely lower  - Check bone DXA, including forearm.  Discussed indications for surgery if primary hyperparathyroidism proven- kidney stones, pretty high Ca would qualify (though Ca is elevated in part from the medication)   - will see how bones are looking, repeat labs in a couple of months, obtain US to evaluate for adenoma (and consider NM scan if negative) in preparation for likely surgery  Avoid dehydration, excessive calcium supplementation and meds that can worsen hypercalcemia.   - Already on HCTZ which will worsen hypercalcemia. Would prefer to stop that combo pill and just have patient on triamterene. However discussed with her that if urology started the HCTZ to try and decrease calcium in the urine if that's involved in her stone production, then we would need to more carefully weigh the risks vs benefits of stopping that component. Will f/u with urology and get back to patient        Follow up in about 4 months (around 5/9/2020).

## 2020-01-09 NOTE — Clinical Note
January 9, 2020      No Recipients           [facility] - Endo/Diabetes  [address]  [place] [address]  Phone: [phone]          Patient: Nancy Muñoz   MR Number: [id number]   YOB: 1955   Date of Visit: 1/9/2020       Dear :    Thank you for referring Nancy Muñoz to me for evaluation. Attached you will find relevant portions of my assessment and plan of care.    If you have questions, please do not hesitate to call me. I look forward to following Nancy Muñoz along with you.    Sincerely,    aJmes Dumas MD    Enclosure  CC:  No Recipients    If you would like to receive this communication electronically, please contact [email or web address] or [phone] to request more information on [employer] access.    For providers and/or their staff who would like to refer a patient to [facility], please contact us through our one-stop-shop provider referral line, [facility], at [phone].    If you feel you have received this communication in error or would no longer like to receive these types of communications, please e-mail [email or web address]

## 2020-01-13 ENCOUNTER — TELEPHONE (OUTPATIENT)
Dept: UROLOGY | Facility: CLINIC | Age: 65
End: 2020-01-13

## 2020-01-13 ENCOUNTER — DOCUMENTATION ONLY (OUTPATIENT)
Dept: ENDOCRINOLOGY | Facility: CLINIC | Age: 65
End: 2020-01-13

## 2020-01-13 NOTE — TELEPHONE ENCOUNTER
24 Hour Urine Results From [facility]  Date: 12/18/19    Urine volume (0.5-4L): 1.23  SS CaOx (6-10): 16.05H  Urine Calcium (mg/day) (male <250, female <200): 357H  Urine Oxalate (mg/day) (20-40): 41H  Urine Citrate (mg/day)(male>450, female>550): 700  SS CaP (0.5-2): 3.64H  24 Hour Urine pH (5.8-6.2):6.366  SS Uric Acid (0-1): 0.41  Urine Uric Acid (g/day) (male<0.800, female<0.750): 0.527  Creatinine: 1487    Please mail a copy to the pt and tell her to follow up with   24 hour urine shows very elevated calcium in urine (along with mildly increased oxalate)

## 2020-01-13 NOTE — PROGRESS NOTES
24 hr urine results from urology reviewed.    Urine Ca 357, Cr 1487  Serum Ca 11.3, Cr 0.9  Calcium/creatinine clearance ratio 0.86559.      - These results are while on HCTZ, which if anything decreases renal excretion of calcium, so her real values would be higher. Ratio over 0.02 is suggestive of primary hyperparathyroidism, not at all consistent with FHH   Since already ruled out FHH, doesn't really need a repeat 24 hr urine off HCTZ.  Same plan, needs Bone density to complete risk evaluation with hyperpara as well as neck US. Meets surgical criteria, so want to try and localize an adenoma if possible (may need NM scan).

## 2020-01-16 RX ORDER — TAMSULOSIN HYDROCHLORIDE 0.4 MG/1
CAPSULE ORAL
Qty: 30 CAPSULE | Refills: 0 | Status: SHIPPED | OUTPATIENT
Start: 2020-01-16

## 2020-01-19 RX ORDER — TAMSULOSIN HYDROCHLORIDE 0.4 MG/1
CAPSULE ORAL
Qty: 90 CAPSULE | OUTPATIENT
Start: 2020-01-19

## 2020-01-21 ENCOUNTER — HOSPITAL ENCOUNTER (OUTPATIENT)
Dept: RADIOLOGY | Facility: CLINIC | Age: 65
Discharge: HOME | End: 2020-01-21
Attending: UROLOGY
Payer: MEDICARE

## 2020-01-21 DIAGNOSIS — N20.0 NEPHROLITHIASIS: ICD-10-CM

## 2020-01-21 PROCEDURE — 76770 US EXAM ABDO BACK WALL COMP: CPT | Mod: 26,,, | Performed by: RADIOLOGY

## 2020-01-21 PROCEDURE — 74018 XR ABDOMEN AP 1 VIEW: ICD-10-PCS | Mod: 26,,, | Performed by: RADIOLOGY

## 2020-01-21 PROCEDURE — 76770 US EXAM ABDO BACK WALL COMP: CPT | Mod: TC,PO

## 2020-01-21 PROCEDURE — 74018 RADEX ABDOMEN 1 VIEW: CPT | Mod: TC,FY,PO

## 2020-01-21 PROCEDURE — 76770 US RETROPERITONEAL COMPLETE: ICD-10-PCS | Mod: 26,,, | Performed by: RADIOLOGY

## 2020-01-21 PROCEDURE — 74018 RADEX ABDOMEN 1 VIEW: CPT | Mod: 26,,, | Performed by: RADIOLOGY

## 2020-01-27 NOTE — PROGRESS NOTES
[facility] Note  Staff: JEFF Crane-C    PCP: Dr. Jan Petersen    Chief Complaint: Follow-up    Subjective:        HPI: Nancy Muñoz is a 64 y.o. female presents today for routine recheck.  Pt was last seen by Dr. Niharika Collier on 12/18/2019 for RIGHT ESWL procedure.  Findings from last procedure:   The stones are in the RLP 1cm x 2. More easily seen on flouro table than the kub done earlier today.   By the end of the procedure the stones adjacent to each other appeared fragmented    TODAY-Pt doing well with no complaints voiced.  She has not passed any stone fragments since last procedure with MD.  No gross hematuria or dysuria    Recent Imaging Results:  KUB on 01/21/2020:  There are stable postsurgical changes in the lower lumbar spine with   posterior instrumented an anterior interbody fusion of L5 and S1.  There   is a marked amount of stool throughout the colon.  There are surgical   clips in the right upper quadrant from prior cholecystectomy.  As seen on   the prior plain films, there are small calcifications in the lower pole of   the right kidney.  These appear fragment on plain film.  CT dated   11/08/2019 demonstrated a 7 mm calcification in the lower pole.  There may   also be very faint calcification in the lower pole of the left kidney.  Impression:  As above.    RBUS done on 01/21/2020:  IMPRESSION:  Nonobstructing right renal calculus    *24 Hour Urine Results From [facility] (Pt results were already sent to Endocrine)  Date: 12/18/19  Urine volume (0.5-4L): 1.23  SS CaOx (6-10): 16.05H  Urine Calcium (mg/day) (male <250, female <200): 357H  Urine Oxalate (mg/day) (20-40): 41H  Urine Citrate (mg/day)(male>450, female>550): 700  SS CaP (0.5-2): 3.64H  24 Hour Urine pH (5.8-6.2):6.366  SS Uric Acid (0-1): 0.41  Urine Uric Acid (g/day) (male<0.800, female<0.750): 0.527  Creatinine: 1487     REVIEW OF SYSTEMS:  A comprehensive 10 system review was performed and  is negative except as noted above in HPI    PMHx:  Past Medical History:   Diagnosis Date    Allergy     Arthritis     spine-ddd    Asthma     usu allergy related    Back pain     Coronary artery disease 08/2019    cabg scheduled    Diverticulosis     Encounter for blood transfusion     High calcium levels 2015    Hypercholesteremia 2017    Hypertension     dr plunkett & dr pham    Hyperthyroidism     ???    Indigestion 08/2019    slight problem yrs ago    Kidney stone 10/2/2019    Lumbar radiculitis 1/30/2018    MI (myocardial infarction) 08/22/2019    angio done- cabg scheduled    NSTEMI (non-ST elevated myocardial infarction) 8/25/2019    Pain management 08/2019    dr mays ([place])    Renal stone 08/2019    to f/u with urology post op    Renal stone 2015    stone removed    Staph infection 2005    treated     PSHx:  Past Surgical History:   Procedure Laterality Date    ANGIOGRAM, CORONARY, WITH LEFT HEART CATHETERIZATION  8/27/2019    Procedure: Angiogram, Coronary, with Left Heart Cath;  Surgeon: Sean Pham MD;  Location: [facility] CATH/EP LAB;  Service: Cardiology;;    APPENDECTOMY      BACK SURGERY      CERVICAL, LUMBAR    CARDIAC SURGERY      CABG X 3  9-5-19    CERVICAL SPINE SURGERY  2006    Cervical fusions, titanium cages    CHOLECYSTECTOMY  02/28/2018    COLONOSCOPY      x 2    CORONARY ANGIOGRAPHY Left 8/27/2019    Procedure: ANGIOGRAM, CORONARY ARTERY;  Surgeon: Sean Pham MD;  Location: [facility] CATH/EP LAB;  Service: Cardiology;  Laterality: Left;    CORONARY ARTERY BYPASS GRAFT (CABG) N/A 9/5/2019    Procedure: CABG w/ EVH;  Surgeon: Freddy Julien MD;  Location: [facility] OR;  Service: Cardiothoracic;  Laterality: N/A;    CYSTOSCOPY WITH CALCULUS EXTRACTION Right 10/2/2019    Procedure: CYSTOSCOPY, WITH CALCULUS REMOVAL;  Surgeon: Niharika Collier MD;  Location: [facility] OR;  Service: Urology;  Laterality: Right;    CYSTOSCOPY WITH CALCULUS  EXTRACTION Right 10/16/2019    Procedure: CYSTOSCOPY, WITH CALCULUS REMOVAL;  Surgeon: Niharika Collier MD;  Location: [facility] OR;  Service: Urology;  Laterality: Right;    CYSTOURETEROSCOPY WITH RETROGRADE PYELOGRAPHY AND INSERTION OF STENT INTO URETER Right 10/2/2019    Procedure: CYSTOURETEROSCOPY, WITH RETROGRADE PYELOGRAM AND URETERAL STENT INSERTION;  Surgeon: Niharika Collier MD;  Location: [facility] OR;  Service: Urology;  Laterality: Right;    CYSTOURETEROSCOPY WITH RETROGRADE PYELOGRAPHY AND INSERTION OF STENT INTO URETER Right 10/16/2019    Procedure: CYSTOURETEROSCOPY, WITH RETROGRADE PYELOGRAM AND URETERAL STENT INSERTION;  Surgeon: Niharika Collier MD;  Location: [facility] OR;  Service: Urology;  Laterality: Right;    ENDOSCOPIC HARVEST OF VEIN N/A 9/5/2019    Procedure: HARVEST-VEIN-ENDOVASCULAR;  Surgeon: Freddy Julien MD;  Location[facility] OR;  Service: Cardiothoracic;  Laterality: N/A;    EXTRACORPOREAL SHOCK WAVE LITHOTRIPSY Right 12/18/2019    Procedure: LITHOTRIPSY, ESWL;  Surgeon: Niharika Collier MD;  Location: [facility] OR;  Service: Urology;  Laterality: Right;    LASER LITHOTRIPSY Right 10/2/2019    Procedure: LITHOTRIPSY, USING LASER;  Surgeon: Niharika Collier MD;  Location: [facility] OR;  Service: Urology;  Laterality: Right;    LASER LITHOTRIPSY Right 10/16/2019    Procedure: LITHOTRIPSY, USING LASER;  Surgeon: Niharika Collier MD;  Location: [facility] OR;  Service: Urology;  Laterality: Right;    SPINE SURGERY      back    TONSILLECTOMY      URETEROSCOPY Right 10/2/2019    Procedure: URETEROSCOPY;  Surgeon: Niharika Collier MD;  Location: [facility] OR;  Service: Urology;  Laterality: Right;    URETEROSCOPY  10/16/2019    Procedure: URETEROSCOPY;  Surgeon: Niharika Collier MD;  Location: [facility] OR;  Service: Urology;;     Allergies:  Pineapple     Medications: reviewed   Objective:     Vitals:    01/28/20 1035   BP: 113/71   Pulse: 69   Temp:  98 °F (36.7 °C)     Physical Exam   Vitals reviewed.  Constitutional: She is oriented to person, place, and time. She appears well-developed and well-nourished.   HENT:   Head: Normocephalic and atraumatic.   Eyes: Conjunctivae and EOM are normal. Pupils are equal, round, and reactive to light.   Neck: Normal range of motion. Neck supple.   Cardiovascular: Normal rate, regular rhythm, normal heart sounds and intact distal pulses.    Pulmonary/Chest: Effort normal and breath sounds normal.   Abdominal: Soft. Bowel sounds are normal.   Musculoskeletal: Normal range of motion.   Neurological: She is alert and oriented to person, place, and time. She has normal reflexes.   Skin: Skin is warm and dry.     Psychiatric: She has a normal mood and affect. Her behavior is normal. Judgment and thought content normal.     Assessment:       1. Nephrolithiasis    2. Kidney stone    3. Flank pain          Plan:   Kidney stones:    I have discussed pt's 24 hr [facility] results with patient during office visit today with all questions answered.  Information given to pt.    Based on the ultrasound says stone is still present and about the same size (2 stones) but the xray shows that the stone if there is less dense. Per Dr. Collier-She would like to avoid a CT right now but will schedule a CT in 3 months and f/u after with MD.  Pt verbalized understanding.    F/u with Dr. Collier in three months as requested by MD with CT prior.    [facility]: Active    DONELL Machuca

## 2020-01-28 ENCOUNTER — OFFICE VISIT (OUTPATIENT)
Dept: UROLOGY | Facility: CLINIC | Age: 65
End: 2020-01-28
Payer: MEDICARE

## 2020-01-28 ENCOUNTER — TELEPHONE (OUTPATIENT)
Dept: UROLOGY | Facility: CLINIC | Age: 65
End: 2020-01-28

## 2020-01-28 VITALS
HEART RATE: 69 BPM | BODY MASS INDEX: 26.27 KG/M2 | DIASTOLIC BLOOD PRESSURE: 71 MMHG | SYSTOLIC BLOOD PRESSURE: 113 MMHG | WEIGHT: 153.88 LBS | TEMPERATURE: 98 F | HEIGHT: 64 IN

## 2020-01-28 DIAGNOSIS — R10.9 FLANK PAIN: ICD-10-CM

## 2020-01-28 DIAGNOSIS — N20.0 NEPHROLITHIASIS: Primary | ICD-10-CM

## 2020-01-28 DIAGNOSIS — N20.0 KIDNEY STONE: ICD-10-CM

## 2020-01-28 PROCEDURE — 99999 PR PBB SHADOW E&M-EST. PATIENT-LVL III: ICD-10-PCS | Mod: PBBFAC,,, | Performed by: NURSE PRACTITIONER

## 2020-01-28 PROCEDURE — 3074F PR MOST RECENT SYSTOLIC BLOOD PRESSURE < 130 MM HG: ICD-10-PCS | Mod: CPTII,S$GLB,, | Performed by: NURSE PRACTITIONER

## 2020-01-28 PROCEDURE — 99024 PR POST-OP FOLLOW-UP VISIT: ICD-10-PCS | Mod: S$GLB,,, | Performed by: NURSE PRACTITIONER

## 2020-01-28 PROCEDURE — 3008F BODY MASS INDEX DOCD: CPT | Mod: CPTII,S$GLB,, | Performed by: NURSE PRACTITIONER

## 2020-01-28 PROCEDURE — 3078F DIAST BP <80 MM HG: CPT | Mod: CPTII,S$GLB,, | Performed by: NURSE PRACTITIONER

## 2020-01-28 PROCEDURE — 1101F PR PT FALLS ASSESS DOC 0-1 FALLS W/OUT INJ PAST YR: ICD-10-PCS | Mod: CPTII,S$GLB,, | Performed by: NURSE PRACTITIONER

## 2020-01-28 PROCEDURE — 3078F PR MOST RECENT DIASTOLIC BLOOD PRESSURE < 80 MM HG: ICD-10-PCS | Mod: CPTII,S$GLB,, | Performed by: NURSE PRACTITIONER

## 2020-01-28 PROCEDURE — 1101F PT FALLS ASSESS-DOCD LE1/YR: CPT | Mod: CPTII,S$GLB,, | Performed by: NURSE PRACTITIONER

## 2020-01-28 PROCEDURE — 3008F PR BODY MASS INDEX (BMI) DOCUMENTED: ICD-10-PCS | Mod: CPTII,S$GLB,, | Performed by: NURSE PRACTITIONER

## 2020-01-28 PROCEDURE — 99024 POSTOP FOLLOW-UP VISIT: CPT | Mod: S$GLB,,, | Performed by: NURSE PRACTITIONER

## 2020-01-28 PROCEDURE — 99999 PR PBB SHADOW E&M-EST. PATIENT-LVL III: CPT | Mod: PBBFAC,,, | Performed by: NURSE PRACTITIONER

## 2020-01-28 PROCEDURE — 3074F SYST BP LT 130 MM HG: CPT | Mod: CPTII,S$GLB,, | Performed by: NURSE PRACTITIONER

## 2020-01-28 NOTE — TELEPHONE ENCOUNTER
----- Message from Princess SANDHYA Bowen sent at 1/28/2020  9:18 AM CST -----  Contact: Patient  Type: Needs Medical Advice    Who Called:  Patient  Best Call Back Number:   Additional Information: Requesting a call from the office in regards to her appt on tomorrow patient states she has some questions

## 2020-01-28 NOTE — TELEPHONE ENCOUNTER
Patient calling to see what her appt today will cover.  Advised that NP will be reviewing imaging and 24 hour urine results and recommendations.

## 2020-02-05 ENCOUNTER — TELEPHONE (OUTPATIENT)
Dept: ENDOCRINOLOGY | Facility: CLINIC | Age: 65
End: 2020-02-05

## 2020-02-05 NOTE — TELEPHONE ENCOUNTER
At last visit, wanted neck imaging and DXA for hyperpara evaluation. Currently scheduled for April. In light of urine results suggestive of primary hyperparathyroidism, and concerns for persistent large stones on recent imaging, would like to get imaging done sooner so f/u sestamibi, surgery referral, etc can be done as needed.

## 2020-02-14 ENCOUNTER — HOSPITAL ENCOUNTER (OUTPATIENT)
Dept: RADIOLOGY | Facility: CLINIC | Age: 65
Discharge: HOME | End: 2020-02-14
Attending: INTERNAL MEDICINE
Payer: MEDICARE

## 2020-02-14 DIAGNOSIS — E21.3 HYPERPARATHYROIDISM: ICD-10-CM

## 2020-02-14 PROCEDURE — 76536 US EXAM OF HEAD AND NECK: CPT | Mod: TC,PO

## 2020-02-14 PROCEDURE — 76536 US EXAM OF HEAD AND NECK: CPT | Mod: 26,,, | Performed by: RADIOLOGY

## 2020-02-14 PROCEDURE — 76536 US SOFT TISSUE HEAD NECK THYROID: ICD-10-PCS | Mod: 26,,, | Performed by: RADIOLOGY

## 2020-02-19 ENCOUNTER — PATIENT MESSAGE (OUTPATIENT)
Dept: ENDOCRINOLOGY | Facility: CLINIC | Age: 65
End: 2020-02-19

## 2020-02-19 DIAGNOSIS — E21.0 PRIMARY HYPERPARATHYROIDISM: Primary | ICD-10-CM

## 2020-02-19 NOTE — TELEPHONE ENCOUNTER
US with potentially 2 parathyroid adenomas, but some appear intrathyroidal. Ordering NM scan to further assess, and will refer to endocrine surgery.

## 2020-02-28 DIAGNOSIS — M47.22 CERVICAL SPONDYLOSIS WITH RADICULOPATHY: Primary | ICD-10-CM

## 2020-03-04 ENCOUNTER — HOSPITAL ENCOUNTER (OUTPATIENT)
Dept: RADIOLOGY | Facility: HOSPITAL | Age: 65
Discharge: HOME | End: 2020-03-04
Attending: PAIN MEDICINE
Payer: MEDICARE

## 2020-03-04 DIAGNOSIS — M47.22 CERVICAL SPONDYLOSIS WITH RADICULOPATHY: ICD-10-CM

## 2020-03-04 PROCEDURE — 72141 MRI NECK SPINE W/O DYE: CPT | Mod: TC,PO

## 2020-03-04 PROCEDURE — 72040 X-RAY EXAM NECK SPINE 2-3 VW: CPT | Mod: TC,PO

## 2020-03-10 ENCOUNTER — HOSPITAL ENCOUNTER (OUTPATIENT)
Dept: RADIOLOGY | Facility: HOSPITAL | Age: 65
Discharge: HOME | End: 2020-03-10
Attending: INTERNAL MEDICINE
Payer: MEDICARE

## 2020-03-10 DIAGNOSIS — E21.0 PRIMARY HYPERPARATHYROIDISM: ICD-10-CM

## 2020-03-10 PROCEDURE — 78071 PARATHYRD PLANAR W/WO SUBTRJ: CPT | Mod: 26,,, | Performed by: RADIOLOGY

## 2020-03-10 PROCEDURE — 78071 NM PARATHYROID SCAN WITH SPECT ROUTINE: ICD-10-PCS | Mod: 26,,, | Performed by: RADIOLOGY

## 2020-03-10 PROCEDURE — A9500 TC99M SESTAMIBI: HCPCS

## 2020-03-11 ENCOUNTER — PATIENT MESSAGE (OUTPATIENT)
Dept: ENDOCRINOLOGY | Facility: CLINIC | Age: 65
End: 2020-03-11

## 2020-03-11 NOTE — TELEPHONE ENCOUNTER
NM scan with increased uptake right inferior, which could go along with the [id number] nodule see on US.   - Though the [id number] hypoechoic nodule at left thyroid didn't appear hyperfunctioning on NM scan    Had referral already ordered to endocrine surgery. Just needs to schedule.

## 2020-03-12 ENCOUNTER — TELEPHONE (OUTPATIENT)
Dept: SURGERY | Facility: CLINIC | Age: 65
End: 2020-03-12

## 2020-03-12 NOTE — TELEPHONE ENCOUNTER
----- Message from Cassy Figueroa sent at 3/12/2020  9:33 AM CDT -----  Contact: [phone]  Pt called in to speak with the nurse to schedule surgery. Please call back to schedule.

## 2020-03-12 NOTE — TELEPHONE ENCOUNTER
Left message on patient's voicemail that call was returned.  Direct phone number given for her to call back.  Spoke with her earlier this morning and she did not want to schedule with an Endocrine Surgeon on the [facility].  She was going to contact the referring provider to get an appt on the [facility].

## 2020-03-19 ENCOUNTER — OFFICE VISIT (OUTPATIENT)
Dept: SURGERY | Facility: CLINIC | Age: 65
End: 2020-03-19
Payer: MEDICARE

## 2020-03-19 DIAGNOSIS — E21.0 PRIMARY HYPERPARATHYROIDISM: Primary | ICD-10-CM

## 2020-03-19 PROCEDURE — 99213 OFFICE O/P EST LOW 20 MIN: CPT | Mod: 95,,, | Performed by: SURGERY

## 2020-03-19 PROCEDURE — 99213 PR OFFICE/OUTPT VISIT, EST, LEVL III, 20-29 MIN: ICD-10-PCS | Mod: 95,,, | Performed by: SURGERY

## 2020-03-19 PROCEDURE — 1101F PT FALLS ASSESS-DOCD LE1/YR: CPT | Mod: CPTII,95,, | Performed by: SURGERY

## 2020-03-19 PROCEDURE — 1101F PR PT FALLS ASSESS DOC 0-1 FALLS W/OUT INJ PAST YR: ICD-10-PCS | Mod: CPTII,95,, | Performed by: SURGERY

## 2020-03-19 NOTE — PATIENT INSTRUCTIONS
" The patient was directed to the [employer] patient education portal "www.endocrinediseases.org" as a resource.  "

## 2020-03-19 NOTE — LETTER
Endocrine/General Surgery  [address] [place] [address]  Phone: [phone]  Fax: [phone] April 10, 2020         James Dumas MD  [address] [facility] [address]    Patient: Nancy Muñoz   YOB: 1955   Date of Visit: 3/19/2020       Dear Dr. Dumas:    I saw Ms. Muñoz in clinic via telemedicine. Attached you will find a copy of my note.    As you know, she is a 64 y.o. female who presented with primary hyperparathyroidism. I was able to discuss the expected perioperative course should parathyroid surgery be chosen. The current plan includes parathyroid surgery later in the year.    If you have any questions or concerns, please don't hesitate to contact my office. Again, thank you kindly for this referral.    With many thanks,        Neva Mahajan MD    CC  Jan Petersen MD  [address] [facility]  [place] [address]  VIA Facsimile: [phone]
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
## 2020-03-19 NOTE — LETTER
March 23, 2020      James Dumas MD  [address] [facility] [address]           [facility] - Endo Surgery 2nd [place]  [address]  [facility] [id number]  Phone: [phone]          Patient: Nancy Muñoz   MR Number: [id number]   YOB: 1955   Date of Visit: 3/19/2020       Dear Dr. James Dumas:    Thank you for referring Nancy Muñoz to me for evaluation. Attached you will find relevant portions of my assessment and plan of care.    If you have questions, please do not hesitate to call me. I look forward to following Nancy Muñoz along with you.    Sincerely,    Neva Mahajan MD    Enclosure  CC:  No Recipients    If you would like to receive this communication electronically, please contact [email or web address] or [phone] to request more information on [employer] Link access.    For providers and/or their staff who would like to refer a patient to [facility], please contact us through our one-stop-shop provider referral line, [facility], at [phone].    If you feel you have received this communication in error or would no longer like to receive these types of communications, please e-mail [email or web address]

## 2020-04-30 DIAGNOSIS — M47.22 OTHER SPONDYLOSIS WITH RADICULOPATHY, CERVICAL REGION: Primary | ICD-10-CM

## 2020-04-30 DIAGNOSIS — G89.4 CHRONIC PAIN SYNDROME: ICD-10-CM

## 2020-04-30 DIAGNOSIS — M96.1 POSTLAMINECTOMY SYNDROME, NOT ELSEWHERE CLASSIFIED: ICD-10-CM

## 2020-05-04 ENCOUNTER — HOSPITAL ENCOUNTER (OUTPATIENT)
Dept: RADIOLOGY | Facility: HOSPITAL | Age: 65
Discharge: HOME | End: 2020-05-04
Attending: INTERNAL MEDICINE
Payer: MEDICARE

## 2020-05-04 ENCOUNTER — HOSPITAL ENCOUNTER (OUTPATIENT)
Dept: RADIOLOGY | Facility: HOSPITAL | Age: 65
Discharge: HOME | End: 2020-05-04
Attending: NURSE PRACTITIONER
Payer: MEDICARE

## 2020-05-04 DIAGNOSIS — E21.3 HYPERPARATHYROIDISM: ICD-10-CM

## 2020-05-04 DIAGNOSIS — R10.9 FLANK PAIN: ICD-10-CM

## 2020-05-04 PROCEDURE — 77080 DXA BONE DENSITY AXIAL: CPT | Mod: TC

## 2020-05-04 PROCEDURE — 77080 DEXA BONE DENSITY SPINE HIP: ICD-10-PCS | Mod: 26,,, | Performed by: RADIOLOGY

## 2020-05-04 PROCEDURE — 74176 CT ABD & PELVIS W/O CONTRAST: CPT | Mod: 26,,, | Performed by: RADIOLOGY

## 2020-05-04 PROCEDURE — 77080 DXA BONE DENSITY AXIAL: CPT | Mod: 26,,, | Performed by: RADIOLOGY

## 2020-05-04 PROCEDURE — 77081 DEXA BONE DENSITY APPENDICULAR SKELETON: ICD-10-PCS | Mod: 26,XS,, | Performed by: RADIOLOGY

## 2020-05-04 PROCEDURE — 77081 DXA BONE DENSITY APPENDICULR: CPT | Mod: 26,XS,, | Performed by: RADIOLOGY

## 2020-05-04 PROCEDURE — 74176 CT ABD & PELVIS W/O CONTRAST: CPT | Mod: TC

## 2020-05-04 PROCEDURE — 77081 DXA BONE DENSITY APPENDICULR: CPT | Mod: TC

## 2020-05-04 PROCEDURE — 74176 CT RENAL STONE STUDY ABD PELVIS WO: ICD-10-PCS | Mod: 26,,, | Performed by: RADIOLOGY

## 2020-05-07 ENCOUNTER — OFFICE VISIT (OUTPATIENT)
Dept: UROLOGY | Facility: CLINIC | Age: 65
End: 2020-05-07
Payer: MEDICARE

## 2020-05-07 DIAGNOSIS — E21.3 HYPERPARATHYROIDISM: ICD-10-CM

## 2020-05-07 DIAGNOSIS — N20.0 KIDNEY STONE: Primary | ICD-10-CM

## 2020-05-07 DIAGNOSIS — N20.0 NEPHROLITHIASIS: ICD-10-CM

## 2020-05-07 PROCEDURE — 1101F PR PT FALLS ASSESS DOC 0-1 FALLS W/OUT INJ PAST YR: ICD-10-PCS | Mod: CPTII,95,, | Performed by: UROLOGY

## 2020-05-07 PROCEDURE — 99214 OFFICE O/P EST MOD 30 MIN: CPT | Mod: 95,,, | Performed by: UROLOGY

## 2020-05-07 PROCEDURE — 1101F PT FALLS ASSESS-DOCD LE1/YR: CPT | Mod: CPTII,95,, | Performed by: UROLOGY

## 2020-05-07 PROCEDURE — 99214 PR OFFICE/OUTPT VISIT, EST, LEVL IV, 30-39 MIN: ICD-10-PCS | Mod: 95,,, | Performed by: UROLOGY

## 2020-05-07 NOTE — PROGRESS NOTES
Reason for telemed visit: This is a telemedicine visit performed during the COVID 19 isolation  This patient was evaluated during the COVID-19 pandemic. Work up/disposition may be affected by resource limitations and concerns regarding nosocomial infections.     The patient location is: home  Date of Service: 05/07/2020   The chief complaint leading to consultation is: see hpi and visit diagnosis  Visit type: Virtual visit with Real Time synchronous AUDIO and VIDEO     Each patient to whom he or she provides medical services by telemedicine is:    (1) informed of the relationship between the physician and patient and the respective role of any other health care provider with respect to management of the patient; and   (2) notified that he or she may decline to receive medical services by telemedicine and may withdraw from such care at any time.      [facility] Note - [place]  Staff: MD Nando  PCP: Jan Petersen MD    MyChart Utilization: active    Chief Complaint:   No chief complaint on file.        Subjective:        HPI: Nancy Muñoz is a 64 y.o. female     -h/o ureteral R requiring extraction around 2011. None since. No family hx of stones.   -around 8/2019 developed Gh w/o UTI sx.   Found to have proteus UTI, ct showed  Large 2.5cm R lower pole stone and 3mm LLP stone.  Denies any flank pain. Cytology 8/28/19 was neg. (+smoker but quit)  -underwent R URS of stone extraction on 10/2/19 but unable to reah all of stone, some of it was in a pocket and medial  -underwent repeat R urs and stone extraction on 10/16/19. Still had very to difficult to access lower pole. Removed a lot of stone but there was another stone which was in a medial calyx I could not reach.   -f/u ctrss 11/8/19 done to eval residual stone burden which was much less but still had 2 RLP stones (9mm inferiorly and medial 7mm stone blocking mouth    Interval history:   Decided to try R ESWL on the 7mm medial  stone blocking mouth as the other one was stuck in a pocket and difficult to reach and unlikely to pass. Underwent eswl 12/2019 and she states she did not pass any stone fragments. She as since found to have elevated pth, saw , underwent a parathyroid scan which showed hyperactive parathyroid. She saw  and was scheduled for parathyroidectomy but postponed due to covid pandemic.    A repeat ctrss on 5/4 showed she only had the 9mm RLP stone remaining indicating the ESWL treated the 7mm medial RLP stone. She also has a 3mm LLP stone, but this has remained unchanged.     She's had no pain and passed no stone since her last procedure  She's had no blood In urine    ua void: No urine sample provided due to telemedicine visit    Urine history:   12/2019 No cx, void:tr leuk/tr bld-  No sx of uti  10/9/19            Ng, cath : 3+leuk/3+bld, >100 rbc/30 wbc  10/2/19            No cx, cath: 2+bld/3+leuk  9/26/19            P.mirablis, pvr by in and out cath: 100cc cloudy  9/4/19              No cx, void: 1+leuk, 19 wbc/8 rbc  8/28/19            P.mirabilis, void: 2+bld/2+leuk, 15 wbc, >100 rbc, cath: 3+ bld/3+leuk  2/23/18            Ng, void: 1+leuk, 38 wbc/2 rbc      REVIEW OF SYSTEMS:  General ROS: no fevers, no chills  Psychological ROS: no depression  Endocrine ROS: no heat or cold  Respiratory ROS:+ SOB since MI  Cardiovascular ROS: no CP  Gastrointestinal ROS: no abdominal pain, no constipation, no diarrhea, noBRBPR  Musculoskeletal ROS: no muscle pain  Neurological ROS: no headaches  Dermatological ROS: no rashes  HEENT: noglasses, no sinus   ROS: per HPI     PMHx:  Past Medical History:   Diagnosis Date    Allergy     Arthritis     spine-ddd    Asthma     usu allergy related    Back pain     Coronary artery disease 08/2019    cabg scheduled    Diverticulosis     Encounter for blood transfusion     High calcium levels 2015    Hypercholesteremia 2017    Hypertension     dr plunkett & dr england     Hyperthyroidism     ???    Indigestion 08/2019    slight problem yrs ago    Kidney stone 10/2/2019    Lumbar radiculitis 1/30/2018    MI (myocardial infarction) 08/22/2019    angio done- cabg scheduled    NSTEMI (non-ST elevated myocardial infarction) 8/25/2019    Pain management 08/2019    dr mays ([place])    Renal stone 08/2019    to f/u with urology post op    Renal stone 2015    stone removed    Staph infection 2005    treated       PSHx:  Past Surgical History:   Procedure Laterality Date    ANGIOGRAM, CORONARY, WITH LEFT HEART CATHETERIZATION  8/27/2019    Procedure: Angiogram, Coronary, with Left Heart Cath;  Surgeon: Sean Pham MD;  Location: [facility] CATH/EP LAB;  Service: Cardiology;;    APPENDECTOMY      BACK SURGERY      CERVICAL, LUMBAR    CARDIAC SURGERY      CABG X 3  9-5-19    CERVICAL SPINE SURGERY  2006    Cervical fusions, titanium cages    CHOLECYSTECTOMY  02/28/2018    COLONOSCOPY      x 2    CORONARY ANGIOGRAPHY Left 8/27/2019    Procedure: ANGIOGRAM, CORONARY ARTERY;  Surgeon: Sean Pham MD;  Location: [facility] CATH/EP LAB;  Service: Cardiology;  Laterality: Left;    CORONARY ARTERY BYPASS GRAFT (CABG) N/A 9/5/2019    Procedure: CABG w/ EVH;  Surgeon: Freddy Julien MD;  Location: [facility] OR;  Service: Cardiothoracic;  Laterality: N/A;    CYSTOSCOPY WITH CALCULUS EXTRACTION Right 10/2/2019    Procedure: CYSTOSCOPY, WITH CALCULUS REMOVAL;  Surgeon: Niharika Collier MD;  Location: [facility] OR;  Service: Urology;  Laterality: Right;    CYSTOSCOPY WITH CALCULUS EXTRACTION Right 10/16/2019    Procedure: CYSTOSCOPY, WITH CALCULUS REMOVAL;  Surgeon: Niharika Collier MD;  Location: [facility] OR;  Service: Urology;  Laterality: Right;    CYSTOURETEROSCOPY WITH RETROGRADE PYELOGRAPHY AND INSERTION OF STENT INTO URETER Right 10/2/2019    Procedure: CYSTOURETEROSCOPY, WITH RETROGRADE PYELOGRAM AND URETERAL STENT INSERTION;  Surgeon: Niharika ZHANG  MD Nando;  Location: [facility] OR;  Service: Urology;  Laterality: Right;    CYSTOURETEROSCOPY WITH RETROGRADE PYELOGRAPHY AND INSERTION OF STENT INTO URETER Right 10/16/2019    Procedure: CYSTOURETEROSCOPY, WITH RETROGRADE PYELOGRAM AND URETERAL STENT INSERTION;  Surgeon: Niharika Collier MD;  Location: [facility] OR;  Service: Urology;  Laterality: Right;    ENDOSCOPIC HARVEST OF VEIN N/A 9/5/2019    Procedure: HARVEST-VEIN-ENDOVASCULAR;  Surgeon: Freddy Julien MD;  Location: [facility] OR;  Service: Cardiothoracic;  Laterality: N/A;    EXTRACORPOREAL SHOCK WAVE LITHOTRIPSY Right 12/18/2019    Procedure: LITHOTRIPSY, ESWL;  Surgeon: Niharika Collier MD;  Location: [facility] OR;  Service: Urology;  Laterality: Right;    LASER LITHOTRIPSY Right 10/2/2019    Procedure: LITHOTRIPSY, USING LASER;  Surgeon: Niharika Collier MD;  Location: [facility] OR;  Service: Urology;  Laterality: Right;    LASER LITHOTRIPSY Right 10/16/2019    Procedure: LITHOTRIPSY, USING LASER;  Surgeon: Niharika Collier MD;  Location: [facility] OR;  Service: Urology;  Laterality: Right;    SPINE SURGERY      back    TONSILLECTOMY      URETEROSCOPY Right 10/2/2019    Procedure: URETEROSCOPY;  Surgeon: Niharika Collier MD;  Location: [facility] OR;  Service: Urology;  Laterality: Right;    URETEROSCOPY  10/16/2019    Procedure: URETEROSCOPY;  Surgeon: Niharika Collier MD;  Location: [facility] OR;  Service: Urology;;       Stents/Valves/Foreign Bodies/Cardiac Evaluation/Cardiologist: /  GI: , last colonoscopy:18 polyps, 2017 - was told to come back yearly but hasn't been able to go back    Family History   Problem Relation Age of Onset    Nephrolithiasis Mother     Nephrolithiasis Brother     Calcium disorder Neg Hx       malignancies: father with prostate cancer a 70s  kidney stones: none    Soc Hx:  Social History     Tobacco Use    Smoking status: Former Smoker     Packs/day: 0.50      Years: 15.00     Pack years: 7.50     Types: Cigarettes     Last attempt to quit: 2019     Years since quittin.6    Smokeless tobacco: Never Used   Substance Use Topics    Alcohol use: Yes     Alcohol/week: 1.0 standard drinks     Types: 1 Glasses of wine per week     Frequency: Monthly or less     Comment: rarely    Drug use: No     1 ppd x 20 years, quit after CABG    Lives in: [place]   :  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    No vitals or exam beyond what is listed below performed due to virtual visit (COVID)  Neuro: awake, alert and oriented  Musculoskeletal: normal range of motion          LABS REVIEW:  Recent Labs   Lab 19  0417 19  1505 19  1113   WBC 7.97 6.15 6.71   Hemoglobin 8.9 L 11.9 L 15.5   Hematocrit 26.6 L 36.9 L 45.2   Platelets 105 L 323 204   ]  Recent Labs   Lab 19  0343 19  0325  19  0429 19  0442 19  0345 19  0417 19  1505 19  1113 19  1018   Sodium 143  143 138  138   < > 143 143 146 H 143 145 142 143   Potassium 3.7  3.7 3.5  3.5   < > 3.4 L 4.9 4.4 3.7 4.6 3.9 3.6   Chloride 110  110 105  105   < > 113 H 112 H 113 H 108 104 101 103   CO2 26  26 25  25   < > 21 L 27 29 29 31 H 33 H 31 H   BUN, Bld 33 H  33 H 37 H  37 H   < > 25 H 36 H 39 H 32 H 22 26 H 33 H   Creatinine 0.7  0.7 0.9  0.9   < > 0.7 0.7 0.7 0.7 0.9 1.1 0.9   Glucose 105  105 108  108   < > 173 H 132 H 110 103 110 91 91   Calcium 10.6 H  10.6 H 10.6 H  10.6 H   < > 8.5 L 9.7 9.3 9.1 11.2 H 12.3 HH 11.3 H   Magnesium 1.8 2.0   < > 2.2 2.1 2.0  --   --   --   --    Phosphorus 2.7 3.6   < > 3.2 1.9 L 1.6 L  --   --   --   --    Alkaline Phosphatase 92 104  --   --   --   --  46 L  --   --   --    Total Protein 6.6 7.5  --   --   --   --  4.9 L  --    --   --    Albumin 4.0 4.4  --   --   --   --  2.7 L  --   --   --    Total Bilirubin 1.9 H 1.5 H  --   --   --   --  1.6 H  --   --   --    AST 21 23  --   --   --   --  20  --   --   --    ALT 23 30  --   --   --   --  21  --   --   --     < > = values in this interval not displayed.   ]    Lab Results   Component Value Date    HGBA1C 5.2 02/23/2018          Recent Pertinent urologic PATHOLOGY REVIEW: See hpi for recent     Stone analysis 10/2/19  There are approximately 2 stones of the right kidney the largest of which is in the lower pole measuring 7 mm.  Previously a significantly larger lower pole stone of the right kidney with staghorn morphology was present.  The left kidney demonstrates a few small stones measuring up to 4 mm within the lower pole, without significant change.  There is no ureterolithiasis or hydronephrosis.    The small bowel is normal caliber.  The appendix is normal.  There is moderate sigmoid diverticulosis.    There is moderate calcification of the aorta without aneurysm.  There has been a prior sternotomy.  There has been right unilateral lon and pedicle screw fusion of L5-S1 as well as anterior L5-S1 instrumented fusion.    Urine cytology [facility] 8/28/19     Negative for malignanct cells  Acute inflammation  satisfactor for eval but with scant urothelial component  bloody      Recent Pertinent Urologic RADIOGRAPHIC REVIEW: See hpi for recent     ctrss 10/16/19  There are approximately 2 stones of the right kidney the largest of which is in the lower pole measuring 7 mm.  Previously a significantly larger lower pole stone of the right kidney with staghorn morphology was present.  The left kidney demonstrates a few small stones measuring up to 4 mm within the lower pole, without significant change.  There is no ureterolithiasis or hydronephrosis.    The small bowel is normal caliber.  The appendix is normal.  There is moderate sigmoid diverticulosis.    There is moderate calcification of  the aorta without aneurysm.  There has been a prior sternotomy.  There has been right unilateral lon and pedicle screw fusion of L5-S1 as well as anterior L5-S1 instrumented fusion.    Ctu 8/28/19  CT abdomen without contrast demonstrates bilateral renal calculi with a large staghorn type calculus filling the lower pole calyx of the right kidney (1.5cm).  Early arterial phase and delayed contrast enhanced phases demonstrates no renal masses.    cta 2/23/18  T abdomen without contrast demonstrates bilateral renal calculi with a large staghorn type calculus filling the lower pole calyx of the left kidney.  Early arterial phase and delayed contrast enhanced phases demonstrates no renal masses.    Assessment:       No diagnosis found.      Plan:     There are no diagnoses linked to this encounter.  · Still has RLP 9mm inferior stone and LLP 3mm stone. Overall stone burden in right kidney decreased by almost 2cm and the stone in the RLP is too difficult to reach retrograde.  · Likely stnes will enlarge until calcium/pth normalize after parathyroidectomy  · Will continue to monitor with imaging  · rbus and kub in 6 months, return sooner if having pain or bloody urine.   · Also let her know she can discontinue the flomax    Fu in 6 months for VV with rbus and kub prior    Route - sid for parathyroidectomy    Niharika Collier MD

## 2020-05-07 NOTE — PATIENT INSTRUCTIONS
· Still has RLP 9mm inferior stone and LLP 3mm stone. Overall stone burden in right kidney decreased by almost 2cm and the stone in the RLP is too difficult to reach retrograde.  · Likely stnes will enlarge until calcium/pth normalize after parathyroidectomy  · Will continue to monitor with imaging  · rbus and kub in 6 months, return sooner if having pain or bloody urine.   · Also let her know she can discontinue the flomax    Fu in 6 months for VV with rbus and kub prior    Route - plan for parathyroidectomy

## 2020-05-11 ENCOUNTER — PATIENT MESSAGE (OUTPATIENT)
Dept: UROLOGY | Facility: CLINIC | Age: 65
End: 2020-05-11

## 2020-05-12 NOTE — TELEPHONE ENCOUNTER
"Please contact pt and advise of the following:    I have contacted the Radiology Department in regards to Hysterectomy statement within her "impression" section at this time.    The radiologist who read her CT is not working today, but staff will speak with Dr. Mcclure who is Radiologist on call today to see if he can read report and make necessary corrections at this time.    "

## 2020-05-14 ENCOUNTER — HOSPITAL ENCOUNTER (OUTPATIENT)
Dept: RADIOLOGY | Facility: HOSPITAL | Age: 65
Discharge: HOME | End: 2020-05-14
Attending: PAIN MEDICINE
Payer: MEDICARE

## 2020-05-14 DIAGNOSIS — M96.1 POSTLAMINECTOMY SYNDROME, NOT ELSEWHERE CLASSIFIED: ICD-10-CM

## 2020-05-14 DIAGNOSIS — M47.22 OTHER SPONDYLOSIS WITH RADICULOPATHY, CERVICAL REGION: ICD-10-CM

## 2020-05-14 DIAGNOSIS — G89.4 CHRONIC PAIN SYNDROME: ICD-10-CM

## 2020-05-14 PROCEDURE — 72040 X-RAY EXAM NECK SPINE 2-3 VW: CPT | Mod: TC,PO

## 2020-05-14 PROCEDURE — 72125 CT NECK SPINE W/O DYE: CPT | Mod: TC,PO

## 2020-05-22 DIAGNOSIS — E21.0 PRIMARY HYPERPARATHYROIDISM: Primary | ICD-10-CM

## 2020-05-22 DIAGNOSIS — E21.0 HYPERPARATHYROIDISM, PRIMARY: Primary | ICD-10-CM

## 2020-05-22 RX ORDER — LIDOCAINE HYDROCHLORIDE 10 MG/ML
1 INJECTION, SOLUTION EPIDURAL; INFILTRATION; INTRACAUDAL; PERINEURAL ONCE
Status: CANCELLED | OUTPATIENT
Start: 2020-05-22 | End: 2020-05-22

## 2020-05-22 RX ORDER — SODIUM CHLORIDE 0.9 % (FLUSH) 0.9 %
10 SYRINGE (ML) INJECTION
Status: CANCELLED | OUTPATIENT
Start: 2020-05-22

## 2020-06-08 ENCOUNTER — TELEPHONE (OUTPATIENT)
Dept: SURGERY | Facility: CLINIC | Age: 65
End: 2020-06-08

## 2020-06-08 NOTE — TELEPHONE ENCOUNTER
Pt called to cancel her Parathyroidectomy scheduled with Dr. Mahajan for 6/16/2020.  She will call back when ready to reschedule.  She understands that she will need to have a Pre-Op appt with Dr. Mahajan as well as an appt with Pre-Admit at [facility] [place] prior to surgery.  Surgery Scheduling notified.

## (undated) DEVICE — TROCAR ENDOPATH XCEL 11MM 10CM

## (undated) DEVICE — PACK PERFUSION

## (undated) DEVICE — SUTURE SILK 2-0 18 A185H

## (undated) DEVICE — GLOVE SURG ULTRA TOUCH 7.5

## (undated) DEVICE — SUTURE MONOCRYL 3-0 27 PS-1 MCP936H

## (undated) DEVICE — CLIP APPLIER MCS20 STERILMED ETHMCS20

## (undated) DEVICE — SUTURE MONOCRYL 0 CT-1 36 Y946H

## (undated) DEVICE — DRESSING POST OP MEPILEX AG 4X6  498300

## (undated) DEVICE — SYRINGE 20CC SLIP TIP 20ML 302831

## (undated) DEVICE — WAX BONE 2.5G (YELLOW)

## (undated) DEVICE — DRAPE IOBAN 23X33 6651EZ

## (undated) DEVICE — SUT MONOCRYL 4-0 PS-2

## (undated) DEVICE — SYR 10CC LUER LOCK

## (undated) DEVICE — SLEEVE SCD EXPRESS CALF MEDIUM

## (undated) DEVICE — SOL WATER STRL IRR 1000ML

## (undated) DEVICE — CANNULA MAMMARY 31001

## (undated) DEVICE — PAD BOVIE ADULT

## (undated) DEVICE — TUBING EASY SPRAY TISSEEL

## (undated) DEVICE — SUTURE VICRYL 1 TP-1 27 J650G

## (undated) DEVICE — TRAY CV ACCESS W/AUX A

## (undated) DEVICE — SYS LABEL CORRECT MED

## (undated) DEVICE — SUTURE SILK 4-0 18 A183H

## (undated) DEVICE — TIP BOVIE 6.5 0014

## (undated) DEVICE — SET IRR URLGY 2LINE UNIV SPIKE

## (undated) DEVICE — COVER LIGHT HANDLE LB53

## (undated) DEVICE — STAPLER SKIN NON ROTATE PXW35

## (undated) DEVICE — SUTURE ETHIBOND 2-0 SH-1 36 X763H

## (undated) DEVICE — SOL IRR NACL .9% 3000ML

## (undated) DEVICE — VALVE BLEEDBACK CONTROL 1003330

## (undated) DEVICE — SEE MEDLINE ITEM 152622

## (undated) DEVICE — Device

## (undated) DEVICE — SOLUTION NACL 0.9% 3000ML

## (undated) DEVICE — APPLICATOR CHLORAPREP CLR 10.5

## (undated) DEVICE — WATER STRL IRR USP UROMTC 1000

## (undated) DEVICE — CATHETER THORACIC 28FR 8028

## (undated) DEVICE — URETEROSCOPE LITHOVUE STANDARD

## (undated) DEVICE — SPONGE SUPER KERLIX 6X6.75IN

## (undated) DEVICE — SEE MEDLINE ITEM 152487

## (undated) DEVICE — BANDAGE ACE STERILE 6 REB3116

## (undated) DEVICE — WIRE GD LUB STD 3CM .035 150CM

## (undated) DEVICE — STONE EXTRAC N-COMPASS NITINOL

## (undated) DEVICE — SHEATH URETERAL FLEXOR 12X28CM

## (undated) DEVICE — CLOSURE SKIN STERI STRIP 1/2X4

## (undated) DEVICE — DRESSING POST OP MEPILEX  AG 4X10

## (undated) DEVICE — TRAY CV ACCESS W AUX B

## (undated) DEVICE — GUIDEWIRE WHOLEY 260CMX0.035IN

## (undated) DEVICE — MARKER SKIN W/ RULER 77734

## (undated) DEVICE — DRESSING POST OP MEPILEX  AG  4X12

## (undated) DEVICE — SYR DISP LL 5CC

## (undated) DEVICE — SYRINGE 0.9% NACL 10MIL PREFIL

## (undated) DEVICE — GUIDEWIRE 190CMX0.014IN WHISPER 1005357HJ

## (undated) DEVICE — SET CARDIOPLEGIA MYOTHERM XP41B

## (undated) DEVICE — SHEATH URTRL ACCESS 14F 35CM

## (undated) DEVICE — DRAPE BILATERAL LEG 84X80

## (undated) DEVICE — CLIP APPLIER MSM20 STERILMED  ETHMSM20

## (undated) DEVICE — TUBING MINIBORE EXTENSION

## (undated) DEVICE — SPONGE XRAY DETECTABLE 4X4

## (undated) DEVICE — SUTURE PROLENE 4-0 SH 36 8521H

## (undated) DEVICE — SET AUTOTRANSFUSION FRESENIUS 9005104

## (undated) DEVICE — DRAPE SLUSH 66X44 ORS-330

## (undated) DEVICE — SUTURE SILK 0 PSL 30 590H

## (undated) DEVICE — NEEDLE SAFETY COMBO 3ML 25G 5/8IN 305781

## (undated) DEVICE — BLADE SURG CARBON STEEL SZ11

## (undated) DEVICE — DRAIN CHEST DRY SUCTION

## (undated) DEVICE — SYR ONLY LUER LOCK 20CC

## (undated) DEVICE — APPLICATOR CHLORAPREP ORN 26ML

## (undated) DEVICE — TUBING SUCTION FRESENIUS 9108484

## (undated) DEVICE — WIRE GUIDE LUBRCTD ANGL 3CM

## (undated) DEVICE — SEE MEDLINE ITEM 157117

## (undated) DEVICE — CATH URTRL OPEN END STR TIP 5F

## (undated) DEVICE — TROCAR ENDOPATH XCEL 5X100MM

## (undated) DEVICE — SUTURE MONOCRYL 2-0 CT-1 MCP945H

## (undated) DEVICE — CATHETER BALLOON EUPHORA 2.00X15MM

## (undated) DEVICE — SEE MEDLINE ITEM 157116

## (undated) DEVICE — HEMOCONCENTRATOR HPH1000TS

## (undated) DEVICE — SYSTEM VEIN HARVESTING VSP550

## (undated) DEVICE — BLADE SCALPEL #11 371111

## (undated) DEVICE — SCISSOR CURVED ENDOPATH 5MM

## (undated) DEVICE — SEE MEDLINE ITEM 157185

## (undated) DEVICE — BAG TISS RETRV MONARCH 10MM

## (undated) DEVICE — TUBING M/M PRESSURE LL 72

## (undated) DEVICE — SUTURE STEEL 6 18 M654G

## (undated) DEVICE — KIT ANTIFOG

## (undated) DEVICE — SHEATH FLEXOR ACCESS 12X35

## (undated) DEVICE — DRAPE IOBAN 23X17 6650EZ

## (undated) DEVICE — CANNULA ENDOPATH XCEL 5X100MM

## (undated) DEVICE — LUBRICANT SURGILUBE 2 OZ

## (undated) DEVICE — NDL SAFETY 25G X 1.5 ECLIPSE

## (undated) DEVICE — PACK CUSTOM ENDO CHOLO SLI

## (undated) DEVICE — TRAY SKIN PREP DRY

## (undated) DEVICE — PUNCH AORTC 3.5MM RCC-35

## (undated) DEVICE — CLIP LIGACLIP LIGATING TITANIUM LG LT400

## (undated) DEVICE — DRAPE SPLIT CV 66350

## (undated) DEVICE — FIBER LASER HOLMIUM 273 MICRON

## (undated) DEVICE — CATHETER GUIDE LAUNCHER JR4 SH 6FX110CM

## (undated) DEVICE — INSERT EVERGRIP86

## (undated) DEVICE — SUTURE ETHIBOND 2-0 SH 36 X523H

## (undated) DEVICE — POUCH INSTRUMENT 1018

## (undated) DEVICE — ELECTRODE REM PLYHSV RETURN 9

## (undated) DEVICE — TUBING INSUFFLATION 10FT

## (undated) DEVICE — GLOVE BIOGEL PI GOLD SZ 6.5

## (undated) DEVICE — NDL HYPODERMIC BLUNT 18G 1.5IN

## (undated) DEVICE — GLOVE BIOGEL PI  GOLD SZ 7

## (undated) DEVICE — SOLUTION PREP IODINE 4OZ

## (undated) DEVICE — BAG DECANTER 10-102

## (undated) DEVICE — CANNULA 14FR RC2014

## (undated) DEVICE — CATHETER RADIAL TIG 4.0 OPTITORQUE 5X110

## (undated) DEVICE — GUIDEWIRE STR TIP HIWIRE 150CM

## (undated) DEVICE — BANDAGE ACE STERILE 4 REB3114

## (undated) DEVICE — EXTRACTOR STONE 4WR NIT 2.2F 1

## (undated) DEVICE — CANNULA SELECT 3D II 20FR 78520

## (undated) DEVICE — SUCTION YANKAUER 34970

## (undated) DEVICE — CATH URETERAL RUTNER 5 FR

## (undated) DEVICE — SCRUB HIBICLENS 4% CHG 4OZ

## (undated) DEVICE — SOLUTION SCRUB IODINE 4OZ

## (undated) DEVICE — GLOVE SURG ULTRA TOUCH 6

## (undated) DEVICE — HEMOSTAT VASC BAND REG 24CM

## (undated) DEVICE — TOWEL OR WHITE

## (undated) DEVICE — APPLIER CLIP ENDO LIGAMAX 5MM

## (undated) DEVICE — SHEATH INTRODUCER SLENDER 6FX10CM

## (undated) DEVICE — SUTURE SILK 2 60 SA8H

## (undated) DEVICE — SUTURE PROLENE 7-0 BV175-6 30

## (undated) DEVICE — MARKER CORONARY BYPASS GRAFT 40149

## (undated) DEVICE — LINER SUCTION 3000CC

## (undated) DEVICE — PREP CHLORA 26ML

## (undated) DEVICE — CATHETER THORACIC 28FR 8128

## (undated) DEVICE — SUTURE PROLENE 6-0 C-1 30 M8706

## (undated) DEVICE — NDL SPINAL SPINOCAN 22GX3.5

## (undated) DEVICE — EXTRACTOR STNE 1.7FRX115CM

## (undated) DEVICE — DRESSING POST OP MEPILEX AG 4X8

## (undated) DEVICE — IMPLANTABLE DEVICE: Type: IMPLANTABLE DEVICE | Site: HEART | Status: NON-FUNCTIONAL

## (undated) DEVICE — BLANKET HYPOTHERMIA LARGE

## (undated) DEVICE — SUTURE ETHBOND 4-0 RB-1 36 X557H

## (undated) DEVICE — VALVE ENDOSCOPIC(SURESEAL II)

## (undated) DEVICE — CONTAINER SPECIMEN 4 OZ STERILE 1053

## (undated) DEVICE — KIT INFLATION MERIT (IN8152, HP9200E)

## (undated) DEVICE — TIP BOVIE TEFLON E1450X

## (undated) DEVICE — GUIDEWIRE AMPLATZ

## (undated) DEVICE — SUTURE STEELPACING 2-0 SH 24 TPW32

## (undated) DEVICE — RESERVOIR FRESENIUS 9108474

## (undated) DEVICE — TUBING PNEUMO

## (undated) DEVICE — SUT 0 VICRYL / UR6 (J603)

## (undated) DEVICE — GLOVE #7.5 BIOGEL 30475

## (undated) DEVICE — CANNULA VENOUS 29-37FR TF2937O

## (undated) DEVICE — NDL INSUF ULTRA VERESS 120MM

## (undated) DEVICE — GUIDEWIRE AMPLATZ .035X145 STR

## (undated) DEVICE — SUTURE SILK 2-0 SH 18 CR C012D